# Patient Record
Sex: MALE | Race: WHITE | NOT HISPANIC OR LATINO | Employment: OTHER | URBAN - METROPOLITAN AREA
[De-identification: names, ages, dates, MRNs, and addresses within clinical notes are randomized per-mention and may not be internally consistent; named-entity substitution may affect disease eponyms.]

---

## 2018-01-12 NOTE — MISCELLANEOUS
Message  GI Reminder Recall [facility]:   Date: 04/28/2016   Dear Claudia Sotelo:     Review of our records shows you are due for the following: Colonoscopy  Please call the following office to schedule your appointment:   [address], [address] [place], [place], [place] [address] [phone]  We look forward to hearing from you! Sincerely,         Signatures   Electronically signed by :  Roberta Rossi, ; Apr 28 2016  2:49PM EST                       (Author)

## 2019-10-09 RX ORDER — AMLODIPINE BESYLATE 10 MG/1
TABLET ORAL
COMMUNITY
Start: 2014-03-28 | End: 2020-05-04 | Stop reason: SDUPTHER

## 2019-10-09 RX ORDER — CYCLOBENZAPRINE HCL 10 MG
TABLET ORAL
COMMUNITY
Start: 2014-08-06 | End: 2022-01-18

## 2019-10-09 RX ORDER — GABAPENTIN 100 MG/1
CAPSULE ORAL
COMMUNITY
Start: 2014-09-12 | End: 2022-01-18

## 2019-10-09 RX ORDER — FENTANYL 100 UG/H
PATCH TRANSDERMAL
COMMUNITY
Start: 2014-12-15 | End: 2022-01-18

## 2019-10-09 RX ORDER — ONDANSETRON 4 MG/1
TABLET, FILM COATED ORAL
COMMUNITY
Start: 2014-09-12 | End: 2022-01-18

## 2019-10-09 RX ORDER — PREGABALIN 150 MG/1
CAPSULE ORAL
COMMUNITY
Start: 2014-09-29 | End: 2022-01-18

## 2019-10-09 RX ORDER — MELOXICAM 7.5 MG/1
1 TABLET ORAL DAILY
COMMUNITY
Start: 2014-10-24 | End: 2022-01-18

## 2019-10-09 RX ORDER — NEOMYCIN SULFATE, POLYMYXIN B SULFATE AND HYDROCORTISONE 10; 3.5; 1 MG/ML; MG/ML; [USP'U]/ML
SUSPENSION/ DROPS AURICULAR (OTIC)
COMMUNITY
Start: 2014-08-22 | End: 2022-01-18

## 2019-10-09 RX ORDER — ALPRAZOLAM 0.5 MG/1
TABLET ORAL
COMMUNITY
Start: 2014-12-15 | End: 2022-01-18

## 2019-10-09 RX ORDER — GLIMEPIRIDE 4 MG/1
4 TABLET ORAL
COMMUNITY
Start: 2010-11-09 | End: 2022-01-18

## 2019-10-09 RX ORDER — FENOFIBRATE 134 MG/1
CAPSULE ORAL
COMMUNITY
Start: 2014-05-12 | End: 2022-01-18

## 2019-10-09 RX ORDER — OXYCODONE HYDROCHLORIDE AND ACETAMINOPHEN 5; 325 MG/1; MG/1
TABLET ORAL
COMMUNITY
Start: 2014-12-17 | End: 2022-01-18

## 2019-10-09 RX ORDER — GABAPENTIN 300 MG/1
CAPSULE ORAL
COMMUNITY
Start: 2014-09-12 | End: 2022-01-18

## 2019-10-09 RX ORDER — FENOFIBRATE 145 MG/1
145 TABLET, COATED ORAL
COMMUNITY
Start: 2010-11-09 | End: 2022-01-18

## 2019-10-14 ENCOUNTER — OFFICE VISIT (OUTPATIENT)
Dept: FAMILY MEDICINE CLINIC | Facility: CLINIC | Age: 59
End: 2019-10-14
Payer: COMMERCIAL

## 2019-10-14 DIAGNOSIS — E11.9 TYPE 2 DIABETES MELLITUS WITHOUT COMPLICATION, WITHOUT LONG-TERM CURRENT USE OF INSULIN (HCC): ICD-10-CM

## 2019-10-14 DIAGNOSIS — B37.0 ORAL THRUSH: ICD-10-CM

## 2019-10-14 DIAGNOSIS — I10 HYPERTENSION, UNSPECIFIED TYPE: ICD-10-CM

## 2019-10-14 DIAGNOSIS — Z12.11 COLON CANCER SCREENING: ICD-10-CM

## 2019-10-14 DIAGNOSIS — Z11.59 NEED FOR HEPATITIS C SCREENING TEST: ICD-10-CM

## 2019-10-14 DIAGNOSIS — Z12.5 PROSTATE CANCER SCREENING: ICD-10-CM

## 2019-10-14 DIAGNOSIS — E78.5 HYPERLIPIDEMIA, UNSPECIFIED HYPERLIPIDEMIA TYPE: ICD-10-CM

## 2019-10-14 DIAGNOSIS — Z00.00 HEALTHCARE MAINTENANCE: Primary | ICD-10-CM

## 2019-10-14 DIAGNOSIS — N40.0 BENIGN PROSTATIC HYPERPLASIA, UNSPECIFIED WHETHER LOWER URINARY TRACT SYMPTOMS PRESENT: ICD-10-CM

## 2019-10-14 PROCEDURE — 99386 PREV VISIT NEW AGE 40-64: CPT | Performed by: FAMILY MEDICINE

## 2019-10-14 RX ORDER — TAMSULOSIN HYDROCHLORIDE 0.4 MG/1
0.4 CAPSULE ORAL
Qty: 30 CAPSULE | Refills: 3 | Status: SHIPPED | OUTPATIENT
Start: 2019-10-14 | End: 2022-01-18

## 2019-10-14 RX ORDER — METFORMIN HYDROCHLORIDE 500 MG/1
1000 TABLET, EXTENDED RELEASE ORAL
Qty: 60 TABLET | Refills: 3 | Status: SHIPPED | OUTPATIENT
Start: 2019-10-14 | End: 2020-06-06
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
## 2019-10-17 VITALS
RESPIRATION RATE: 16 BRPM | BODY MASS INDEX: 27.08 KG/M2 | TEMPERATURE: 98.1 F | OXYGEN SATURATION: 98 % | SYSTOLIC BLOOD PRESSURE: 126 MMHG | HEIGHT: 74 IN | DIASTOLIC BLOOD PRESSURE: 86 MMHG | WEIGHT: 211 LBS | HEART RATE: 102 BPM

## 2019-10-17 PROBLEM — Z00.00 HEALTHCARE MAINTENANCE: Status: ACTIVE | Noted: 2019-10-14

## 2019-10-17 RX ORDER — CLOTRIMAZOLE 10 MG/1
10 LOZENGE ORAL; TOPICAL
Qty: 70 TABLET | Refills: 0 | Status: SHIPPED | OUTPATIENT
Start: 2019-10-17 | End: 2019-10-31

## 2019-10-17 NOTE — PATIENT INSTRUCTIONS

## 2019-10-17 NOTE — ASSESSMENT & PLAN NOTE
It was discussed about immunizations, diet, exercise and safety measures  Was referred to get colonoscopy

## 2019-10-17 NOTE — ASSESSMENT & PLAN NOTE
It was discussed with patient T use Lantus 20 units q h s  He is to monitor his sugar fasting in the morning and 2 hours after supper    No results found for: HGBA1C

## 2019-10-17 NOTE — ASSESSMENT & PLAN NOTE
It was discussed about low-fat diet  I will check fasting lipid profile and consider starting on statin

## 2019-10-17 NOTE — ASSESSMENT & PLAN NOTE
Fair wendy  Will continue to monitor at home  I will consider starting him on low-dose of lisinopril

## 2019-10-18 ENCOUNTER — TELEPHONE (OUTPATIENT)
Dept: FAMILY MEDICINE CLINIC | Facility: CLINIC | Age: 59
End: 2019-10-18

## 2019-10-18 DIAGNOSIS — Z12.11 COLON CANCER SCREENING: Primary | ICD-10-CM

## 2019-10-18 NOTE — TELEPHONE ENCOUNTER
Call f rom wife to tell us that no one called them to schedule 's colonoscopy    Also he was supposed to have lozenges for thrush and these were not ordered

## 2019-11-13 ENCOUNTER — TELEPHONE (OUTPATIENT)
Dept: FAMILY MEDICINE CLINIC | Facility: CLINIC | Age: 59
End: 2019-11-13

## 2020-05-04 ENCOUNTER — OFFICE VISIT (OUTPATIENT)
Dept: FAMILY MEDICINE CLINIC | Facility: CLINIC | Age: 60
End: 2020-05-04
Payer: COMMERCIAL

## 2020-05-04 VITALS
BODY MASS INDEX: 26.82 KG/M2 | SYSTOLIC BLOOD PRESSURE: 160 MMHG | OXYGEN SATURATION: 98 % | HEIGHT: 74 IN | HEART RATE: 100 BPM | RESPIRATION RATE: 16 BRPM | DIASTOLIC BLOOD PRESSURE: 80 MMHG | TEMPERATURE: 97.8 F | WEIGHT: 209 LBS

## 2020-05-04 DIAGNOSIS — I10 HYPERTENSION, UNSPECIFIED TYPE: Primary | ICD-10-CM

## 2020-05-04 DIAGNOSIS — N40.0 BENIGN PROSTATIC HYPERPLASIA, UNSPECIFIED WHETHER LOWER URINARY TRACT SYMPTOMS PRESENT: ICD-10-CM

## 2020-05-04 DIAGNOSIS — M54.9 CHRONIC BILATERAL BACK PAIN, UNSPECIFIED BACK LOCATION: ICD-10-CM

## 2020-05-04 DIAGNOSIS — G89.29 CHRONIC BILATERAL BACK PAIN, UNSPECIFIED BACK LOCATION: ICD-10-CM

## 2020-05-04 DIAGNOSIS — B37.0 ORAL THRUSH: ICD-10-CM

## 2020-05-04 DIAGNOSIS — E78.49 OTHER HYPERLIPIDEMIA: ICD-10-CM

## 2020-05-04 DIAGNOSIS — E11.9 TYPE 2 DIABETES MELLITUS WITHOUT COMPLICATION, WITHOUT LONG-TERM CURRENT USE OF INSULIN (HCC): ICD-10-CM

## 2020-05-04 PROBLEM — M51.369 LUMBAR DEGENERATIVE DISC DISEASE: Status: ACTIVE | Noted: 2017-02-24

## 2020-05-04 PROBLEM — M51.36 LUMBAR DEGENERATIVE DISC DISEASE: Status: ACTIVE | Noted: 2017-02-24

## 2020-05-04 PROCEDURE — 4010F ACE/ARB THERAPY RXD/TAKEN: CPT | Performed by: FAMILY MEDICINE

## 2020-05-04 PROCEDURE — 3077F SYST BP >= 140 MM HG: CPT | Performed by: FAMILY MEDICINE

## 2020-05-04 PROCEDURE — 3008F BODY MASS INDEX DOCD: CPT | Performed by: FAMILY MEDICINE

## 2020-05-04 PROCEDURE — 99214 OFFICE O/P EST MOD 30 MIN: CPT | Performed by: FAMILY MEDICINE

## 2020-05-04 PROCEDURE — 3079F DIAST BP 80-89 MM HG: CPT | Performed by: FAMILY MEDICINE

## 2020-05-04 RX ORDER — AMLODIPINE BESYLATE 10 MG/1
10 TABLET ORAL DAILY
Qty: 90 TABLET | Refills: 1 | Status: SHIPPED | OUTPATIENT
Start: 2020-05-04 | End: 2020-12-11

## 2020-05-04 RX ORDER — CLOTRIMAZOLE 10 MG/1
10 LOZENGE ORAL; TOPICAL
Qty: 70 TABLET | Refills: 0 | Status: SHIPPED | OUTPATIENT
Start: 2020-05-04 | End: 2020-05-18

## 2020-05-05 DIAGNOSIS — E11.9 TYPE 2 DIABETES MELLITUS WITHOUT COMPLICATION, WITHOUT LONG-TERM CURRENT USE OF INSULIN (HCC): Primary | ICD-10-CM

## 2020-06-04 DIAGNOSIS — E11.9 TYPE 2 DIABETES MELLITUS WITHOUT COMPLICATION, WITHOUT LONG-TERM CURRENT USE OF INSULIN (HCC): ICD-10-CM

## 2020-06-06 RX ORDER — METFORMIN HYDROCHLORIDE 500 MG/1
1000 TABLET, EXTENDED RELEASE ORAL
Qty: 60 TABLET | Refills: 2 | Status: SHIPPED | OUTPATIENT
Start: 2020-06-06 | End: 2022-01-18

## 2020-07-14 DIAGNOSIS — E11.9 TYPE 2 DIABETES MELLITUS WITHOUT COMPLICATION, WITHOUT LONG-TERM CURRENT USE OF INSULIN (HCC): ICD-10-CM

## 2020-07-15 RX ORDER — INSULIN GLARGINE 100 [IU]/ML
INJECTION, SOLUTION SUBCUTANEOUS
Qty: 15 ML | Refills: 0 | Status: SHIPPED | OUTPATIENT
Start: 2020-07-15 | End: 2020-10-09

## 2020-07-16 NOTE — TELEPHONE ENCOUNTER
Pt aware that he needs office visit and will call back next week to be scheduled  He has labs in chart that were ordered on 10/14/19 and will have them done

## 2020-08-24 ENCOUNTER — TELEPHONE (OUTPATIENT)
Dept: FAMILY MEDICINE CLINIC | Facility: CLINIC | Age: 60
End: 2020-08-24

## 2020-08-24 NOTE — TELEPHONE ENCOUNTER
Called pt   He said he has been so busy taking care of his in-laws who are in their 80's, cares for his father and his daughter just had twins a couple days ago    He said he will get his labs and set up an appt

## 2020-08-24 NOTE — TELEPHONE ENCOUNTER
Per Dr Brenda Donis  Please call pt and schedule routine follow up appt     Patient last seen 5/4/20  Patient was suppose to come back in [place]

## 2020-09-29 ENCOUNTER — TELEPHONE (OUTPATIENT)
Dept: FAMILY MEDICINE CLINIC | Facility: CLINIC | Age: 60
End: 2020-09-29

## 2020-09-29 NOTE — TELEPHONE ENCOUNTER
L/m for pt to call office  Fasting lab order placed 10 /2019  Calling  To see if pt can get labs done , then nhan f/u appt

## 2020-10-09 DIAGNOSIS — E11.9 TYPE 2 DIABETES MELLITUS WITHOUT COMPLICATION, WITHOUT LONG-TERM CURRENT USE OF INSULIN (HCC): ICD-10-CM

## 2020-10-09 RX ORDER — INSULIN GLARGINE 100 [IU]/ML
INJECTION, SOLUTION SUBCUTANEOUS
Qty: 15 ML | Refills: 0 | Status: SHIPPED | OUTPATIENT
Start: 2020-10-09 | End: 2020-12-11

## 2020-10-22 ENCOUNTER — TELEPHONE (OUTPATIENT)
Dept: FAMILY MEDICINE CLINIC | Facility: CLINIC | Age: 60
End: 2020-10-22

## 2020-12-11 DIAGNOSIS — I10 HYPERTENSION, UNSPECIFIED TYPE: ICD-10-CM

## 2020-12-11 DIAGNOSIS — E11.9 TYPE 2 DIABETES MELLITUS WITHOUT COMPLICATION, WITHOUT LONG-TERM CURRENT USE OF INSULIN (HCC): ICD-10-CM

## 2020-12-11 RX ORDER — INSULIN GLARGINE 100 [IU]/ML
INJECTION, SOLUTION SUBCUTANEOUS
Qty: 15 ML | Refills: 0 | Status: SHIPPED | OUTPATIENT
Start: 2020-12-11 | End: 2021-04-07

## 2020-12-11 RX ORDER — AMLODIPINE BESYLATE 10 MG/1
10 TABLET ORAL DAILY
Qty: 90 TABLET | Refills: 0 | Status: SHIPPED | OUTPATIENT
Start: 2020-12-11 | End: 2022-01-18

## 2021-02-12 DIAGNOSIS — E11.9 TYPE 2 DIABETES MELLITUS WITHOUT COMPLICATION, WITHOUT LONG-TERM CURRENT USE OF INSULIN (HCC): ICD-10-CM

## 2021-02-12 RX ORDER — INSULIN GLARGINE 100 [IU]/ML
INJECTION, SOLUTION SUBCUTANEOUS
Qty: 15 ML | Refills: 0 | OUTPATIENT
Start: 2021-02-12

## 2021-02-26 DIAGNOSIS — E11.9 TYPE 2 DIABETES MELLITUS WITHOUT COMPLICATION, WITHOUT LONG-TERM CURRENT USE OF INSULIN (HCC): ICD-10-CM

## 2021-02-28 RX ORDER — INSULIN GLARGINE 100 [IU]/ML
INJECTION, SOLUTION SUBCUTANEOUS
Qty: 15 ML | Refills: 0 | OUTPATIENT
Start: 2021-02-28

## 2021-03-29 DIAGNOSIS — E11.9 TYPE 2 DIABETES MELLITUS WITHOUT COMPLICATION, WITHOUT LONG-TERM CURRENT USE OF INSULIN (HCC): ICD-10-CM

## 2021-03-29 DIAGNOSIS — I10 HYPERTENSION, UNSPECIFIED TYPE: ICD-10-CM

## 2021-03-29 RX ORDER — METFORMIN HYDROCHLORIDE 500 MG/1
1000 TABLET, EXTENDED RELEASE ORAL
Qty: 60 TABLET | Refills: 1 | OUTPATIENT
Start: 2021-03-29

## 2021-03-29 RX ORDER — AMLODIPINE BESYLATE 10 MG/1
10 TABLET ORAL DAILY
Qty: 90 TABLET | Refills: 0 | OUTPATIENT
Start: 2021-03-29

## 2021-04-07 DIAGNOSIS — E11.9 TYPE 2 DIABETES MELLITUS WITHOUT COMPLICATION, WITHOUT LONG-TERM CURRENT USE OF INSULIN (HCC): ICD-10-CM

## 2021-04-07 RX ORDER — INSULIN GLARGINE 100 [IU]/ML
INJECTION, SOLUTION SUBCUTANEOUS
Qty: 15 ML | Refills: 0 | Status: SHIPPED | OUTPATIENT
Start: 2021-04-07 | End: 2021-08-27

## 2021-08-27 DIAGNOSIS — E11.9 TYPE 2 DIABETES MELLITUS WITHOUT COMPLICATION, WITHOUT LONG-TERM CURRENT USE OF INSULIN (HCC): ICD-10-CM

## 2021-08-27 RX ORDER — INSULIN GLARGINE 100 [IU]/ML
INJECTION, SOLUTION SUBCUTANEOUS
Qty: 15 ML | Refills: 0 | Status: SHIPPED | OUTPATIENT
Start: 2021-08-27 | End: 2022-01-18

## 2021-08-27 NOTE — TELEPHONE ENCOUNTER
Pt last seen 5/4/20, missed June appt and no appt sched  Called pt's home number unable to l/m , l/m on cell to call office press opt for sched  Pt needs to sched appt   1 mo refill given

## 2022-01-18 ENCOUNTER — OFFICE VISIT (OUTPATIENT)
Dept: INTERNAL MEDICINE CLINIC | Facility: OTHER | Age: 62
End: 2022-01-18
Payer: COMMERCIAL

## 2022-01-18 VITALS
OXYGEN SATURATION: 98 % | TEMPERATURE: 98 F | BODY MASS INDEX: 23.49 KG/M2 | HEIGHT: 74 IN | HEART RATE: 109 BPM | WEIGHT: 183 LBS | DIASTOLIC BLOOD PRESSURE: 100 MMHG | SYSTOLIC BLOOD PRESSURE: 160 MMHG

## 2022-01-18 DIAGNOSIS — L40.9 PSORIASIS: ICD-10-CM

## 2022-01-18 DIAGNOSIS — I10 HYPERTENSION, UNSPECIFIED TYPE: ICD-10-CM

## 2022-01-18 DIAGNOSIS — Z79.4 TYPE 2 DIABETES MELLITUS WITHOUT COMPLICATION, WITH LONG-TERM CURRENT USE OF INSULIN (HCC): Primary | ICD-10-CM

## 2022-01-18 DIAGNOSIS — N40.0 BENIGN PROSTATIC HYPERPLASIA, UNSPECIFIED WHETHER LOWER URINARY TRACT SYMPTOMS PRESENT: ICD-10-CM

## 2022-01-18 DIAGNOSIS — E78.5 HYPERLIPIDEMIA, UNSPECIFIED HYPERLIPIDEMIA TYPE: ICD-10-CM

## 2022-01-18 DIAGNOSIS — M51.36 LUMBAR DEGENERATIVE DISC DISEASE: ICD-10-CM

## 2022-01-18 DIAGNOSIS — Z12.11 ENCOUNTER FOR SCREENING FOR MALIGNANT NEOPLASM OF COLON: ICD-10-CM

## 2022-01-18 DIAGNOSIS — E11.9 TYPE 2 DIABETES MELLITUS WITHOUT COMPLICATION, WITH LONG-TERM CURRENT USE OF INSULIN (HCC): Primary | ICD-10-CM

## 2022-01-18 DIAGNOSIS — Z72.0 TOBACCO ABUSE: ICD-10-CM

## 2022-01-18 DIAGNOSIS — Z00.00 ANNUAL PHYSICAL EXAM: ICD-10-CM

## 2022-01-18 PROBLEM — Z11.59 NEED FOR HEPATITIS C SCREENING TEST: Status: RESOLVED | Noted: 2019-10-14 | Resolved: 2022-01-18

## 2022-01-18 PROBLEM — B37.0 ORAL THRUSH: Status: RESOLVED | Noted: 2019-10-14 | Resolved: 2022-01-18

## 2022-01-18 LAB — SL AMB POCT GLUCOSE BLD: 450

## 2022-01-18 PROCEDURE — 82948 REAGENT STRIP/BLOOD GLUCOSE: CPT | Performed by: INTERNAL MEDICINE

## 2022-01-18 PROCEDURE — 99214 OFFICE O/P EST MOD 30 MIN: CPT | Performed by: INTERNAL MEDICINE

## 2022-01-18 RX ORDER — INSULIN GLARGINE 300 U/ML
20 INJECTION, SOLUTION SUBCUTANEOUS
Qty: 6 ML | Refills: 1 | Status: SHIPPED | OUTPATIENT
Start: 2022-01-18

## 2022-01-18 RX ORDER — TAMSULOSIN HYDROCHLORIDE 0.4 MG/1
0.4 CAPSULE ORAL
Qty: 90 CAPSULE | Refills: 1 | Status: SHIPPED | OUTPATIENT
Start: 2022-01-18

## 2022-01-18 RX ORDER — AMLODIPINE BESYLATE 5 MG/1
5 TABLET ORAL DAILY
Qty: 90 TABLET | Refills: 1 | Status: SHIPPED | OUTPATIENT
Start: 2022-01-18 | End: 2022-01-18

## 2022-01-18 RX ORDER — GLIPIZIDE AND METFORMIN HCL 2.5; 5 MG/1; MG/1
1 TABLET, FILM COATED ORAL
Qty: 60 TABLET | Refills: 3 | Status: SHIPPED | OUTPATIENT
Start: 2022-01-18 | End: 2022-01-18

## 2022-01-18 RX ORDER — TRIAMCINOLONE ACETONIDE 1 MG/G
CREAM TOPICAL 2 TIMES DAILY
Qty: 30 G | Refills: 0 | Status: SHIPPED | OUTPATIENT
Start: 2022-01-18

## 2022-01-18 RX ORDER — LISINOPRIL 20 MG/1
20 TABLET ORAL DAILY
Qty: 90 TABLET | Refills: 1 | Status: SHIPPED | OUTPATIENT
Start: 2022-01-18 | End: 2022-01-18

## 2022-01-18 NOTE — PROGRESS NOTES
Clinic Visit Note  Malissa Pond 57 y o  male   MRN: [id number]    Assessment and Plan      Diagnoses and all orders for this visit:    Type 2 diabetes mellitus without complication, with long-term current use of insulin ([facility] [address] )  Initial labs, diabetic education future, lifestyle modifications discussed, patient has been on insulin in the past, will await initial blood work  Glucose today in office for 50  Start patient on Toujeo 20 units afternoon/bedtime once daily  Patient knows how to take insulin, was given 1st dose today in office   -     Ambulatory Referral to Ophthalmology; Future  -     Hemoglobin A1C; Future  -     Comprehensive metabolic panel; Future  -     Microalbumin / creatinine urine ratio    Encounter for screening for malignant neoplasm of colon  Patient due for colonoscopy screening, also has positive unintentional weight loss, fatigue, initial labs pending  -     Ambulatory Referral to Gastroenterology; Future    Hypertension, unspecified type  Uncontrolled hypertension, will await initial labs  Patient may benefit from amlodipine-losartan combination antihypertensive medications    Hyperlipidemia, unspecified hyperlipidemia type  Most likely will need high-intensity statin therapy  -     Lipid panel; Future    Benign prostatic hyperplasia, unspecified whether lower urinary tract symptoms present  Old medication, restart as this does help with frequency symptoms  -     tamsulosin (FLOMAX) 0 4 mg; Take 1 capsule (0 4 mg total) by mouth daily with dinner    Annual physical exam  -     TSH, 3rd generation with Free T4 reflex; Future  -     CBC and differential; Future  -     Hepatitis C antibody;  Future  -     HIV 1/2 ANTIGEN/ANTIBODY (4TH GENERATION) W REFLEX [facility]; Future    Lumbar degenerative disc disease  S/p L4-5 spinal surgery, no residual deficits or pain    Tobacco abuse  Pre contemplation stage, discussed tobacco abuse counseling    Psoriasis  Trial triamcinolone cream, patient needs to have blood sugars more controlled for better healing purposes    My impressions and treatment recommendations were discussed in detail with the patient who verbalized understanding and had no further questions  Discharge instructions were provided  Diabetic foot exam next visit  Influenza vaccine future  Pneumococcal vaccine future    Subjective     Chief Complaint: New Patient    History of Present Illness:    Patient is a very pleasant 78-year-old male presenting as a new patient to office  Has not followed up with a physician and many years  Notable history for lower back pain s/p L4-L5 surgery with no residual back pain, uncontrolled type 2 diabetes mellitus on/off insulin therapy, hypertension currently not on medication, hyperlipidemia currently not on medication  We discussed lifestyle modifications including diet and exercise going forward  We would like to get some initial labs to further evaluate everything that is going on medically with patient  He also notes that he has had some unintentional weight loss recently  We will send patient for colonoscopy and follow-up labs  Patient is now retired, has not been taking care of himself per patient, now wants to see what he can do to become more healthy for wife and kids  The following portions of the patient's history were reviewed and updated as appropriate: allergies, current medications, past family history, past medical history, past social history, past surgical history and problem list     REVIEW OF SYSTEMS:  A complete 12-point review of systems is negative other than that noted in the HPI  Review of Systems   Constitutional: Positive for fatigue and unexpected weight change  Negative for diaphoresis and fever  HENT: Negative for congestion and sore throat  Respiratory: Negative for cough and shortness of breath  Cardiovascular: Negative for chest pain, palpitations and leg swelling     Gastrointestinal: Positive for constipation  Negative for abdominal distention, diarrhea, nausea and vomiting  Endocrine: Positive for polydipsia and polyuria  Genitourinary: Positive for frequency  Negative for difficulty urinating and flank pain  Musculoskeletal: Negative for back pain and neck pain  Skin: Negative for color change and pallor  Neurological: Negative for dizziness and light-headedness  Psychiatric/Behavioral: Negative for agitation, behavioral problems and confusion  No current outpatient medications on file    Past Medical History:   Diagnosis Date    Diabetes mellitus ([address] )     HLD (hyperlipidemia)     Hypertension      Past Surgical History:   Procedure Laterality Date    ANKLE SURGERY Right     BACK SURGERY      L4-L5    NECK SURGERY       Social History     Socioeconomic History    Marital status: /Civil Union     Spouse name: Not on file    Number of children: Not on file    Years of education: Not on file    Highest education level: Not on file   Occupational History    Not on file   Tobacco Use    Smoking status: Current Every Day Smoker     Packs/day: 1 00    Smokeless tobacco: Never Used   Vaping Use    Vaping Use: Never used   Substance and Sexual Activity    Alcohol use: Yes     Comment: rare    Drug use: Never    Sexual activity: Not Currently   Other Topics Concern    Not on file   Social History Narrative    Not on file     Social Determinants of Health     Financial Resource Strain: Not on file   Food Insecurity: Not on file   Transportation Needs: Not on file   Physical Activity: Not on file   Stress: Not on file   Social Connections: Not on file   Intimate Partner Violence: Not on file   Housing Stability: Not on file     Family History   Problem Relation Age of Onset    Diabetes Mother     Diabetes Father     Heart disease Maternal Grandfather     Heart disease Paternal Grandfather      Allergies   Allergen Reactions    Zolpidem Other (See Comments)     severe drowsiness       Objective     Vitals:    01/18/22 1300   BP: 160/100   BP Location: Left arm   Patient Position: Sitting   Cuff Size: Adult   Pulse: (!) 109   Temp: 98 °F (36 7 °C)   TempSrc: Temporal   SpO2: 98%   Weight: 83 kg (183 lb)   Height: 6' 2" (1 88 m)       Physical exam:     GENERAL: NAD, pleasant   HEENT:  NC/AT, PERRL, EOMI, no scleral icterus  CARDIAC:  RRR, +S1/S2, no S3/S4 heard, no m/g/r  PULMONARY:  CTA B/L, no wheezing/rales/rhonci, non-labored breathing  ABDOMEN:  Soft, NT/ND, no rebound/guarding/rigidity  Extremities:  No edema, cyanosis, or clubbing  NEUROLOGIC: Grossly intact, No focal deficits  SKIN:  Pyoderma gangrenosum vs   Psoriasis on all 4 extremities  Psych: Normal affect    ==  PLEASE NOTE:  This encounter was completed utilizing the [employer] Speech Voice Recognition Software  Grammatical errors, random word insertions, pronoun errors and incomplete sentences are occasional consequences of the system due to software limitations, ambient noise and hardware issues  These may be missed by proof reading prior to affixing electronic signature  Any questions or concerns about the content, text or information contained within the body of this dictation should be directly addressed to the physician for clarification  Please do not hesitate to call me directly if you have any any questions or concerns      DO Sabrina Khoury [address] Internal Medicine PGY-3

## 2022-01-19 ENCOUNTER — APPOINTMENT (OUTPATIENT)
Dept: LAB | Facility: IMAGING CENTER | Age: 62
End: 2022-01-19
Payer: COMMERCIAL

## 2022-01-19 DIAGNOSIS — Z79.4 TYPE 2 DIABETES MELLITUS WITHOUT COMPLICATION, WITH LONG-TERM CURRENT USE OF INSULIN (HCC): ICD-10-CM

## 2022-01-19 DIAGNOSIS — Z00.00 ANNUAL PHYSICAL EXAM: ICD-10-CM

## 2022-01-19 DIAGNOSIS — E11.9 TYPE 2 DIABETES MELLITUS WITHOUT COMPLICATION, WITH LONG-TERM CURRENT USE OF INSULIN (HCC): ICD-10-CM

## 2022-01-19 DIAGNOSIS — E78.5 HYPERLIPIDEMIA, UNSPECIFIED HYPERLIPIDEMIA TYPE: ICD-10-CM

## 2022-01-19 LAB
ALBUMIN SERPL BCP-MCNC: 3.6 G/DL (ref 3.5–5)
ALP SERPL-CCNC: 104 U/L (ref 46–116)
ALT SERPL W P-5'-P-CCNC: 14 U/L (ref 12–78)
ANION GAP SERPL CALCULATED.3IONS-SCNC: 7 MMOL/L (ref 4–13)
AST SERPL W P-5'-P-CCNC: <5 U/L (ref 5–45)
BASOPHILS # BLD AUTO: 0.07 THOUSANDS/ΜL (ref 0–0.1)
BASOPHILS NFR BLD AUTO: 1 % (ref 0–1)
BILIRUB SERPL-MCNC: 0.9 MG/DL (ref 0.2–1)
BUN SERPL-MCNC: 11 MG/DL (ref 5–25)
CALCIUM SERPL-MCNC: 9.4 MG/DL (ref 8.3–10.1)
CHLORIDE SERPL-SCNC: 103 MMOL/L (ref 100–108)
CHOLEST SERPL-MCNC: 226 MG/DL
CO2 SERPL-SCNC: 28 MMOL/L (ref 21–32)
CREAT SERPL-MCNC: 1.03 MG/DL (ref 0.6–1.3)
CREAT UR-MCNC: 156 MG/DL
EOSINOPHIL # BLD AUTO: 0.17 THOUSAND/ΜL (ref 0–0.61)
EOSINOPHIL NFR BLD AUTO: 2 % (ref 0–6)
ERYTHROCYTE [DISTWIDTH] IN BLOOD BY AUTOMATED COUNT: 13.3 % (ref 11.6–15.1)
EST. AVERAGE GLUCOSE BLD GHB EST-MCNC: 260 MG/DL
GFR SERPL CREATININE-BSD FRML MDRD: 77 ML/MIN/1.73SQ M
GLUCOSE P FAST SERPL-MCNC: 298 MG/DL (ref 65–99)
HBA1C MFR BLD: 10.7 %
HCT VFR BLD AUTO: 51.1 % (ref 36.5–49.3)
HCV AB SER QL: NORMAL
HDLC SERPL-MCNC: 47 MG/DL
HGB BLD-MCNC: 17.1 G/DL (ref 12–17)
IMM GRANULOCYTES # BLD AUTO: 0.03 THOUSAND/UL (ref 0–0.2)
IMM GRANULOCYTES NFR BLD AUTO: 0 % (ref 0–2)
LDLC SERPL CALC-MCNC: 149 MG/DL (ref 0–100)
LYMPHOCYTES # BLD AUTO: 2.06 THOUSANDS/ΜL (ref 0.6–4.47)
LYMPHOCYTES NFR BLD AUTO: 29 % (ref 14–44)
MCH RBC QN AUTO: 28.4 PG (ref 26.8–34.3)
MCHC RBC AUTO-ENTMCNC: 33.5 G/DL (ref 31.4–37.4)
MCV RBC AUTO: 85 FL (ref 82–98)
MICROALBUMIN UR-MCNC: 66.2 MG/L (ref 0–20)
MICROALBUMIN/CREAT 24H UR: 42 MG/G CREATININE (ref 0–30)
MONOCYTES # BLD AUTO: 0.48 THOUSAND/ΜL (ref 0.17–1.22)
MONOCYTES NFR BLD AUTO: 7 % (ref 4–12)
NEUTROPHILS # BLD AUTO: 4.25 THOUSANDS/ΜL (ref 1.85–7.62)
NEUTS SEG NFR BLD AUTO: 61 % (ref 43–75)
NONHDLC SERPL-MCNC: 179 MG/DL
NRBC BLD AUTO-RTO: 0 /100 WBCS
PLATELET # BLD AUTO: 281 THOUSANDS/UL (ref 149–390)
PMV BLD AUTO: 10.7 FL (ref 8.9–12.7)
POTASSIUM SERPL-SCNC: 4.3 MMOL/L (ref 3.5–5.3)
PROT SERPL-MCNC: 7.6 G/DL (ref 6.4–8.2)
RBC # BLD AUTO: 6.02 MILLION/UL (ref 3.88–5.62)
SODIUM SERPL-SCNC: 138 MMOL/L (ref 136–145)
TRIGL SERPL-MCNC: 152 MG/DL
TSH SERPL DL<=0.05 MIU/L-ACNC: 1.06 UIU/ML (ref 0.36–3.74)
WBC # BLD AUTO: 7.06 THOUSAND/UL (ref 4.31–10.16)

## 2022-01-19 PROCEDURE — 84443 ASSAY THYROID STIM HORMONE: CPT

## 2022-01-19 PROCEDURE — 83036 HEMOGLOBIN GLYCOSYLATED A1C: CPT

## 2022-01-19 PROCEDURE — 85025 COMPLETE CBC W/AUTO DIFF WBC: CPT

## 2022-01-19 PROCEDURE — 82043 UR ALBUMIN QUANTITATIVE: CPT | Performed by: STUDENT IN AN ORGANIZED HEALTH CARE EDUCATION/TRAINING PROGRAM

## 2022-01-19 PROCEDURE — 3046F HEMOGLOBIN A1C LEVEL >9.0%: CPT | Performed by: INTERNAL MEDICINE

## 2022-01-19 PROCEDURE — 80053 COMPREHEN METABOLIC PANEL: CPT

## 2022-01-19 PROCEDURE — 87389 HIV-1 AG W/HIV-1&-2 AB AG IA: CPT

## 2022-01-19 PROCEDURE — 86803 HEPATITIS C AB TEST: CPT

## 2022-01-19 PROCEDURE — 82570 ASSAY OF URINE CREATININE: CPT | Performed by: STUDENT IN AN ORGANIZED HEALTH CARE EDUCATION/TRAINING PROGRAM

## 2022-01-19 PROCEDURE — 80061 LIPID PANEL: CPT

## 2022-01-19 PROCEDURE — 3060F POS MICROALBUMINURIA REV: CPT | Performed by: INTERNAL MEDICINE

## 2022-01-19 PROCEDURE — 4010F ACE/ARB THERAPY RXD/TAKEN: CPT | Performed by: INTERNAL MEDICINE

## 2022-01-19 PROCEDURE — 36415 COLL VENOUS BLD VENIPUNCTURE: CPT

## 2022-01-19 RX ORDER — AMLODIPINE BESYLATE 5 MG/1
5 TABLET ORAL DAILY
Qty: 90 TABLET | Refills: 1 | Status: SHIPPED | OUTPATIENT
Start: 2022-01-19

## 2022-01-19 RX ORDER — ROSUVASTATIN CALCIUM 20 MG/1
20 TABLET, COATED ORAL DAILY
Qty: 90 TABLET | Refills: 3 | Status: SHIPPED | OUTPATIENT
Start: 2022-01-19

## 2022-01-19 RX ORDER — LOSARTAN POTASSIUM 25 MG/1
25 TABLET ORAL DAILY
Qty: 90 TABLET | Refills: 3 | Status: SHIPPED | OUTPATIENT
Start: 2022-01-19

## 2022-01-20 LAB — HIV 1+2 AB+HIV1 P24 AG SERPL QL IA: NORMAL

## 2022-01-26 ENCOUNTER — OFFICE VISIT (OUTPATIENT)
Dept: INTERNAL MEDICINE CLINIC | Facility: OTHER | Age: 62
End: 2022-01-26
Payer: COMMERCIAL

## 2022-01-26 VITALS
HEART RATE: 110 BPM | DIASTOLIC BLOOD PRESSURE: 74 MMHG | OXYGEN SATURATION: 98 % | RESPIRATION RATE: 20 BRPM | BODY MASS INDEX: 24.15 KG/M2 | HEIGHT: 74 IN | WEIGHT: 188.2 LBS | TEMPERATURE: 98.1 F | SYSTOLIC BLOOD PRESSURE: 118 MMHG

## 2022-01-26 DIAGNOSIS — E11.9 TYPE 2 DIABETES MELLITUS WITHOUT COMPLICATION, WITHOUT LONG-TERM CURRENT USE OF INSULIN (HCC): ICD-10-CM

## 2022-01-26 DIAGNOSIS — E78.5 HYPERLIPIDEMIA, UNSPECIFIED HYPERLIPIDEMIA TYPE: ICD-10-CM

## 2022-01-26 DIAGNOSIS — I10 ESSENTIAL HYPERTENSION: Primary | ICD-10-CM

## 2022-01-26 PROCEDURE — 3008F BODY MASS INDEX DOCD: CPT | Performed by: INTERNAL MEDICINE

## 2022-01-26 PROCEDURE — 3074F SYST BP LT 130 MM HG: CPT | Performed by: INTERNAL MEDICINE

## 2022-01-26 PROCEDURE — 99214 OFFICE O/P EST MOD 30 MIN: CPT | Performed by: INTERNAL MEDICINE

## 2022-01-26 PROCEDURE — 3078F DIAST BP <80 MM HG: CPT | Performed by: INTERNAL MEDICINE

## 2022-01-26 RX ORDER — GLIPIZIDE AND METFORMIN HCL 2.5; 5 MG/1; MG/1
TABLET, FILM COATED ORAL
COMMUNITY
Start: 2022-01-18 | End: 2022-03-31 | Stop reason: SDUPTHER

## 2022-01-26 RX ORDER — LISINOPRIL 20 MG/1
TABLET ORAL
COMMUNITY
Start: 2022-01-18 | End: 2022-01-26

## 2022-01-26 NOTE — PROGRESS NOTES
Assessment/Plan:     Diagnoses and all orders for this visit:    Essential hypertension    Hyperlipidemia, unspecified hyperlipidemia type    Type 2 diabetes mellitus without complication, without long-term current use of insulin ([facility])    Other orders  -     glipiZIDE-metFORMIN (METAGLIP) 2 5-500 MG per tablet  -     lisinopril (ZESTRIL) 20 mg tablet             M*Modal software was used to dictate this note  It may contain errors with dictating incorrect words or incorrect spelling  Please contact the provider directly with any questions  Subjective:   Chief Complaint   Patient presents with    Follow-up     1 week, labs done 1/19/22  feeling a lot better     health maintenance     annual exam, foot exam,        Patient ID: Rick Mark is a 57 y o  male  HPI  57 years young gentleman who was seen in the office last week for uncontrolled diabetes patient has a history of diabetes but was not taking any medication he was losing weight multiple skin lesions and he was tired numbness and tingling in his leg he did not have any fever or chills at that time no urinary or bowel symptoms  Since he was started on multiple medication he is feeling much better his blood sugars are much improved he did not have any hypoglycemic episodes  Blood sugars are in high 100 and low 200 as compared to 496 last time hemoglobin A1c is 10 1  Plan is to continue with the present medication  Hypertension was significantly out of control when he came in his blood pressure is much better  Also he is getting rosuvastatin  The following portions of the patient's history were reviewed and updated as appropriate: allergies, current medications, past family history, past medical history, past social history, past surgical history and problem list     Review of Systems   Constitutional: Negative for activity change, fatigue and fever  HENT: Negative for congestion, sinus pain and sore throat  Eyes: Negative for discharge  Respiratory: Negative for cough and shortness of breath  Cardiovascular: Negative for chest pain and palpitations  Gastrointestinal: Negative for constipation, diarrhea and nausea  Genitourinary: Negative for hematuria and urgency  Musculoskeletal: Negative for back pain and gait problem  Neurological: Negative for dizziness, weakness and light-headedness  Psychiatric/Behavioral: Positive for sleep disturbance  The patient is not nervous/anxious            Past Medical History:   Diagnosis Date    Diabetes mellitus ([facility] [address] )     HLD (hyperlipidemia)     Hypertension          Current Outpatient Medications:     amLODIPine (NORVASC) 5 mg tablet, Take 1 tablet (5 mg total) by mouth daily, Disp: 90 tablet, Rfl: 1    glipiZIDE-metFORMIN (METAGLIP) 2 5-500 MG per tablet, , Disp: , Rfl:     insulin glargine (Toujeo Max SoloStar) 300 units/mL CONCENTRATED U-300 injection pen (2-unit dial), Inject 20 Units under the skin daily at bedtime, Disp: 6 mL, Rfl: 1    lisinopril (ZESTRIL) 20 mg tablet, , Disp: , Rfl:     losartan (COZAAR) 25 mg tablet, Take 1 tablet (25 mg total) by mouth daily, Disp: 90 tablet, Rfl: 3    rosuvastatin (CRESTOR) 20 MG tablet, Take 1 tablet (20 mg total) by mouth daily, Disp: 90 tablet, Rfl: 3    tamsulosin (FLOMAX) 0 4 mg, Take 1 capsule (0 4 mg total) by mouth daily with dinner, Disp: 90 capsule, Rfl: 1    triamcinolone (KENALOG) 0 1 % cream, Apply topically 2 (two) times a day, Disp: 30 g, Rfl: 0    Allergies   Allergen Reactions    Zolpidem Other (See Comments)     severe drowsiness       Social History   Past Surgical History:   Procedure Laterality Date    ANKLE SURGERY Right     BACK SURGERY      L4-L5    NECK SURGERY       Family History   Problem Relation Age of Onset    Diabetes Mother     Diabetes Father     Heart disease Maternal Grandfather     Heart disease Paternal Grandfather        Objective:  /74 (BP Location: Left arm, Patient Position: Sitting, Cuff Size: Large)   Pulse (!) 110   Temp 98 1 °F (36 7 °C) (Temporal)   Resp 20   Ht 6' 2" (1 88 m)   Wt 85 4 kg (188 lb 3 2 oz)   SpO2 98%   BMI 24 16 kg/m²        Physical Exam  Constitutional:       Appearance: He is well-developed  Cardiovascular:      Rate and Rhythm: Normal rate and regular rhythm  Pulses: Pulses are weak  Dorsalis pedis pulses are 1+ on the right side and 1+ on the left side  Posterior tibial pulses are 1+ on the right side and 1+ on the left side  Heart sounds: Normal heart sounds  Pulmonary:      Effort: Pulmonary effort is normal       Breath sounds: Normal breath sounds  Feet:      Right foot:      Skin integrity: Callus and dry skin present  Left foot:      Skin integrity: Callus and dry skin present  Skin:     General: Skin is warm and dry  Diabetic Foot Exam    Patient's shoes and socks removed  Right Foot/Ankle   Right Foot Inspection  Skin Exam: skin intact, dry skin, callus and callus  Sensory   Vibration: diminished  Proprioception: diminished  Monofilament testing: diminished    Vascular  Capillary refills: < 3 seconds  The right DP pulse is 1+  The right PT pulse is 1+  Left Foot/Ankle  Left Foot Inspection  Skin Exam: skin intact, dry skin and callus  Sensory   Vibration: diminished  Proprioception: diminished  Monofilament testing: diminished    Vascular  Capillary refills: < 3 seconds  The left DP pulse is 1+  The left PT pulse is 1+       Assign Risk Category  No deformity present  No loss of protective sensation  Weak pulses  Risk: 1

## 2022-03-30 RX ORDER — GLIPIZIDE AND METFORMIN HCL 2.5; 5 MG/1; MG/1
TABLET, FILM COATED ORAL
Status: CANCELLED | OUTPATIENT
Start: 2022-03-30

## 2022-03-31 NOTE — TELEPHONE ENCOUNTER
I have reordered glipizide-metformin, I remember talking to this patient and we gave him free samples  Medications sent to pharmacy

## 2022-03-31 NOTE — TELEPHONE ENCOUNTER
Pt is requesting Glipizide-Metformin refill but it is unclear if he is supposed to be taking it or not  Nothing noted in the office visit note  Please advise

## 2022-06-13 ENCOUNTER — VBI (OUTPATIENT)
Dept: ADMINISTRATIVE | Facility: OTHER | Age: 62
End: 2022-06-13

## 2023-09-12 ENCOUNTER — OFFICE VISIT (OUTPATIENT)
Dept: FAMILY MEDICINE CLINIC | Facility: CLINIC | Age: 63
End: 2023-09-12
Payer: COMMERCIAL

## 2023-09-12 ENCOUNTER — TELEPHONE (OUTPATIENT)
Dept: FAMILY MEDICINE CLINIC | Facility: CLINIC | Age: 63
End: 2023-09-12

## 2023-09-12 VITALS
OXYGEN SATURATION: 98 % | HEART RATE: 84 BPM | WEIGHT: 188 LBS | RESPIRATION RATE: 16 BRPM | TEMPERATURE: 96.1 F | BODY MASS INDEX: 24.13 KG/M2 | SYSTOLIC BLOOD PRESSURE: 126 MMHG | HEIGHT: 74 IN | DIASTOLIC BLOOD PRESSURE: 78 MMHG

## 2023-09-12 DIAGNOSIS — I63.89 CEREBROVASCULAR ACCIDENT (CVA) DUE TO OTHER MECHANISM (HCC): ICD-10-CM

## 2023-09-12 DIAGNOSIS — E11.65 TYPE 2 DIABETES MELLITUS WITH HYPERGLYCEMIA, WITH LONG-TERM CURRENT USE OF INSULIN (HCC): Primary | ICD-10-CM

## 2023-09-12 DIAGNOSIS — F41.9 ANXIETY: Primary | ICD-10-CM

## 2023-09-12 DIAGNOSIS — Z12.5 PROSTATE CANCER SCREENING: ICD-10-CM

## 2023-09-12 DIAGNOSIS — Z00.01 ABNORMAL WELLNESS EXAM: ICD-10-CM

## 2023-09-12 DIAGNOSIS — F41.9 ANXIETY: ICD-10-CM

## 2023-09-12 DIAGNOSIS — E78.49 OTHER HYPERLIPIDEMIA: ICD-10-CM

## 2023-09-12 DIAGNOSIS — R53.1 WEAKNESS OF LEFT SIDE OF BODY: ICD-10-CM

## 2023-09-12 DIAGNOSIS — Z79.4 TYPE 2 DIABETES MELLITUS WITH HYPERGLYCEMIA, WITH LONG-TERM CURRENT USE OF INSULIN (HCC): Primary | ICD-10-CM

## 2023-09-12 DIAGNOSIS — I10 ESSENTIAL HYPERTENSION: ICD-10-CM

## 2023-09-12 PROBLEM — I63.9 CEREBROVASCULAR ACCIDENT (HCC): Status: ACTIVE | Noted: 2023-09-12

## 2023-09-12 PROBLEM — R09.89 SUSPECTED CEREBROVASCULAR ACCIDENT (CVA): Status: ACTIVE | Noted: 2023-08-29

## 2023-09-12 PROCEDURE — 99204 OFFICE O/P NEW MOD 45 MIN: CPT | Performed by: FAMILY MEDICINE

## 2023-09-12 PROCEDURE — 99396 PREV VISIT EST AGE 40-64: CPT | Performed by: FAMILY MEDICINE

## 2023-09-12 RX ORDER — BLOOD PRESSURE TEST KIT
KIT MISCELLANEOUS
COMMUNITY
Start: 2023-09-05

## 2023-09-12 RX ORDER — ATORVASTATIN CALCIUM 40 MG/1
40 TABLET, FILM COATED ORAL DAILY
COMMUNITY
Start: 2023-09-05 | End: 2023-12-04

## 2023-09-12 RX ORDER — BLOOD-GLUCOSE METER
EACH MISCELLANEOUS
COMMUNITY
Start: 2023-09-05

## 2023-09-12 RX ORDER — ISOPROPYL ALCOHOL 70 ML/100ML
SWAB TOPICAL
COMMUNITY
Start: 2023-09-05

## 2023-09-12 RX ORDER — BLOOD SUGAR DIAGNOSTIC
STRIP MISCELLANEOUS
COMMUNITY
Start: 2023-09-05

## 2023-09-12 RX ORDER — CLOPIDOGREL BISULFATE 75 MG/1
TABLET ORAL
COMMUNITY
Start: 2023-09-05

## 2023-09-12 RX ORDER — ACETAMINOPHEN 500 MG
500 TABLET ORAL EVERY 4 HOURS PRN
COMMUNITY
Start: 2023-09-05

## 2023-09-12 RX ORDER — FENOFIBRATE 134 MG/1
CAPSULE ORAL
COMMUNITY
Start: 2023-09-05

## 2023-09-12 RX ORDER — NICOTINE 21 MG/24HR
PATCH, TRANSDERMAL 24 HOURS TRANSDERMAL
COMMUNITY
Start: 2023-09-05 | End: 2023-09-12

## 2023-09-12 RX ORDER — ALPRAZOLAM 0.5 MG/1
0.5 TABLET ORAL DAILY PRN
Qty: 30 TABLET | Refills: 0 | Status: SHIPPED | OUTPATIENT
Start: 2023-09-12

## 2023-09-12 RX ORDER — LISINOPRIL 5 MG/1
5 TABLET ORAL DAILY
COMMUNITY
Start: 2023-09-06 | End: 2023-12-05

## 2023-09-12 RX ORDER — OXYCODONE HYDROCHLORIDE 5 MG/1
TABLET ORAL
COMMUNITY
Start: 2023-09-05

## 2023-09-12 RX ORDER — ATORVASTATIN CALCIUM 40 MG/1
TABLET, FILM COATED ORAL
COMMUNITY
Start: 2023-09-05 | End: 2023-09-12

## 2023-09-12 RX ORDER — ASPIRIN 81 MG/1
81 TABLET ORAL DAILY
COMMUNITY
Start: 2023-09-05 | End: 2024-09-04

## 2023-09-12 RX ORDER — AMLODIPINE BESYLATE 10 MG/1
TABLET ORAL
COMMUNITY
Start: 2023-09-05

## 2023-09-12 RX ORDER — NICOTINE 21 MG/24HR
1 PATCH, TRANSDERMAL 24 HOURS TRANSDERMAL DAILY
COMMUNITY
Start: 2023-09-06 | End: 2023-10-10

## 2023-09-12 RX ORDER — NICOTINE 21 MG/24HR
1 PATCH, TRANSDERMAL 24 HOURS TRANSDERMAL DAILY
COMMUNITY
Start: 2023-10-10 | End: 2023-10-24

## 2023-09-12 NOTE — ASSESSMENT & PLAN NOTE
Patient with L sided weakness s/p stroke on 08/29. He was given referral to PT. He is also to continue with home exercises.

## 2023-09-12 NOTE — TELEPHONE ENCOUNTER
Patient left a message asking for us to order his xanax.  Said he feels as if he is having a panic attack

## 2023-09-12 NOTE — ASSESSMENT & PLAN NOTE
Patient is following with cardiology post acute ischemic stroke on 8/29. BP is 126/78 in office today. Continue with current medication routine. Continue to monitor.

## 2023-09-12 NOTE — ASSESSMENT & PLAN NOTE
Patient with multiple reported anxiety attacks since stroke. Prescribed Xanax as needed for anxiety attacks. Continue to monitor.

## 2023-09-12 NOTE — TELEPHONE ENCOUNTER
Patient seen this morning and you said you would send xanax to [facility] for him

## 2023-09-12 NOTE — PROGRESS NOTES
Name: Jay Jay Fuentes      :       MRN: [id number]  Encounter Provider: Alina Fishman MD  Encounter Date: 2023   Encounter department: [facility]     1. Type 2 diabetes mellitus with hyperglycemia, with long-term current use of insulin ([facility])  Assessment & Plan:  Patient with self-reporting morning glucose readings in the 130s. Patient is advised to gradually increase his dose of Lantus insulin. He is to increase by 1 unit every 2-3 days until achieving better glucose control. If morning sugar drops below 70, then patient should return to taking 27 units of Lantus daily. Follow up visit in 2 weeks. Patient is to bring his blood sugar readings to the visit. Lab Results   Component Value Date    HGBA1C 10.5 (H) 2023       Orders:  -     Albumin / creatinine urine ratio; Future; Expected date: 2023  -     Comprehensive metabolic panel; Future; Expected date: 2023  -     Hemoglobin A1C; Future; Expected date: 2023  -     CBC and differential; Future; Expected date: 2023  -     Lipid Panel with Direct LDL reflex; Future; Expected date: 2023  -     TSH, 3rd generation with Free T4 reflex; Future; Expected date: 2023    2. Essential hypertension  Assessment & Plan:  Patient is following with cardiology post acute ischemic stroke on . BP is 126/78 in office today. Continue with current medication routine. Continue to monitor. 3. Other hyperlipidemia  Assessment & Plan:  Lipid panel WNL on . Patient to continue with fenofibrate and Lipitor. Continue to monitor. 4. Weakness of left side of body  Assessment & Plan:  Patient with L sided weakness s/p stroke on . He was given referral to PT. He is also to continue with home exercises. Orders:  -     Ambulatory Referral to Physical Therapy; Future    5.  Cerebrovascular accident (CVA) due to other mechanism [facility])  Assessment & Plan:  Patient s/p R thalamocapsular infarction and R corpus callosum infarction on 08/29. He is to continue with ASA and Plavix. Patient given PT referral for residual L sided weakness. He is to follow up in 2 weeks. Patient also advised to follow up with cardiology and neurology. Orders:  -     Ambulatory Referral to Physical Therapy; Future    6. Anxiety  Assessment & Plan:  Patient with multiple reported anxiety attacks since stroke. Prescribed Xanax as needed for anxiety attacks. Continue to monitor. 7. Abnormal wellness exam  Assessment & Plan: It was discussed about immunizations,diet ,exercise and safety measures. 8. Prostate cancer screening  -     PSA, Total Screen; Future           Subjective     EM is a 60year old male with a PMH of HTN, HLD, DM2, tobacco use, and R thalamocapsular and R corpus callosum CVA on 8/29, who presents to the office for hospital follow up. Patient was discharged from the hospital on 09/05 on ASA and Plavix, which patient reports he is taking. The patient reports residual L arm and L leg weakness, pain, and numbness. Patient did PT/OT in the hospital and would like to continue outpatient PT. For diabetes, patient reports that he is taking Lantus 27 units at night and getting morning blood sugars in the 130s. Patient is not currently taking anything else for diabetes. Patient has noted that he has been experiencing several anxiety attacks since the stroke. While in the hospital, he was given Xanax for an attack and this provided him with relief. Since discharge 1 week ago, patient has had about 3 anxiety attacks. He describes them as sensations of "feeling unable to breathe" and feelings that he is going to die. Patient has seen Cardiology yesterday and was put on a 1 week cardiac monitor. Patient intends to schedule his appointment with neurology ASAP. Review of Systems   Constitutional: Positive for activity change. Negative for chills, fever and unexpected weight change. HENT: Negative for ear pain, hearing loss and rhinorrhea. Eyes: Negative for pain and visual disturbance. Respiratory: Negative for cough and shortness of breath. Cardiovascular: Negative for chest pain, palpitations and leg swelling. Gastrointestinal: Negative for abdominal pain, constipation, diarrhea, nausea and vomiting. Endocrine: Negative for polydipsia, polyphagia and polyuria. Genitourinary: Negative for difficulty urinating and dysuria. Musculoskeletal: Positive for myalgias. Negative for arthralgias. Neurological: Positive for weakness and numbness. Negative for dizziness and headaches. Psychiatric/Behavioral: Negative for suicidal ideas. The patient is nervous/anxious.         Past Medical History:   Diagnosis Date   • Diabetes mellitus ([address])    • HLD (hyperlipidemia)    • Hypertension      Past Surgical History:   Procedure Laterality Date   • ANKLE SURGERY Right    • BACK SURGERY      L4-L5   • NECK SURGERY       Family History   Problem Relation Age of Onset   • Diabetes Mother    • Diabetes Father    • Heart disease Maternal Grandfather    • Heart disease Paternal Grandfather      Social History     Socioeconomic History   • Marital status: /Civil Union     Spouse name: None   • Number of children: None   • Years of education: None   • Highest education level: None   Occupational History   • None   Tobacco Use   • Smoking status: Every Day     Packs/day: 1.00     Types: Cigarettes   • Smokeless tobacco: Never   Vaping Use   • Vaping Use: Never used   Substance and Sexual Activity   • Alcohol use: Yes     Comment: rare   • Drug use: Never   • Sexual activity: Not Currently   Other Topics Concern   • None   Social History Narrative   • None     Social Determinants of Health     Financial Resource Strain: Not on file   Food Insecurity: Not on file   Transportation Needs: Not on file   Physical Activity: Not on file   Stress: Not on file   Social Connections: Not on file Intimate Partner Violence: Not on file   Housing Stability: Not on file     Current Outpatient Medications on File Prior to Visit   Medication Sig   • Accu-Chek Guide test strip    • acetaminophen (TYLENOL) 500 mg tablet Take 500 mg by mouth every 4 (four) hours as needed   • Alcohol Swabs PADS As needed   • amLODIPine (NORVASC) 10 mg tablet    • aspirin (ECOTRIN LOW STRENGTH) 81 mg EC tablet Take 81 mg by mouth daily   • atorvastatin (LIPITOR) 40 mg tablet Take 40 mg by mouth daily   • Blood Glucose Monitoring Suppl (Accu-Chek Guide) w/Device KIT    • clopidogrel (PLAVIX) 75 mg tablet    • fenofibrate micronized (LOFIBRA) 134 MG capsule    • GNP Alcohol Swabs 70 % PADS    • insulin glargine (Toujeo Max SoloStar) 300 units/mL CONCENTRATED U-300 injection pen (2-unit dial) Inject 20 Units under the skin daily at bedtime   • lisinopril (ZESTRIL) 5 mg tablet Take 5 mg by mouth daily   • losartan (COZAAR) 25 mg tablet Take 1 tablet (25 mg total) by mouth daily   • [START ON 10/10/2023] nicotine (NICODERM CQ) 14 mg/24hr TD 24 hr patch Place 1 patch on the skin daily Do not start before October 10, 2023. • nicotine (NICODERM CQ) 21 mg/24 hr TD 24 hr patch Place 1 patch on the skin daily   • [START ON 10/24/2023] nicotine (NICODERM CQ) 7 mg/24hr TD 24 hr patch Place 1 patch on the skin daily Do not start before October 24, 2023.    • oxyCODONE (ROXICODONE) 5 immediate release tablet    • [DISCONTINUED] rosuvastatin (CRESTOR) 20 MG tablet Take 1 tablet (20 mg total) by mouth daily   • tamsulosin (FLOMAX) 0.4 mg Take 1 capsule (0.4 mg total) by mouth daily with dinner (Patient not taking: Reported on 9/12/2023)   • triamcinolone (KENALOG) 0.1 % cream Apply topically 2 (two) times a day (Patient not taking: Reported on 9/12/2023)   • [DISCONTINUED] amLODIPine (NORVASC) 5 mg tablet Take 1 tablet (5 mg total) by mouth daily (Patient not taking: Reported on 9/12/2023)   • [DISCONTINUED] atorvastatin (LIPITOR) 40 mg tablet (Patient not taking: Reported on 9/12/2023)   • [DISCONTINUED] glipiZIDE-metFORMIN (METAGLIP) 2.5-500 MG per tablet Take 1 tablet by mouth 2 (two) times a day before meals   • [DISCONTINUED] nicotine (NICODERM CQ) 21 mg/24 hr TD 24 hr patch  (Patient not taking: Reported on 9/12/2023)     Allergies   Allergen Reactions   • Zolpidem Other (See Comments)     severe drowsiness     Immunization History   Administered Date(s) Administered   • COVID-19 MODERNA VACC 0.5 ML IM 04/26/2021, 05/24/2021, 12/27/2021   • Tdap 12/01/2020       Objective     /78 (BP Location: Left arm, Patient Position: Sitting, Cuff Size: Large)   Pulse 84   Temp (!) 96.1 °F (35.6 °C) (Tympanic)   Resp 16   Ht 6' 2" (1.88 m)   Wt 85.3 kg (188 lb)   SpO2 98%   BMI 24.14 kg/m²     Physical Exam  Vitals and nursing note reviewed. Constitutional:       General: He is not in acute distress. Appearance: Normal appearance. He is not ill-appearing. Comments: Patient in wheelchair   HENT:      Head: Normocephalic and atraumatic. Right Ear: Tympanic membrane and external ear normal.      Left Ear: Tympanic membrane and external ear normal.      Nose: Nose normal.      Mouth/Throat:      Mouth: Mucous membranes are moist.      Pharynx: No oropharyngeal exudate or posterior oropharyngeal erythema. Eyes:      General:         Right eye: No discharge. Left eye: No discharge. Extraocular Movements: Extraocular movements intact. Conjunctiva/sclera: Conjunctivae normal.      Pupils: Pupils are equal, round, and reactive to light. Cardiovascular:      Rate and Rhythm: Normal rate and regular rhythm. Pulses: Normal pulses. Heart sounds: Normal heart sounds. No murmur heard. No friction rub. No gallop. Pulmonary:      Effort: Pulmonary effort is normal. No respiratory distress. Breath sounds: Normal breath sounds. Abdominal:      General: Abdomen is flat. There is no distension. Palpations: Abdomen is soft. There is no mass. Musculoskeletal:      Right lower leg: No edema. Left lower leg: No edema. Skin:     General: Skin is warm and dry. Findings: No erythema. Neurological:      Mental Status: He is alert and oriented to person, place, and time. Cranial Nerves: No cranial nerve deficit. Sensory: No sensory deficit. Motor: No weakness.       Comments: L proximal leg weakness   Psychiatric:         Mood and Affect: Mood normal.         Behavior: Behavior normal.

## 2023-09-12 NOTE — ASSESSMENT & PLAN NOTE
Patient s/p R thalamocapsular infarction and R corpus callosum infarction on 08/29. He is to continue with ASA and Plavix. Patient given PT referral for residual L sided weakness. He is to follow up in 2 weeks. Patient also advised to follow up with cardiology and neurology.

## 2023-09-12 NOTE — ASSESSMENT & PLAN NOTE
Patient with self-reporting morning glucose readings in the 130s. Patient is advised to gradually increase his dose of Lantus insulin. He is to increase by 1 unit every 2-3 days until achieving better glucose control. If morning sugar drops below 70, then patient should return to taking 27 units of Lantus daily. Follow up visit in 2 weeks. Patient is to bring his blood sugar readings to the visit.    Lab Results   Component Value Date    HGBA1C 10.5 (H) 08/29/2023

## 2023-09-12 NOTE — PROGRESS NOTES
Name: Janae Alcantara      :       MRN: [id number]  Encounter Provider: Guzman Humphreys MD  Encounter Date: 2023   Encounter department: [facility]     1. Type 2 diabetes mellitus with hyperglycemia, with long-term current use of insulin ([facility])  Assessment & Plan:  Patient with self-reporting morning glucose readings in the 130s. Patient is advised to gradually increase his dose of Lantus insulin. He is to increase by 1 unit every 2-3 days until achieving better glucose control. If morning sugar drops below 70, then patient should return to taking 27 units of Lantus daily. Follow up visit in 2 weeks. Patient is to bring his blood sugar readings to the visit. Lab Results   Component Value Date    HGBA1C 10.5 (H) 2023       Orders:  -     Albumin / creatinine urine ratio; Future; Expected date: 2023  -     Comprehensive metabolic panel; Future; Expected date: 2023  -     Hemoglobin A1C; Future; Expected date: 2023  -     CBC and differential; Future; Expected date: 2023  -     Lipid Panel with Direct LDL reflex; Future; Expected date: 2023  -     TSH, 3rd generation with Free T4 reflex; Future; Expected date: 2023    2. Essential hypertension  Assessment & Plan:  Patient is following with cardiology post acute ischemic stroke on . BP is 126/78 in office today. Continue with current medication routine. Continue to monitor. 3. Other hyperlipidemia  Assessment & Plan:  Lipid panel WNL on . Patient to continue with fenofibrate and Lipitor. Continue to monitor. 4. Weakness of left side of body  Assessment & Plan:  Patient with L sided weakness s/p stroke on . He was given referral to PT. He is also to continue with home exercises. Orders:  -     Ambulatory Referral to Physical Therapy; Future    5.  Cerebrovascular accident (CVA) due to other mechanism [facility])  Assessment & Plan:  Patient s/p R thalamocapsular infarction and R corpus callosum infarction on 08/29. He is to continue with ASA and Plavix. Patient given PT referral for residual L sided weakness. He is to follow up in 2 weeks. Patient also advised to follow up with cardiology and neurology. Orders:  -     Ambulatory Referral to Physical Therapy; Future    6. Anxiety  Assessment & Plan:  Patient with multiple reported anxiety attacks since stroke. Prescribed Xanax as needed for anxiety attacks. Continue to monitor. 7. Abnormal wellness exam  Assessment & Plan: It was discussed about immunizations,diet ,exercise and safety measures. 8. Prostate cancer screening  -     PSA, Total Screen; Future        Depression Screening and Follow-up Plan: Patient was screened for depression during today's encounter. They screened negative with a PHQ-2 score of 0. Subjective     EM is a 60year old male with a PMH of HTN, HLD, DM2, tobacco use, and R thalamocapsular and R corpus callosum CVA on 8/29, who presents to the office for hospital follow up. Patient was discharged from the hospital on 09/05 on ASA and Plavix, which patient reports he is taking. The patient reports residual L arm and L leg weakness, pain, and numbness. Patient did PT/OT in the hospital and would like to continue outpatient PT. For diabetes, patient reports that he is taking Lantus 27 units at night and getting morning blood sugars in the 130s. Patient is not currently taking anything else for diabetes. Patient has noted that he has been experiencing several anxiety attacks since the stroke. While in the hospital, he was given Xanax for an attack and this provided him with relief. Since discharge 1 week ago, patient has had about 3 anxiety attacks. He describes them as sensations of "feeling unable to breathe" and feelings that he is going to die. Patient has seen Cardiology yesterday and was put on a 1 week cardiac monitor.  Patient intends to schedule his appointment with neurology ASAP. Anxiety  Symptoms include nervous/anxious behavior. Patient reports no chest pain, dizziness, nausea, palpitations, shortness of breath or suicidal ideas. Review of Systems   Constitutional: Positive for activity change. Negative for chills, fever and unexpected weight change. HENT: Negative for ear pain, hearing loss and rhinorrhea. Eyes: Negative for pain and visual disturbance. Respiratory: Negative for cough and shortness of breath. Cardiovascular: Negative for chest pain, palpitations and leg swelling. Gastrointestinal: Negative for abdominal pain, constipation, diarrhea, nausea and vomiting. Endocrine: Negative for polydipsia, polyphagia and polyuria. Genitourinary: Negative for difficulty urinating and dysuria. Musculoskeletal: Positive for myalgias. Negative for arthralgias. Neurological: Positive for weakness and numbness. Negative for dizziness and headaches. Psychiatric/Behavioral: Negative for suicidal ideas. The patient is nervous/anxious.         Past Medical History:   Diagnosis Date   • Diabetes mellitus ([address])    • HLD (hyperlipidemia)    • Hypertension      Past Surgical History:   Procedure Laterality Date   • ANKLE SURGERY Right    • BACK SURGERY      L4-L5   • NECK SURGERY       Family History   Problem Relation Age of Onset   • Diabetes Mother    • Diabetes Father    • Heart disease Maternal Grandfather    • Heart disease Paternal Grandfather      Social History     Socioeconomic History   • Marital status: /Civil Union     Spouse name: None   • Number of children: None   • Years of education: None   • Highest education level: None   Occupational History   • None   Tobacco Use   • Smoking status: Every Day     Packs/day: 1.00     Types: Cigarettes   • Smokeless tobacco: Never   Vaping Use   • Vaping Use: Never used   Substance and Sexual Activity   • Alcohol use: Yes     Comment: rare   • Drug use: Never   • Sexual activity: Not Currently   Other Topics Concern   • None   Social History Narrative   • None     Social Determinants of Health     Financial Resource Strain: Not on file   Food Insecurity: Not on file   Transportation Needs: Not on file   Physical Activity: Not on file   Stress: Not on file   Social Connections: Not on file   Intimate Partner Violence: Not on file   Housing Stability: Not on file     Current Outpatient Medications on File Prior to Visit   Medication Sig   • Accu-Chek Guide test strip    • acetaminophen (TYLENOL) 500 mg tablet Take 500 mg by mouth every 4 (four) hours as needed   • Alcohol Swabs PADS As needed   • amLODIPine (NORVASC) 10 mg tablet    • aspirin (ECOTRIN LOW STRENGTH) 81 mg EC tablet Take 81 mg by mouth daily   • atorvastatin (LIPITOR) 40 mg tablet Take 40 mg by mouth daily   • Blood Glucose Monitoring Suppl (Accu-Chek Guide) w/Device KIT    • clopidogrel (PLAVIX) 75 mg tablet    • fenofibrate micronized (LOFIBRA) 134 MG capsule    • GNP Alcohol Swabs 70 % PADS    • insulin glargine (Toujeo Max SoloStar) 300 units/mL CONCENTRATED U-300 injection pen (2-unit dial) Inject 20 Units under the skin daily at bedtime   • lisinopril (ZESTRIL) 5 mg tablet Take 5 mg by mouth daily   • losartan (COZAAR) 25 mg tablet Take 1 tablet (25 mg total) by mouth daily   • [START ON 10/10/2023] nicotine (NICODERM CQ) 14 mg/24hr TD 24 hr patch Place 1 patch on the skin daily Do not start before October 10, 2023. • nicotine (NICODERM CQ) 21 mg/24 hr TD 24 hr patch Place 1 patch on the skin daily   • [START ON 10/24/2023] nicotine (NICODERM CQ) 7 mg/24hr TD 24 hr patch Place 1 patch on the skin daily Do not start before October 24, 2023.    • oxyCODONE (ROXICODONE) 5 immediate release tablet    • [DISCONTINUED] rosuvastatin (CRESTOR) 20 MG tablet Take 1 tablet (20 mg total) by mouth daily   • tamsulosin (FLOMAX) 0.4 mg Take 1 capsule (0.4 mg total) by mouth daily with dinner (Patient not taking: Reported on 9/12/2023)   • triamcinolone (KENALOG) 0.1 % cream Apply topically 2 (two) times a day (Patient not taking: Reported on 9/12/2023)   • [DISCONTINUED] amLODIPine (NORVASC) 5 mg tablet Take 1 tablet (5 mg total) by mouth daily (Patient not taking: Reported on 9/12/2023)   • [DISCONTINUED] atorvastatin (LIPITOR) 40 mg tablet  (Patient not taking: Reported on 9/12/2023)   • [DISCONTINUED] glipiZIDE-metFORMIN (METAGLIP) 2.5-500 MG per tablet Take 1 tablet by mouth 2 (two) times a day before meals   • [DISCONTINUED] nicotine (NICODERM CQ) 21 mg/24 hr TD 24 hr patch  (Patient not taking: Reported on 9/12/2023)     Allergies   Allergen Reactions   • Zolpidem Other (See Comments)     severe drowsiness     Immunization History   Administered Date(s) Administered   • COVID-19 MODERNA VACC 0.5 ML IM 04/26/2021, 05/24/2021, 12/27/2021   • Tdap 12/01/2020       Objective     /78 (BP Location: Left arm, Patient Position: Sitting, Cuff Size: Large)   Pulse 84   Temp (!) 96.1 °F (35.6 °C) (Tympanic)   Resp 16   Ht 6' 2" (1.88 m)   Wt 85.3 kg (188 lb)   SpO2 98%   BMI 24.14 kg/m²     Physical Exam  Vitals and nursing note reviewed. Constitutional:       General: He is not in acute distress. Appearance: Normal appearance. He is not ill-appearing. Comments: Patient in wheelchair   HENT:      Head: Normocephalic and atraumatic. Right Ear: Tympanic membrane and external ear normal.      Left Ear: Tympanic membrane and external ear normal.      Nose: Nose normal.      Mouth/Throat:      Mouth: Mucous membranes are moist.      Pharynx: No oropharyngeal exudate or posterior oropharyngeal erythema. Eyes:      General:         Right eye: No discharge. Left eye: No discharge. Extraocular Movements: Extraocular movements intact. Conjunctiva/sclera: Conjunctivae normal.      Pupils: Pupils are equal, round, and reactive to light.    Cardiovascular:      Rate and Rhythm: Normal rate and regular rhythm. Pulses: Normal pulses. Heart sounds: Normal heart sounds. No murmur heard. No friction rub. No gallop. Pulmonary:      Effort: Pulmonary effort is normal. No respiratory distress. Breath sounds: Normal breath sounds. Abdominal:      General: Abdomen is flat. There is no distension. Palpations: Abdomen is soft. There is no mass. Musculoskeletal:      Right lower leg: No edema. Left lower leg: No edema. Skin:     General: Skin is warm and dry. Findings: No erythema. Neurological:      Mental Status: He is alert and oriented to person, place, and time. Cranial Nerves: No cranial nerve deficit. Sensory: No sensory deficit. Motor: No weakness.       Comments: L proximal leg weakness   Psychiatric:         Mood and Affect: Mood normal.         Behavior: Behavior normal.

## 2023-09-19 ENCOUNTER — RA CDI HCC (OUTPATIENT)
Dept: OTHER | Facility: HOSPITAL | Age: 63
End: 2023-09-19

## 2023-09-19 NOTE — PROGRESS NOTES
[address] coding opportunities          Chart Reviewed number of suggestions sent to Provider: 3  E11.22  E11.29  Depression      Patients Insurance        Commercial Insurance: [employer]

## 2023-09-26 ENCOUNTER — OFFICE VISIT (OUTPATIENT)
Dept: FAMILY MEDICINE CLINIC | Facility: CLINIC | Age: 63
End: 2023-09-26
Payer: COMMERCIAL

## 2023-09-26 VITALS
RESPIRATION RATE: 16 BRPM | OXYGEN SATURATION: 99 % | HEART RATE: 104 BPM | SYSTOLIC BLOOD PRESSURE: 118 MMHG | BODY MASS INDEX: 24.56 KG/M2 | HEIGHT: 74 IN | TEMPERATURE: 97.6 F | WEIGHT: 191.4 LBS | DIASTOLIC BLOOD PRESSURE: 64 MMHG

## 2023-09-26 DIAGNOSIS — Z79.4 TYPE 2 DIABETES MELLITUS WITHOUT COMPLICATION, WITH LONG-TERM CURRENT USE OF INSULIN (HCC): ICD-10-CM

## 2023-09-26 DIAGNOSIS — Z79.4 TYPE 2 DIABETES MELLITUS WITH MICROALBUMINURIA, WITH LONG-TERM CURRENT USE OF INSULIN (HCC): ICD-10-CM

## 2023-09-26 DIAGNOSIS — H61.23 BILATERAL IMPACTED CERUMEN: ICD-10-CM

## 2023-09-26 DIAGNOSIS — R80.9 TYPE 2 DIABETES MELLITUS WITH MICROALBUMINURIA, WITH LONG-TERM CURRENT USE OF INSULIN (HCC): ICD-10-CM

## 2023-09-26 DIAGNOSIS — I63.89 CEREBROVASCULAR ACCIDENT (CVA) DUE TO OTHER MECHANISM (HCC): Primary | ICD-10-CM

## 2023-09-26 DIAGNOSIS — I69.354 HEMIPARESIS AFFECTING LEFT SIDE AS LATE EFFECT OF CEREBROVASCULAR ACCIDENT (CVA) (HCC): ICD-10-CM

## 2023-09-26 DIAGNOSIS — E11.29 TYPE 2 DIABETES MELLITUS WITH MICROALBUMINURIA, WITH LONG-TERM CURRENT USE OF INSULIN (HCC): ICD-10-CM

## 2023-09-26 DIAGNOSIS — E11.9 TYPE 2 DIABETES MELLITUS WITHOUT COMPLICATION, WITH LONG-TERM CURRENT USE OF INSULIN (HCC): ICD-10-CM

## 2023-09-26 DIAGNOSIS — R11.0 NAUSEA: ICD-10-CM

## 2023-09-26 PROCEDURE — 99214 OFFICE O/P EST MOD 30 MIN: CPT | Performed by: FAMILY MEDICINE

## 2023-09-26 RX ORDER — ONDANSETRON 4 MG/1
4 TABLET, FILM COATED ORAL EVERY 8 HOURS PRN
Qty: 20 TABLET | Refills: 0 | Status: SHIPPED | OUTPATIENT
Start: 2023-09-26

## 2023-09-26 RX ORDER — INSULIN GLARGINE 300 U/ML
30 INJECTION, SOLUTION SUBCUTANEOUS
Qty: 6 ML | Refills: 3
Start: 2023-09-26

## 2023-09-26 RX ORDER — GABAPENTIN 100 MG/1
CAPSULE ORAL
COMMUNITY
Start: 2023-09-13

## 2023-09-26 RX ORDER — CLOPIDOGREL BISULFATE 75 MG/1
75 TABLET ORAL DAILY
Qty: 30 TABLET | Refills: 0 | Status: SHIPPED | OUTPATIENT
Start: 2023-09-26

## 2023-09-26 NOTE — ASSESSMENT & PLAN NOTE
S/p CVA on 08/29. Patient with continued L sided weakness. He is advised to continue with both ASA and plavix until his cardiology appointment. Patient advised to also follow up with neurology. Continue to monitor. Patient can contact the office with any concerns.

## 2023-09-26 NOTE — ASSESSMENT & PLAN NOTE
Patient with complaints of nausea today. He is given Zofran which he can take as needed for nausea. Continue to monitor. The patient was seen and examined independently. Please read my note for additional detail. The plan was discussed with APC. Walking in room. wants to go home. No chest or abdominal pain currently. No nausea or vomiting. No dizziness or SOB. Family is at bedside. /70   Pulse 61   Temp 97.6 °F (36.4 °C)   Resp 20   Ht 6' (1.829 m)   Wt 114.3 kg (251 lb 14.4 oz)   SpO2 97%   BMI 34.16 kg/m²     General appearance - alert, well appearing, and in no distress  Chest - good air entry noted in bases, no wheezes  Heart - S1 and S2 normal  Abdomen - soft, nontender, nondistended, Bowel sounds present  Neurological - alert, oriented, normal speech, no focal findings noted  Musculoskeletal - no joint tenderness or swelling of knees  Extremities - no pedal edema noted    1. New onset Afib with RVR - rate controlled, now in NSR. cont current management. CHADS-2 score is 2. Waiting Cardiology input to decide about anticoagulation. 2. Scalp boil sec to community acquired MRSA - cont Bactrim for 5 more days   3. NSTEMI s/p Diagonal branch ROSA MARIA.    --> can be discharged today once cleared by cardiology.

## 2023-09-26 NOTE — PROGRESS NOTES
Diabetic Foot Exam    Patient's shoes and socks removed. Right Foot/Ankle   Right Foot Inspection  Skin Exam: skin normal and skin intact. No dry skin, no warmth, no callus, no erythema, no maceration, no abnormal color, no pre-ulcer, no ulcer and no callus. Toe Exam: ROM and strength within normal limits. Sensory   Monofilament testing: intact    Vascular  Capillary refills: < 3 seconds  The right DP pulse is 2+. Left Foot/Ankle  Left Foot Inspection  Skin Exam: skin normal and skin intact. No dry skin, no warmth, no erythema, no maceration, normal color, no pre-ulcer, no ulcer and no callus. Toe Exam: ROM and strength within normal limits. Sensory   Monofilament testing: diminished    Vascular  Capillary refills: < 3 seconds  The left DP pulse is 2+.      Assign Risk Category  No deformity present  No loss of protective sensation  No weak pulses  Risk: 0

## 2023-09-26 NOTE — PROGRESS NOTES
Name: Cristóbal Garrett      : [id number]      MRN: [id number]  Encounter Provider: Janet Hercules MD  Encounter Date: 2023   Encounter department: [facility]     1. Cerebrovascular accident (CVA) due to other mechanism [facility])  Assessment & Plan:  S/p CVA on . Patient with continued L sided weakness. He is advised to continue with both ASA and plavix until his cardiology appointment. Patient advised to also follow up with neurology. Continue to monitor. Patient can contact the office with any concerns. Orders:  -     clopidogrel (PLAVIX) 75 mg tablet; Take 1 tablet (75 mg total) by mouth daily    2. Type 2 diabetes mellitus with microalbuminuria, with long-term current use of insulin ([address])  Assessment & Plan:  Patient with A1C of 10.5 on . Patient is currently taking Lantus 30 units at bedtime. He reports most recent morning sugar of 109. Patient will continue with Lantus 30 units daily. Advised patient to also take blood sugars 2 hours after dinner. We will think about further adjustments at follow up visit. Follow up in 2 weeks. Lab Results   Component Value Date    HGBA1C 10.5 (H) 2023       Orders:  -     Hemoglobin A1C; Future; Expected date: 2023  -     Lipid Panel with Direct LDL reflex; Future; Expected date: 2023  -     Comprehensive metabolic panel; Future; Expected date: 2023  -     Albumin / creatinine urine ratio; Future; Expected date: 2023    3. Hemiparesis affecting left side as late effect of cerebrovascular accident (CVA) [facility])  Assessment & Plan:  Patient with residual L sided weakness after CVA on . Patient has referral for PT. He is advised to attend his appointment on 10/3. Continue to monitor. Follow up visit in 2 weeks. 4. Nausea  Assessment & Plan:  Patient with complaints of nausea today. He is given Zofran which he can take as needed for nausea. Continue to monitor.      Orders:  - ondansetron (ZOFRAN) 4 mg tablet; Take 1 tablet (4 mg total) by mouth every 8 (eight) hours as needed for nausea or vomiting    5. Bilateral impacted cerumen  Assessment & Plan:  Patient with complaint of b/l ear pains for 1 week. He notes decreased hearing in L ear. Cerumen too deep to remove in office today. Advised patient to try otc Debrox for softening/removal of ear wax. Patient is to return for follow up visit in 2 weeks. We will reassess at that time. 6. Type 2 diabetes mellitus without complication, with long-term current use of insulin (HCC)  -     insulin glargine (Toujeo Max SoloStar) 300 units/mL CONCENTRATED U-300 injection pen (2-unit dial); Inject 30 Units under the skin daily at bedtime           Subjective     EM is a 58year old male with a PMH of HTN, HLD, DM2, and s/p CVA on 8/29 who presents to the office for a 2 week follow up visit of his multiple medical conditions. Patient reports that he was having some trouble with walking on 09/14 which lead him to go to the ED. Patient states that he left the ED AMA and denies ever having any R sided weakness. He states having continued pain in the L shoulder and back. Patient is scheduled to see PT next week. Patient reports that he is currently taking 30 units of lantus at bedtime. He has been taking this dosage for 3 days and his blood sugar this morning was 109. Patient does mention that he is getting sugars around 180 before dinner. Patient reports that that he is currently taking both ASA and plavix as instructed to at the last visit. He has his cardiology appointment in 2 weeks and neurology appointment in about 4 months. Patient additionally notes that he has been having ear pains for the last week. He says that the L ear has been especially worse for the past 4 days. Patient also mentions that he has been experiencing some nausea today. Review of Systems   Constitutional: Negative for chills and fever.    HENT: Positive for ear pain and hearing loss. Negative for rhinorrhea and sore throat. Eyes: Negative for visual disturbance. Respiratory: Negative for cough and shortness of breath. Cardiovascular: Negative for chest pain, palpitations and leg swelling. Gastrointestinal: Negative for abdominal pain, diarrhea, nausea and vomiting. Endocrine: Negative for polydipsia, polyphagia and polyuria. Genitourinary: Negative for dysuria and hematuria. Musculoskeletal: Positive for myalgias. Negative for arthralgias. Neurological: Negative for dizziness, seizures, syncope and headaches.        Past Medical History:   Diagnosis Date   • Diabetes mellitus ([address])    • HLD (hyperlipidemia)    • Hypertension      Past Surgical History:   Procedure Laterality Date   • ANKLE SURGERY Right    • BACK SURGERY      L4-L5   • NECK SURGERY       Family History   Problem Relation Age of Onset   • Diabetes Mother    • Diabetes Father    • Heart disease Maternal Grandfather    • Heart disease Paternal Grandfather      Social History     Socioeconomic History   • Marital status: /Civil Union     Spouse name: None   • Number of children: None   • Years of education: None   • Highest education level: None   Occupational History   • None   Tobacco Use   • Smoking status: Every Day     Packs/day: 1.00     Types: Cigarettes   • Smokeless tobacco: Never   Vaping Use   • Vaping Use: Never used   Substance and Sexual Activity   • Alcohol use: Yes     Comment: rare   • Drug use: Never   • Sexual activity: Not Currently   Other Topics Concern   • None   Social History Narrative   • None     Social Determinants of Health     Financial Resource Strain: Not on file   Food Insecurity: Not on file   Transportation Needs: Not on file   Physical Activity: Not on file   Stress: Not on file   Social Connections: Not on file   Intimate Partner Violence: Not on file   Housing Stability: Not on file     Current Outpatient Medications on File Prior to Visit   Medication Sig   • Accu-Chek Guide test strip    • acetaminophen (TYLENOL) 500 mg tablet Take 500 mg by mouth every 4 (four) hours as needed   • Alcohol Swabs PADS As needed   • ALPRAZolam (XANAX) 0.5 mg tablet Take 1 tablet (0.5 mg total) by mouth daily as needed for anxiety   • amLODIPine (NORVASC) 10 mg tablet    • aspirin (ECOTRIN LOW STRENGTH) 81 mg EC tablet Take 81 mg by mouth daily   • atorvastatin (LIPITOR) 40 mg tablet Take 40 mg by mouth daily   • Blood Glucose Monitoring Suppl (Accu-Chek Guide) w/Device KIT    • fenofibrate micronized (LOFIBRA) 134 MG capsule    • gabapentin (NEURONTIN) 100 mg capsule    • GNP Alcohol Swabs 70 % PADS    • lisinopril (ZESTRIL) 5 mg tablet Take 5 mg by mouth daily   • losartan (COZAAR) 25 mg tablet Take 1 tablet (25 mg total) by mouth daily   • nicotine (NICODERM CQ) 21 mg/24 hr TD 24 hr patch Place 1 patch on the skin daily   • [DISCONTINUED] clopidogrel (PLAVIX) 75 mg tablet    • [START ON 10/10/2023] nicotine (NICODERM CQ) 14 mg/24hr TD 24 hr patch Place 1 patch on the skin daily Do not start before October 10, 2023. (Patient not taking: Reported on 9/26/2023 Do not start before October 10, 2023.)   • [START ON 10/24/2023] nicotine (NICODERM CQ) 7 mg/24hr TD 24 hr patch Place 1 patch on the skin daily Do not start before October 24, 2023.  (Patient not taking: Reported on 9/26/2023 Do not start before October 24, 2023.)   • oxyCODONE (ROXICODONE) 5 immediate release tablet  (Patient not taking: Reported on 9/26/2023)   • tamsulosin (FLOMAX) 0.4 mg Take 1 capsule (0.4 mg total) by mouth daily with dinner (Patient not taking: Reported on 9/12/2023)   • triamcinolone (KENALOG) 0.1 % cream Apply topically 2 (two) times a day (Patient not taking: Reported on 9/12/2023)   • [DISCONTINUED] insulin glargine (Toujeo Max SoloStar) 300 units/mL CONCENTRATED U-300 injection pen (2-unit dial) Inject 20 Units under the skin daily at bedtime (Patient taking differently: Inject 30 Units under the skin daily at bedtime)     Allergies   Allergen Reactions   • Zolpidem Other (See Comments)     severe drowsiness     Immunization History   Administered Date(s) Administered   • COVID-19 MODERNA VACC 0.5 ML IM 04/26/2021, 05/24/2021, 12/27/2021   • Tdap 12/01/2020       Objective     /64 (BP Location: Left arm, Patient Position: Sitting, Cuff Size: Standard)   Pulse 104   Temp 97.6 °F (36.4 °C) (Tympanic)   Resp 16   Ht 6' 2" (1.88 m)   Wt 86.8 kg (191 lb 6.4 oz)   SpO2 99%   BMI 24.57 kg/m²     Physical Exam  Vitals and nursing note reviewed. Constitutional:       General: He is not in acute distress. Appearance: Normal appearance. He is not ill-appearing. HENT:      Head: Normocephalic and atraumatic. Right Ear: External ear normal. There is impacted cerumen. Left Ear: External ear normal. There is impacted cerumen. Cardiovascular:      Rate and Rhythm: Normal rate and regular rhythm. Pulses: Normal pulses. Heart sounds: Normal heart sounds. No murmur heard. No friction rub. No gallop. Pulmonary:      Effort: Pulmonary effort is normal. No respiratory distress. Breath sounds: Normal breath sounds. Musculoskeletal:         General: Normal range of motion. Right lower leg: No edema. Left lower leg: No edema. Skin:     General: Skin is warm and dry. Findings: No erythema or lesion. Neurological:      General: No focal deficit present. Mental Status: He is alert and oriented to person, place, and time. Sensory: No sensory deficit. Motor: Weakness (L sided) present.    Psychiatric:         Mood and Affect: Mood normal.         Behavior: Behavior normal.       Deborah Galvan MD

## 2023-09-26 NOTE — ASSESSMENT & PLAN NOTE
Patient with complaint of b/l ear pains for 1 week. He notes decreased hearing in L ear. Cerumen too deep to remove in office today. Advised patient to try otc Debrox for softening/removal of ear wax. Patient is to return for follow up visit in 2 weeks. We will reassess at that time.

## 2023-09-26 NOTE — ASSESSMENT & PLAN NOTE
Patient with A1C of 10.5 on 8/29. Patient is currently taking Lantus 30 units at bedtime. He reports most recent morning sugar of 109. Patient will continue with Lantus 30 units daily. Advised patient to also take blood sugars 2 hours after dinner. We will think about further adjustments at follow up visit. Follow up in 2 weeks.    Lab Results   Component Value Date    HGBA1C 10.5 (H) 08/29/2023

## 2023-09-26 NOTE — ASSESSMENT & PLAN NOTE
Patient with residual L sided weakness after CVA on 08/29. Patient has referral for PT. He is advised to attend his appointment on 10/3. Continue to monitor. Follow up visit in 2 weeks.

## 2023-10-03 DIAGNOSIS — F41.9 ANXIETY: ICD-10-CM

## 2023-10-03 NOTE — TELEPHONE ENCOUNTER
Last seen 9/26/23  Has appt 10/10/23      [address] [facility] [place]           Search Criteria    NAME DATE OF BIRTH DATE RANGE   Gi Prajapati 29- 10- To 10-     REQUESTER NAME REQUESTED DATE   Jenelle Diaz Tue Oct 03 2023 10:17:35 GMT-0400 Aj [facility] Daylight Time)     Summary  Prescriptions  2  Prescribers  2  Pharmacies  1  View Map  Drug Classes  Benzodiazepines  1  Stimulants  0  Opioids  1  Muscle Relaxants  0  Opioid Dosage  Total MME for Active Prescriptions  0    Average MME  22.50  MME Graph   MME Calculator     • Prescriptions  • Notifications    • Prescribers  • Pharmacies  • MME Graph    [] [place] Drug Categories:     [] Benzodiazepines      [] Opioids      Showentries  Search:  PATIENT ID PRESCRIPTION # FILLED WRITTEN DRUG LABEL QTY DAYS STRENGTH MME** PRESCRIBER PHARMACY PAYMENT REFILL #/AUTH STATE DETAIL   1 [id number] 09/12/2023 09/12/2023 ALPRAZolam (Tablet) 30.0 30 0.5 MG NA ALICJA GRAFFIN [facility] Commercial Insurance 0 / 0 [place]    1 [id number] 09/05/2023 09/05/2023 oxyCODONE HCL (Tablet) 9.0 3 5 MG 22.50 PROMISE JOSE JEN [facility] Commercial Insu

## 2023-10-06 RX ORDER — ALPRAZOLAM 0.5 MG/1
0.5 TABLET ORAL DAILY PRN
Qty: 30 TABLET | Refills: 0 | Status: SHIPPED | OUTPATIENT
Start: 2023-10-06

## 2023-10-10 ENCOUNTER — OFFICE VISIT (OUTPATIENT)
Dept: FAMILY MEDICINE CLINIC | Facility: CLINIC | Age: 63
End: 2023-10-10
Payer: COMMERCIAL

## 2023-10-10 ENCOUNTER — TELEPHONE (OUTPATIENT)
Dept: FAMILY MEDICINE CLINIC | Facility: CLINIC | Age: 63
End: 2023-10-10

## 2023-10-10 VITALS
HEART RATE: 94 BPM | RESPIRATION RATE: 16 BRPM | BODY MASS INDEX: 24.9 KG/M2 | SYSTOLIC BLOOD PRESSURE: 134 MMHG | TEMPERATURE: 97.1 F | OXYGEN SATURATION: 97 % | WEIGHT: 194 LBS | HEIGHT: 74 IN | DIASTOLIC BLOOD PRESSURE: 70 MMHG

## 2023-10-10 DIAGNOSIS — E78.49 OTHER HYPERLIPIDEMIA: ICD-10-CM

## 2023-10-10 DIAGNOSIS — E11.29 TYPE 2 DIABETES MELLITUS WITH MICROALBUMINURIA, WITH LONG-TERM CURRENT USE OF INSULIN (HCC): Primary | ICD-10-CM

## 2023-10-10 DIAGNOSIS — Z79.4 TYPE 2 DIABETES MELLITUS WITH MICROALBUMINURIA, WITH LONG-TERM CURRENT USE OF INSULIN (HCC): Primary | ICD-10-CM

## 2023-10-10 DIAGNOSIS — Z23 IMMUNIZATION DUE: ICD-10-CM

## 2023-10-10 DIAGNOSIS — R80.9 TYPE 2 DIABETES MELLITUS WITH MICROALBUMINURIA, WITH LONG-TERM CURRENT USE OF INSULIN (HCC): Primary | ICD-10-CM

## 2023-10-10 DIAGNOSIS — I10 ESSENTIAL HYPERTENSION: ICD-10-CM

## 2023-10-10 DIAGNOSIS — R53.1 WEAKNESS OF LEFT SIDE OF BODY: ICD-10-CM

## 2023-10-10 LAB — SL AMB POCT HEMOGLOBIN AIC: 9.1 (ref ?–6.5)

## 2023-10-10 PROCEDURE — 90686 IIV4 VACC NO PRSV 0.5 ML IM: CPT

## 2023-10-10 PROCEDURE — 83036 HEMOGLOBIN GLYCOSYLATED A1C: CPT | Performed by: FAMILY MEDICINE

## 2023-10-10 PROCEDURE — 99214 OFFICE O/P EST MOD 30 MIN: CPT | Performed by: FAMILY MEDICINE

## 2023-10-10 PROCEDURE — 90471 IMMUNIZATION ADMIN: CPT

## 2023-10-10 NOTE — PROGRESS NOTES
Name: Dilan Torres      :       MRN: [id number]  Encounter Provider: Felipe Demarco MD  Encounter Date: 10/10/2023   Encounter department: [facility]     1. Type 2 diabetes mellitus with microalbuminuria, with long-term current use of insulin ([address])  Assessment & Plan:  Not well controlled but A1c improved from before. Continue on Lantus 31 units at bedtime. Continue to improve his diet. Referral to see diabetic education. Lab Results   Component Value Date    HGBA1C 9.1 (A) 10/10/2023       Orders:  -     POCT hemoglobin A1c  -     Ambulatory referral to Diabetic Education - use to refer for diabetes group classes, individual diabetes education, medical nutrition therapy, device training; Future; Expected date: 10/10/2023    2. Immunization due  -     influenza vaccine, quadrivalent, 0.5 mL, preservative-free, for adult and pediatric patients 6 mos+ (AFLURIA, FLUARIX, FLULAVAL, FLUZONE)    3. Essential hypertension  Assessment & Plan:  Blood pressure is well controlled on amlodipine and lisinopril. Continue same and we will continue to monitor. 4. Weakness of left side of body  Assessment & Plan:  Improving slightly. Continue with physical therapy. 5. Other hyperlipidemia  Assessment & Plan:  Continue on atorvastatin and fenofibrate. We will continue to monitor his fasting lipid profile. Subjective     Is here today for follow-up multiple medical problems. He has been taking his medications. He denies any side effect from his medications. He has been watching his diet and using Lantus 31 units at bedtime. His blood sugar measurement improved in the morning. They are slightly elevated before supper but improved from before. He did not see his cardiologist yet. Review of Systems   Constitutional: Negative for chills and fever. HENT: Negative for trouble swallowing. Eyes: Negative for visual disturbance. Respiratory: Negative for cough and shortness of breath. Cardiovascular: Negative for chest pain and palpitations. Gastrointestinal: Negative for abdominal pain, blood in stool and vomiting. Endocrine: Negative for cold intolerance and heat intolerance. Genitourinary: Negative for difficulty urinating and dysuria. Musculoskeletal: Positive for gait problem. Skin: Negative for rash. Neurological: Positive for weakness. Negative for dizziness, syncope and headaches. Hematological: Negative for adenopathy. Psychiatric/Behavioral: Negative for behavioral problems.        Past Medical History:   Diagnosis Date   • Diabetes mellitus ([address])    • HLD (hyperlipidemia)    • Hypertension      Past Surgical History:   Procedure Laterality Date   • ANKLE SURGERY Right    • BACK SURGERY      L4-L5   • NECK SURGERY       Family History   Problem Relation Age of Onset   • Diabetes Mother    • Diabetes Father    • Heart disease Maternal Grandfather    • Heart disease Paternal Grandfather      Social History     Socioeconomic History   • Marital status: /Civil Union     Spouse name: None   • Number of children: None   • Years of education: None   • Highest education level: None   Occupational History   • None   Tobacco Use   • Smoking status: Every Day     Packs/day: 1.00     Types: Cigarettes   • Smokeless tobacco: Never   Vaping Use   • Vaping Use: Never used   Substance and Sexual Activity   • Alcohol use: Yes     Comment: rare   • Drug use: Never   • Sexual activity: Not Currently   Other Topics Concern   • None   Social History Narrative   • None     Social Determinants of Health     Financial Resource Strain: Not on file   Food Insecurity: Not on file   Transportation Needs: Not on file   Physical Activity: Not on file   Stress: Not on file   Social Connections: Not on file   Intimate Partner Violence: Not on file   Housing Stability: Not on file     Current Outpatient Medications on File Prior to Visit   Medication Sig   • Accu-Chek Guide test strip    • acetaminophen (TYLENOL) 500 mg tablet Take 500 mg by mouth every 4 (four) hours as needed   • Alcohol Swabs PADS As needed   • ALPRAZolam (XANAX) 0.5 mg tablet Take 1 tablet (0.5 mg total) by mouth daily as needed for anxiety   • amLODIPine (NORVASC) 10 mg tablet    • aspirin (ECOTRIN LOW STRENGTH) 81 mg EC tablet Take 81 mg by mouth daily   • atorvastatin (LIPITOR) 40 mg tablet Take 40 mg by mouth daily   • Blood Glucose Monitoring Suppl (Accu-Chek Guide) w/Device KIT    • clopidogrel (PLAVIX) 75 mg tablet Take 1 tablet (75 mg total) by mouth daily   • fenofibrate micronized (LOFIBRA) 134 MG capsule    • gabapentin (NEURONTIN) 100 mg capsule    • GNP Alcohol Swabs 70 % PADS    • insulin glargine (Toujeo Max SoloStar) 300 units/mL CONCENTRATED U-300 injection pen (2-unit dial) Inject 30 Units under the skin daily at bedtime (Patient taking differently: Inject 31 Units under the skin daily at bedtime)   • lisinopril (ZESTRIL) 5 mg tablet Take 5 mg by mouth daily   • nicotine (NICODERM CQ) 21 mg/24 hr TD 24 hr patch Place 1 patch on the skin daily   • ondansetron (ZOFRAN) 4 mg tablet Take 1 tablet (4 mg total) by mouth every 8 (eight) hours as needed for nausea or vomiting   • [DISCONTINUED] losartan (COZAAR) 25 mg tablet Take 1 tablet (25 mg total) by mouth daily   • nicotine (NICODERM CQ) 14 mg/24hr TD 24 hr patch Place 1 patch on the skin daily Do not start before October 10, 2023. (Patient not taking: Reported on 9/26/2023)   • [START ON 10/24/2023] nicotine (NICODERM CQ) 7 mg/24hr TD 24 hr patch Place 1 patch on the skin daily Do not start before October 24, 2023.  (Patient not taking: Reported on 9/26/2023 Do not start before October 24, 2023.)   • oxyCODONE (ROXICODONE) 5 immediate release tablet  (Patient not taking: Reported on 9/26/2023)   • tamsulosin (FLOMAX) 0.4 mg Take 1 capsule (0.4 mg total) by mouth daily with dinner (Patient not taking: Reported on 9/12/2023)   • triamcinolone (KENALOG) 0.1 % cream Apply topically 2 (two) times a day (Patient not taking: Reported on 9/12/2023)     Allergies   Allergen Reactions   • Zolpidem Other (See Comments)     severe drowsiness     Immunization History   Administered Date(s) Administered   • COVID-19 MODERNA VACC 0.5 ML IM 04/26/2021, 05/24/2021, 12/27/2021   • Influenza, injectable, quadrivalent, preservative free 0.5 mL 10/10/2023   • Tdap 12/01/2020       Objective     /70 (BP Location: Left arm, Patient Position: Sitting, Cuff Size: Large)   Pulse 94   Temp (!) 97.1 °F (36.2 °C) (Tympanic)   Resp 16   Ht 6' 2" (1.88 m)   Wt 88 kg (194 lb)   SpO2 97%   BMI 24.91 kg/m²     Physical Exam  Vitals and nursing note reviewed. Constitutional:       Appearance: He is well-developed. HENT:      Head: Normocephalic and atraumatic. Eyes:      Pupils: Pupils are equal, round, and reactive to light. Cardiovascular:      Rate and Rhythm: Normal rate and regular rhythm. Heart sounds: Normal heart sounds. Pulmonary:      Effort: Pulmonary effort is normal.      Breath sounds: Normal breath sounds. Abdominal:      General: Bowel sounds are normal.      Palpations: Abdomen is soft. Musculoskeletal:      Cervical back: Normal range of motion and neck supple. Right lower leg: No edema. Left lower leg: No edema. Lymphadenopathy:      Cervical: No cervical adenopathy. Skin:     General: Skin is warm. Findings: No rash. Neurological:      Mental Status: He is alert and oriented to person, place, and time.        Charisse Holter, MD

## 2023-10-10 NOTE — ASSESSMENT & PLAN NOTE
Blood pressure is well controlled on amlodipine and lisinopril. Continue same and we will continue to monitor.

## 2023-10-10 NOTE — ASSESSMENT & PLAN NOTE
Not well controlled but A1c improved from before. Continue on Lantus 31 units at bedtime. Continue to improve his diet. Referral to see diabetic education.   Lab Results   Component Value Date    HGBA1C 9.1 (A) 10/10/2023

## 2023-10-10 NOTE — TELEPHONE ENCOUNTER
Hi, this is [facility]. So my  Oziel Hollis was just in at 12:00 today I Tuesday and I have a question about the referral. I was given to go for a diabetic education, but there's no phone number for me to call, so I don't know who to schedule this with. And also, I was just wondering if Doctor Absalom ordered blood work for my . I was so sure. So if you could please give me a call back [phone]. Again, this is the wife of Oziel Hollis. Please call me back. Thank you.  paul Marsh.

## 2023-10-11 NOTE — TELEPHONE ENCOUNTER
Pt and wife given phone number for diabetic education. Pt aware fasting lab order placed and to be done prior to next appt.

## 2023-10-16 DIAGNOSIS — E11.9 TYPE 2 DIABETES MELLITUS WITHOUT COMPLICATION, WITH LONG-TERM CURRENT USE OF INSULIN (HCC): ICD-10-CM

## 2023-10-16 DIAGNOSIS — Z79.4 TYPE 2 DIABETES MELLITUS WITHOUT COMPLICATION, WITH LONG-TERM CURRENT USE OF INSULIN (HCC): ICD-10-CM

## 2023-10-16 RX ORDER — INSULIN GLARGINE 300 U/ML
30 INJECTION, SOLUTION SUBCUTANEOUS
Qty: 6 ML | Refills: 3 | Status: SHIPPED | OUTPATIENT
Start: 2023-10-16

## 2023-10-23 DIAGNOSIS — I63.89 CEREBROVASCULAR ACCIDENT (CVA) DUE TO OTHER MECHANISM (HCC): ICD-10-CM

## 2023-10-23 RX ORDER — CLOPIDOGREL BISULFATE 75 MG/1
75 TABLET ORAL DAILY
Qty: 30 TABLET | Refills: 0 | Status: SHIPPED | OUTPATIENT
Start: 2023-10-23

## 2023-10-27 ENCOUNTER — TELEPHONE (OUTPATIENT)
Dept: FAMILY MEDICINE CLINIC | Facility: CLINIC | Age: 63
End: 2023-10-27

## 2023-10-27 NOTE — TELEPHONE ENCOUNTER
Hi, my name is Mary Wong Junior. My birth date is [birth year] and my phone number is [phone]. I'm calling a couple days ago. You sent in a prescription refill for my insulin and they gave me something different. They gave me Should [place] and I take Lantus and I was wondering if you guys changed my insulin, but it does, the dosages are the units are different. Alright. Can you just call me back please, because I'm actually out of insulin right now. Thank you.

## 2023-10-27 NOTE — TELEPHONE ENCOUNTER
Pt was taking lantus up to 32 units before bed 6 pens 100 units each , pt received toujeo inject 30 units   2  pens with 300 units each. Pt did not have "toujeo " in past . He wanted to confirm this is the correct medication and dose.

## 2023-11-06 DIAGNOSIS — F41.9 ANXIETY: ICD-10-CM

## 2023-11-06 RX ORDER — ALPRAZOLAM 0.5 MG/1
0.5 TABLET ORAL DAILY PRN
Qty: 30 TABLET | Refills: 0 | Status: SHIPPED | OUTPATIENT
Start: 2023-11-06

## 2023-11-06 NOTE — TELEPHONE ENCOUNTER
Pharmacy requesting refill on Xanax   Last seen 10/10/23     1 GRACE SERRANO [address]- [facility] [place] [address]GO[id number] [facility] [place]      Search Criteria  NAME DATE OF BIRTH DATE RANGE  Jl Jon 05- 11- To 11-  REQUESTER NAME REQUESTED DATE  Mariana Coronel Mon Nov 06 2023 10:04:45 GMT-0500 [facility] [facility]arc[facility] Standard Time)  Summary  Prescriptions  3  Prescribers  3  Pharmacies  1  Drug Classes  Benzodiazepines  2  Stimulants  0  Opioids  1  Muscle Relaxants  0  Opioid Dosage  Total MME for Active Prescriptions  0    Average MME  22.50         Prescriptions  Notifications    Prescribers  Pharmacies  MME Graph    Show All     [place]   Drug Categories:      Benzodiazepines       Opioids     Show  10  entries  Search:  PATIENT ID PRESCRIPTION # FILLED WRITTEN DRUG LABEL QTY DAYS STRENGTH MME** PRESCRIBER PHARMACY PAYMENT REFILL #/AUTH STATE DETAIL  1 [id number] 10/10/2023 10/06/2023 ALPRAZolam (Tablet) 30.0 30 0.5 MG NA MARIANNA) [facility] Commercial Insurance 0 / 0 [place]   1 [id number] 09/12/2023 09/12/2023 ALPRAZolam (Tablet) 30.0 30 0.5 MG NA ALICJA K[facility] Commercial Insurance 0 / 0 [place]   1 [id number] 09/05/2023 09/05/2023 oxyCODONE HCL (Tablet) 9.0 3 5 MG 22.50 PROMISE RIVERA [facility] Commerc

## 2023-11-20 ENCOUNTER — APPOINTMENT (OUTPATIENT)
Dept: LAB | Facility: IMAGING CENTER | Age: 63
End: 2023-11-20
Payer: COMMERCIAL

## 2023-11-20 DIAGNOSIS — R80.9 TYPE 2 DIABETES MELLITUS WITH MICROALBUMINURIA, WITH LONG-TERM CURRENT USE OF INSULIN (HCC): ICD-10-CM

## 2023-11-20 DIAGNOSIS — Z12.5 PROSTATE CANCER SCREENING: ICD-10-CM

## 2023-11-20 DIAGNOSIS — E11.65 TYPE 2 DIABETES MELLITUS WITH HYPERGLYCEMIA, WITH LONG-TERM CURRENT USE OF INSULIN (HCC): ICD-10-CM

## 2023-11-20 DIAGNOSIS — Z79.4 TYPE 2 DIABETES MELLITUS WITH MICROALBUMINURIA, WITH LONG-TERM CURRENT USE OF INSULIN (HCC): ICD-10-CM

## 2023-11-20 DIAGNOSIS — E11.29 TYPE 2 DIABETES MELLITUS WITH MICROALBUMINURIA, WITH LONG-TERM CURRENT USE OF INSULIN (HCC): ICD-10-CM

## 2023-11-20 DIAGNOSIS — Z79.4 TYPE 2 DIABETES MELLITUS WITH HYPERGLYCEMIA, WITH LONG-TERM CURRENT USE OF INSULIN (HCC): ICD-10-CM

## 2023-11-20 LAB
ALBUMIN SERPL BCP-MCNC: 4.3 G/DL (ref 3.5–5)
ALP SERPL-CCNC: 61 U/L (ref 34–104)
ALT SERPL W P-5'-P-CCNC: 9 U/L (ref 7–52)
ANION GAP SERPL CALCULATED.3IONS-SCNC: 6 MMOL/L
AST SERPL W P-5'-P-CCNC: 6 U/L (ref 13–39)
BASOPHILS # BLD AUTO: 0.06 THOUSANDS/ÂΜL (ref 0–0.1)
BASOPHILS NFR BLD AUTO: 1 % (ref 0–1)
BILIRUB SERPL-MCNC: 0.51 MG/DL (ref 0.2–1)
BUN SERPL-MCNC: 12 MG/DL (ref 5–25)
CALCIUM SERPL-MCNC: 9.7 MG/DL (ref 8.4–10.2)
CHLORIDE SERPL-SCNC: 104 MMOL/L (ref 96–108)
CHOLEST SERPL-MCNC: 126 MG/DL
CO2 SERPL-SCNC: 31 MMOL/L (ref 21–32)
CREAT SERPL-MCNC: 1.05 MG/DL (ref 0.6–1.3)
CREAT UR-MCNC: 132 MG/DL
EOSINOPHIL # BLD AUTO: 0.42 THOUSAND/ÂΜL (ref 0–0.61)
EOSINOPHIL NFR BLD AUTO: 5 % (ref 0–6)
ERYTHROCYTE [DISTWIDTH] IN BLOOD BY AUTOMATED COUNT: 13.3 % (ref 11.6–15.1)
EST. AVERAGE GLUCOSE BLD GHB EST-MCNC: 169 MG/DL
GFR SERPL CREATININE-BSD FRML MDRD: 74 ML/MIN/1.73SQ M
GLUCOSE P FAST SERPL-MCNC: 126 MG/DL (ref 65–99)
HBA1C MFR BLD: 7.5 %
HCT VFR BLD AUTO: 44.8 % (ref 36.5–49.3)
HDLC SERPL-MCNC: 52 MG/DL
HGB BLD-MCNC: 15 G/DL (ref 12–17)
IMM GRANULOCYTES # BLD AUTO: 0.04 THOUSAND/UL (ref 0–0.2)
IMM GRANULOCYTES NFR BLD AUTO: 1 % (ref 0–2)
LDLC SERPL CALC-MCNC: 61 MG/DL (ref 0–100)
LYMPHOCYTES # BLD AUTO: 1.77 THOUSANDS/ÂΜL (ref 0.6–4.47)
LYMPHOCYTES NFR BLD AUTO: 22 % (ref 14–44)
MCH RBC QN AUTO: 29.4 PG (ref 26.8–34.3)
MCHC RBC AUTO-ENTMCNC: 33.5 G/DL (ref 31.4–37.4)
MCV RBC AUTO: 88 FL (ref 82–98)
MICROALBUMIN UR-MCNC: 23.9 MG/L
MICROALBUMIN/CREAT 24H UR: 18 MG/G CREATININE (ref 0–30)
MONOCYTES # BLD AUTO: 0.7 THOUSAND/ÂΜL (ref 0.17–1.22)
MONOCYTES NFR BLD AUTO: 9 % (ref 4–12)
NEUTROPHILS # BLD AUTO: 5.06 THOUSANDS/ÂΜL (ref 1.85–7.62)
NEUTS SEG NFR BLD AUTO: 62 % (ref 43–75)
NRBC BLD AUTO-RTO: 0 /100 WBCS
PLATELET # BLD AUTO: 254 THOUSANDS/UL (ref 149–390)
PMV BLD AUTO: 10.9 FL (ref 8.9–12.7)
POTASSIUM SERPL-SCNC: 4.5 MMOL/L (ref 3.5–5.3)
PROT SERPL-MCNC: 6.7 G/DL (ref 6.4–8.4)
PSA SERPL-MCNC: 0.98 NG/ML (ref 0–4)
RBC # BLD AUTO: 5.1 MILLION/UL (ref 3.88–5.62)
SODIUM SERPL-SCNC: 141 MMOL/L (ref 135–147)
TRIGL SERPL-MCNC: 67 MG/DL
TSH SERPL DL<=0.05 MIU/L-ACNC: 1.06 UIU/ML (ref 0.45–4.5)
WBC # BLD AUTO: 8.05 THOUSAND/UL (ref 4.31–10.16)

## 2023-11-20 PROCEDURE — 83036 HEMOGLOBIN GLYCOSYLATED A1C: CPT

## 2023-11-20 PROCEDURE — 85025 COMPLETE CBC W/AUTO DIFF WBC: CPT

## 2023-11-20 PROCEDURE — G0103 PSA SCREENING: HCPCS

## 2023-11-20 PROCEDURE — 82043 UR ALBUMIN QUANTITATIVE: CPT

## 2023-11-20 PROCEDURE — 84443 ASSAY THYROID STIM HORMONE: CPT

## 2023-11-20 PROCEDURE — 36415 COLL VENOUS BLD VENIPUNCTURE: CPT

## 2023-11-20 PROCEDURE — 82570 ASSAY OF URINE CREATININE: CPT

## 2023-11-20 PROCEDURE — 80053 COMPREHEN METABOLIC PANEL: CPT

## 2023-11-20 PROCEDURE — 80061 LIPID PANEL: CPT

## 2023-11-21 ENCOUNTER — OFFICE VISIT (OUTPATIENT)
Dept: FAMILY MEDICINE CLINIC | Facility: CLINIC | Age: 63
End: 2023-11-21
Payer: COMMERCIAL

## 2023-11-21 ENCOUNTER — TELEPHONE (OUTPATIENT)
Dept: FAMILY MEDICINE CLINIC | Facility: CLINIC | Age: 63
End: 2023-11-21

## 2023-11-21 VITALS
SYSTOLIC BLOOD PRESSURE: 138 MMHG | RESPIRATION RATE: 16 BRPM | OXYGEN SATURATION: 98 % | TEMPERATURE: 97.9 F | HEART RATE: 95 BPM | BODY MASS INDEX: 25.21 KG/M2 | DIASTOLIC BLOOD PRESSURE: 80 MMHG | HEIGHT: 74 IN | WEIGHT: 196.4 LBS

## 2023-11-21 DIAGNOSIS — I10 ESSENTIAL HYPERTENSION: ICD-10-CM

## 2023-11-21 DIAGNOSIS — R80.9 TYPE 2 DIABETES MELLITUS WITH MICROALBUMINURIA, WITH LONG-TERM CURRENT USE OF INSULIN (HCC): Primary | ICD-10-CM

## 2023-11-21 DIAGNOSIS — K21.9 GASTROESOPHAGEAL REFLUX DISEASE WITHOUT ESOPHAGITIS: ICD-10-CM

## 2023-11-21 DIAGNOSIS — Z79.4 TYPE 2 DIABETES MELLITUS WITH MICROALBUMINURIA, WITH LONG-TERM CURRENT USE OF INSULIN (HCC): Primary | ICD-10-CM

## 2023-11-21 DIAGNOSIS — E11.9 TYPE 2 DIABETES MELLITUS WITHOUT COMPLICATION, WITH LONG-TERM CURRENT USE OF INSULIN (HCC): ICD-10-CM

## 2023-11-21 DIAGNOSIS — E11.29 TYPE 2 DIABETES MELLITUS WITH MICROALBUMINURIA, WITH LONG-TERM CURRENT USE OF INSULIN (HCC): Primary | ICD-10-CM

## 2023-11-21 DIAGNOSIS — E78.49 OTHER HYPERLIPIDEMIA: ICD-10-CM

## 2023-11-21 DIAGNOSIS — I69.354 HEMIPARESIS AFFECTING LEFT SIDE AS LATE EFFECT OF CEREBROVASCULAR ACCIDENT (CVA) (HCC): ICD-10-CM

## 2023-11-21 DIAGNOSIS — R11.0 NAUSEA: ICD-10-CM

## 2023-11-21 DIAGNOSIS — N40.0 BENIGN PROSTATIC HYPERPLASIA, UNSPECIFIED WHETHER LOWER URINARY TRACT SYMPTOMS PRESENT: ICD-10-CM

## 2023-11-21 DIAGNOSIS — Z23 ENCOUNTER FOR IMMUNIZATION: ICD-10-CM

## 2023-11-21 DIAGNOSIS — F41.9 ANXIETY: ICD-10-CM

## 2023-11-21 DIAGNOSIS — Z79.4 TYPE 2 DIABETES MELLITUS WITHOUT COMPLICATION, WITH LONG-TERM CURRENT USE OF INSULIN (HCC): ICD-10-CM

## 2023-11-21 PROCEDURE — 90678 RSV VACC PREF BIVALENT IM: CPT

## 2023-11-21 PROCEDURE — 99214 OFFICE O/P EST MOD 30 MIN: CPT | Performed by: FAMILY MEDICINE

## 2023-11-21 PROCEDURE — 90471 IMMUNIZATION ADMIN: CPT

## 2023-11-21 RX ORDER — PANTOPRAZOLE SODIUM 40 MG/1
40 TABLET, DELAYED RELEASE ORAL
Qty: 30 TABLET | Refills: 3 | Status: SHIPPED | OUTPATIENT
Start: 2023-11-21

## 2023-11-21 RX ORDER — INSULIN GLARGINE 300 U/ML
34 INJECTION, SOLUTION SUBCUTANEOUS
Qty: 6 ML | Refills: 3 | Status: SHIPPED | OUTPATIENT
Start: 2023-11-21

## 2023-11-21 RX ORDER — TAMSULOSIN HYDROCHLORIDE 0.4 MG/1
0.4 CAPSULE ORAL
Qty: 90 CAPSULE | Refills: 1 | Status: SHIPPED | OUTPATIENT
Start: 2023-11-21

## 2023-11-21 RX ORDER — ALPRAZOLAM 0.5 MG/1
0.5 TABLET ORAL 2 TIMES DAILY PRN
Qty: 45 TABLET | Refills: 2 | Status: SHIPPED | OUTPATIENT
Start: 2023-11-21

## 2023-11-21 NOTE — TELEPHONE ENCOUNTER
Yeah, I'm calling from my , Oziel Hollis. We were just in today. We saw Doctor Golden Young and he increased the Xanax to 1 1/2 tablets a day, but that's not what it says on my summary and that's not what the pharmacy is saying. The number is [phone] on Georgiana Madrigal's wife on the pharmacy is back drug. Please give me a call back. Thank you.

## 2023-11-21 NOTE — PROGRESS NOTES
Name: Chico Banda      : [birth year]      MRN: [id number]  Encounter Provider: Deborah Galvan MD  Encounter Date: 2023   Encounter department: [facility]     1. Type 2 diabetes mellitus with microalbuminuria, with long-term current use of insulin [facility])  Assessment & Plan:    Lab Results   Component Value Date    HGBA1C 7.5 (H) 2023   Improving on Toujeo. I am going to increase Toujeo to 34 units nightly. Continue taking medication as prescribed. Continue lifestyle modifications. Will continue to monitor. Orders:  -     Albumin / creatinine urine ratio; Future; Expected date: 2023  -     Comprehensive metabolic panel; Future; Expected date: 2023  -     Hemoglobin A1C; Future; Expected date: 2023  -     CBC and differential; Future; Expected date: 2023  -     Lipid Panel with Direct LDL reflex; Future; Expected date: 2023  -     TSH, 3rd generation with Free T4 reflex; Future; Expected date: 2023    2. Essential hypertension  Assessment & Plan:  /80. Well controlled on amlodipine and lisinopril. Continue medications as prescribed. Will continue to monitor. 3. Other hyperlipidemia  Assessment & Plan:  Well controlled on atorvastatin and fenofibrate. Will continue to monitor. 4. Anxiety  Assessment & Plan:  Patient reports multiple anxiety attacks recently. Patient reports taking 1.5 tabs of Xanax 0.5 mg which helped but did not relieve the anxiety. Orders:  -     ALPRAZolam (XANAX) 0.5 mg tablet; Take 1 tablet (0.5 mg total) by mouth 2 (two) times a day as needed for anxiety    5. Type 2 diabetes mellitus without complication, with long-term current use of insulin ([facility])  -     insulin glargine (Toujeo Max SoloStar) 300 units/mL CONCENTRATED U-300 injection pen (2-unit dial); Inject 34 Units under the skin daily at bedtime    6.  Gastroesophageal reflux disease without esophagitis  - pantoprazole (PROTONIX) 40 mg tablet; Take 1 tablet (40 mg total) by mouth daily before breakfast    7. Benign prostatic hyperplasia, unspecified whether lower urinary tract symptoms present  Assessment & Plan:  Symptoms are worsening. I am going to place him back on Flomax 0.4 mg at bedtime. Discussed the possible side effect. Continue to monitor. Orders:  -     tamsulosin (FLOMAX) 0.4 mg; Take 1 capsule (0.4 mg total) by mouth daily with dinner    8. Hemiparesis affecting left side as late effect of cerebrovascular accident (CVA) [facility])  Assessment & Plan:  Symptoms improving. Continue with physical therapy. 9. Nausea  Assessment & Plan:  He was given prescription for pantoprazole and he is to continue on Zofran as needed. 10. BMI 25.0-25.9,adult    11. Encounter for immunization  -     Respiratory Syncytial Virus (RSV) vaccine (recombinant) Zeb Nayak           Subjective      EM is a 59 yo male with a PMH of type 2 DM, CVA, anxiety, presenting to the office for a 6 week follow up well visit. Blood work on 11/20/23 shows improved A1c of 7.5 and fasting glucose of 126. Blood work was reviewed with patient. Patient has been taking medications as prescribed and is tolerating them well. Patient reports that xanax is no longer helping his anxiety. Patient reports taking 1.5 tabs a day with a little relief but not complete. Patient also reports feeling fatigued since the stroke, stating that he gets tired within 2 hrs and is in bed the rest of the day. Diabetes  Hypoglycemia symptoms include nervousness/anxiousness. Pertinent negatives for hypoglycemia include no dizziness or headaches. Associated symptoms include fatigue. Pertinent negatives for diabetes include no chest pain and no weakness. Cerebrovascular Accident  Associated symptoms include fatigue and nausea.  Pertinent negatives include no abdominal pain, chest pain, chills, congestion, coughing, fever, headaches, numbness, rash, vomiting or weakness. Anxiety  Symptoms include nausea and nervous/anxious behavior. Patient reports no chest pain, dizziness or palpitations. Fatigue  Associated symptoms include fatigue and nausea. Pertinent negatives include no abdominal pain, chest pain, chills, congestion, coughing, fever, headaches, numbness, rash, vomiting or weakness. Review of Systems   Constitutional:  Positive for fatigue. Negative for chills and fever. HENT:  Negative for congestion and trouble swallowing. Eyes:  Negative for visual disturbance. Respiratory:  Negative for cough, chest tightness and wheezing. Cardiovascular:  Negative for chest pain, palpitations and leg swelling. Gastrointestinal:  Positive for nausea. Negative for abdominal pain, blood in stool, diarrhea and vomiting. Endocrine: Negative for cold intolerance and heat intolerance. Genitourinary:  Negative for difficulty urinating and dysuria. Musculoskeletal:  Negative for gait problem. Skin:  Negative for rash. Neurological:  Negative for dizziness, syncope, weakness, light-headedness, numbness and headaches. Hematological:  Negative for adenopathy. Psychiatric/Behavioral:  Negative for behavioral problems. The patient is nervous/anxious.         Current Outpatient Medications on File Prior to Visit   Medication Sig   • Accu-Chek Guide test strip    • acetaminophen (TYLENOL) 500 mg tablet Take 500 mg by mouth every 4 (four) hours as needed   • Alcohol Swabs PADS As needed   • amLODIPine (NORVASC) 10 mg tablet    • aspirin (ECOTRIN LOW STRENGTH) 81 mg EC tablet Take 81 mg by mouth daily   • atorvastatin (LIPITOR) 40 mg tablet Take 40 mg by mouth daily   • Blood Glucose Monitoring Suppl (Accu-Chek Guide) w/Device KIT    • clopidogrel (PLAVIX) 75 mg tablet Take 1 tablet (75 mg total) by mouth daily   • fenofibrate micronized (LOFIBRA) 134 MG capsule    • gabapentin (NEURONTIN) 100 mg capsule    • GNP Alcohol Swabs 70 % PADS    • lisinopril (ZESTRIL) 5 mg tablet Take 5 mg by mouth daily   • ondansetron (ZOFRAN) 4 mg tablet Take 1 tablet (4 mg total) by mouth every 8 (eight) hours as needed for nausea or vomiting   • [DISCONTINUED] ALPRAZolam (XANAX) 0.5 mg tablet Take 1 tablet (0.5 mg total) by mouth daily as needed for anxiety   • [DISCONTINUED] insulin glargine (Toujeo Max SoloStar) 300 units/mL CONCENTRATED U-300 injection pen (2-unit dial) Inject 30 Units under the skin daily at bedtime   • oxyCODONE (ROXICODONE) 5 immediate release tablet  (Patient not taking: Reported on 9/26/2023)   • triamcinolone (KENALOG) 0.1 % cream Apply topically 2 (two) times a day (Patient not taking: Reported on 9/12/2023)   • [DISCONTINUED] tamsulosin (FLOMAX) 0.4 mg Take 1 capsule (0.4 mg total) by mouth daily with dinner (Patient not taking: Reported on 9/12/2023)       Objective     /80 (BP Location: Left arm, Patient Position: Sitting, Cuff Size: Large)   Pulse 95   Temp 97.9 °F (36.6 °C) (Tympanic)   Resp 16   Ht 6' 2" (1.88 m)   Wt 89.1 kg (196 lb 6.4 oz)   SpO2 98%   BMI 25.22 kg/m²     Physical Exam  Vitals and nursing note reviewed. Constitutional:       Appearance: Normal appearance. He is well-developed. HENT:      Head: Normocephalic and atraumatic. Right Ear: Tympanic membrane normal.      Left Ear: Tympanic membrane normal.      Mouth/Throat:      Mouth: Mucous membranes are moist.   Eyes:      Pupils: Pupils are equal, round, and reactive to light. Cardiovascular:      Rate and Rhythm: Normal rate and regular rhythm. Pulses: Normal pulses. Heart sounds: Normal heart sounds. Pulmonary:      Effort: Pulmonary effort is normal.      Breath sounds: Normal breath sounds. Abdominal:      General: Bowel sounds are normal.      Palpations: Abdomen is soft. Musculoskeletal:      Cervical back: Normal range of motion and neck supple. Lymphadenopathy:      Cervical: No cervical adenopathy. Skin:     General: Skin is warm. Capillary Refill: Capillary refill takes less than 2 seconds. Findings: No rash. Neurological:      Mental Status: He is alert and oriented to person, place, and time. Psychiatric:         Mood and Affect: Mood normal.         Behavior: Behavior normal.       Asuncion Jurado MD  BMI Counseling: Body mass index is 25.22 kg/m². The BMI is above normal. Nutrition recommendations include reducing portion sizes, decreasing overall calorie intake, and 3-5 servings of fruits/vegetables daily.

## 2023-11-21 NOTE — ASSESSMENT & PLAN NOTE
Lab Results   Component Value Date    HGBA1C 7.5 (H) 11/20/2023   Improving on Toujeo. I am going to increase Toujeo to 34 units nightly. Continue taking medication as prescribed. Continue lifestyle modifications. Will continue to monitor.

## 2023-11-21 NOTE — ASSESSMENT & PLAN NOTE
/80. Well controlled on amlodipine and lisinopril. Continue medications as prescribed. Will continue to monitor.

## 2023-11-21 NOTE — ASSESSMENT & PLAN NOTE
Symptoms are worsening. I am going to place him back on Flomax 0.4 mg at bedtime. Discussed the possible side effect. Continue to monitor.

## 2023-11-21 NOTE — ASSESSMENT & PLAN NOTE
Patient reports multiple anxiety attacks recently. Patient reports taking 1.5 tabs of Xanax 0.5 mg which helped but did not relieve the anxiety.

## 2023-11-25 PROBLEM — H61.23 BILATERAL IMPACTED CERUMEN: Status: RESOLVED | Noted: 2023-09-26 | Resolved: 2023-11-25

## 2023-11-28 DIAGNOSIS — I63.89 CEREBROVASCULAR ACCIDENT (CVA) DUE TO OTHER MECHANISM (HCC): ICD-10-CM

## 2023-11-28 RX ORDER — AMLODIPINE BESYLATE 10 MG/1
10 TABLET ORAL DAILY
Qty: 30 TABLET | Refills: 2 | Status: SHIPPED | OUTPATIENT
Start: 2023-11-28

## 2023-11-28 RX ORDER — FENOFIBRATE 134 MG/1
134 CAPSULE ORAL
Qty: 30 CAPSULE | Refills: 2 | Status: SHIPPED | OUTPATIENT
Start: 2023-11-28

## 2023-11-28 RX ORDER — CLOPIDOGREL BISULFATE 75 MG/1
75 TABLET ORAL DAILY
Qty: 30 TABLET | Refills: 0 | Status: SHIPPED | OUTPATIENT
Start: 2023-11-28

## 2023-11-28 RX ORDER — ATORVASTATIN CALCIUM 40 MG/1
40 TABLET, FILM COATED ORAL DAILY
Qty: 30 TABLET | Refills: 2 | Status: SHIPPED | OUTPATIENT
Start: 2023-11-28 | End: 2024-02-26

## 2023-11-28 RX ORDER — LISINOPRIL 5 MG/1
5 TABLET ORAL DAILY
Qty: 30 TABLET | Refills: 2 | Status: SHIPPED | OUTPATIENT
Start: 2023-11-28 | End: 2024-02-26

## 2023-11-30 ENCOUNTER — TELEPHONE (OUTPATIENT)
Dept: FAMILY MEDICINE CLINIC | Facility: CLINIC | Age: 63
End: 2023-11-30

## 2023-11-30 DIAGNOSIS — R80.9 TYPE 2 DIABETES MELLITUS WITH MICROALBUMINURIA, WITH LONG-TERM CURRENT USE OF INSULIN (HCC): Primary | ICD-10-CM

## 2023-11-30 DIAGNOSIS — Z79.4 TYPE 2 DIABETES MELLITUS WITH MICROALBUMINURIA, WITH LONG-TERM CURRENT USE OF INSULIN (HCC): Primary | ICD-10-CM

## 2023-11-30 DIAGNOSIS — E11.29 TYPE 2 DIABETES MELLITUS WITH MICROALBUMINURIA, WITH LONG-TERM CURRENT USE OF INSULIN (HCC): Primary | ICD-10-CM

## 2023-11-30 RX ORDER — PEN NEEDLE, DIABETIC 33 GX5/32"
NEEDLE, DISPOSABLE MISCELLANEOUS
Qty: 100 EACH | Refills: 1 | Status: SHIPPED | OUTPATIENT
Start: 2023-11-30

## 2023-12-26 DIAGNOSIS — I63.89 CEREBROVASCULAR ACCIDENT (CVA) DUE TO OTHER MECHANISM (HCC): ICD-10-CM

## 2023-12-26 RX ORDER — CLOPIDOGREL BISULFATE 75 MG/1
75 TABLET ORAL DAILY
Qty: 30 TABLET | Refills: 0 | Status: SHIPPED | OUTPATIENT
Start: 2023-12-26

## 2024-01-02 DIAGNOSIS — E11.9 TYPE 2 DIABETES MELLITUS WITHOUT COMPLICATION, WITH LONG-TERM CURRENT USE OF INSULIN (HCC): ICD-10-CM

## 2024-01-02 DIAGNOSIS — Z79.4 TYPE 2 DIABETES MELLITUS WITHOUT COMPLICATION, WITH LONG-TERM CURRENT USE OF INSULIN (HCC): ICD-10-CM

## 2024-01-02 RX ORDER — INSULIN GLARGINE 300 U/ML
34 INJECTION, SOLUTION SUBCUTANEOUS
Qty: 6 ML | Refills: 3 | Status: SHIPPED | OUTPATIENT
Start: 2024-01-02 | End: 2024-01-03 | Stop reason: SDUPTHER

## 2024-01-03 DIAGNOSIS — E11.9 TYPE 2 DIABETES MELLITUS WITHOUT COMPLICATION, WITH LONG-TERM CURRENT USE OF INSULIN (HCC): ICD-10-CM

## 2024-01-03 DIAGNOSIS — Z79.4 TYPE 2 DIABETES MELLITUS WITHOUT COMPLICATION, WITH LONG-TERM CURRENT USE OF INSULIN (HCC): ICD-10-CM

## 2024-01-03 RX ORDER — INSULIN GLARGINE 300 U/ML
34 INJECTION, SOLUTION SUBCUTANEOUS
Qty: 6 ML | Refills: 3 | Status: SHIPPED | OUTPATIENT
Start: 2024-01-03

## 2024-01-03 NOTE — TELEPHONE ENCOUNTER
Pt called [facility] and they told pt susan was sent for 30 units at bed time and we did send the right directions of 34 units at bed time. I resent the rx and pt is aware.

## 2024-01-08 DIAGNOSIS — N40.0 BENIGN PROSTATIC HYPERPLASIA, UNSPECIFIED WHETHER LOWER URINARY TRACT SYMPTOMS PRESENT: ICD-10-CM

## 2024-01-08 RX ORDER — TAMSULOSIN HYDROCHLORIDE 0.4 MG/1
0.4 CAPSULE ORAL
Qty: 90 CAPSULE | Refills: 0 | OUTPATIENT
Start: 2024-01-08

## 2024-01-22 DIAGNOSIS — I63.89 CEREBROVASCULAR ACCIDENT (CVA) DUE TO OTHER MECHANISM (HCC): ICD-10-CM

## 2024-01-22 NOTE — TELEPHONE ENCOUNTER
Reason for call:   [x] Refill   [] Prior Auth  [] Other:     Office:   [x] PCP/Provider - Dr Herring  [] Specialty/Provider -     Medication: clopidogrel    Dose/Frequency: 75 mg daily    Quantity: 90D w refill    Pharmacy: [facility]    Does the patient have enough for 3 days?   [x] Yes   [] No - Send as HP to POD     Cheek Interpolation Flap Text: A decision was made to reconstruct the defect utilizing an interpolation axial flap and a staged reconstruction.  A telfa template was made of the defect.  This telfa template was then used to outline the Cheek Interpolation flap.  The donor area for the pedicle flap was then injected with anesthesia.  The flap was excised through the skin and subcutaneous tissue down to the layer of the underlying musculature.  The interpolation flap was carefully excised within this deep plane to maintain its blood supply.  The edges of the donor site were undermined.   The donor site was closed in a primary fashion.  The pedicle was then rotated into position and sutured.  Once the tube was sutured into place, adequate blood supply was confirmed with blanching and refill.  The pedicle was then wrapped with xeroform gauze and dressed appropriately with a telfa and gauze bandage to ensure continued blood supply and protect the attached pedicle.

## 2024-01-23 RX ORDER — CLOPIDOGREL BISULFATE 75 MG/1
75 TABLET ORAL DAILY
Qty: 90 TABLET | Refills: 1 | Status: SHIPPED | OUTPATIENT
Start: 2024-01-23

## 2024-01-25 DIAGNOSIS — I63.89 CEREBROVASCULAR ACCIDENT (CVA) DUE TO OTHER MECHANISM (HCC): ICD-10-CM

## 2024-01-25 RX ORDER — FENOFIBRATE 134 MG/1
134 CAPSULE ORAL
Qty: 30 CAPSULE | Refills: 5 | Status: SHIPPED | OUTPATIENT
Start: 2024-01-25

## 2024-01-25 RX ORDER — ATORVASTATIN CALCIUM 40 MG/1
40 TABLET, FILM COATED ORAL DAILY
Qty: 30 TABLET | Refills: 5 | Status: SHIPPED | OUTPATIENT
Start: 2024-01-25 | End: 2024-07-23

## 2024-02-09 ENCOUNTER — APPOINTMENT (OUTPATIENT)
Dept: LAB | Facility: IMAGING CENTER | Age: 64
End: 2024-02-09
Payer: COMMERCIAL

## 2024-02-09 DIAGNOSIS — R80.9 TYPE 2 DIABETES MELLITUS WITH MICROALBUMINURIA, WITH LONG-TERM CURRENT USE OF INSULIN (HCC): ICD-10-CM

## 2024-02-09 DIAGNOSIS — Z79.4 TYPE 2 DIABETES MELLITUS WITH MICROALBUMINURIA, WITH LONG-TERM CURRENT USE OF INSULIN (HCC): ICD-10-CM

## 2024-02-09 DIAGNOSIS — E11.29 TYPE 2 DIABETES MELLITUS WITH MICROALBUMINURIA, WITH LONG-TERM CURRENT USE OF INSULIN (HCC): ICD-10-CM

## 2024-02-09 DIAGNOSIS — E11.65 TYPE 2 DIABETES MELLITUS WITH HYPERGLYCEMIA, WITH LONG-TERM CURRENT USE OF INSULIN (HCC): ICD-10-CM

## 2024-02-09 DIAGNOSIS — Z79.4 TYPE 2 DIABETES MELLITUS WITH HYPERGLYCEMIA, WITH LONG-TERM CURRENT USE OF INSULIN (HCC): ICD-10-CM

## 2024-02-09 DIAGNOSIS — Z79.4 ENCOUNTER FOR LONG-TERM (CURRENT) USE OF INSULIN (HCC): ICD-10-CM

## 2024-02-09 LAB
ALBUMIN SERPL BCP-MCNC: 4.3 G/DL (ref 3.5–5)
ALP SERPL-CCNC: 50 U/L (ref 34–104)
ALT SERPL W P-5'-P-CCNC: 7 U/L (ref 7–52)
ANION GAP SERPL CALCULATED.3IONS-SCNC: 9 MMOL/L
AST SERPL W P-5'-P-CCNC: 8 U/L (ref 13–39)
BASOPHILS # BLD AUTO: 0.07 THOUSANDS/ÂΜL (ref 0–0.1)
BASOPHILS NFR BLD AUTO: 1 % (ref 0–1)
BILIRUB DIRECT SERPL-MCNC: 0.12 MG/DL (ref 0–0.2)
BILIRUB SERPL-MCNC: 0.6 MG/DL (ref 0.2–1)
BUN SERPL-MCNC: 15 MG/DL (ref 5–25)
CALCIUM SERPL-MCNC: 9 MG/DL (ref 8.4–10.2)
CHLORIDE SERPL-SCNC: 103 MMOL/L (ref 96–108)
CHOLEST SERPL-MCNC: 127 MG/DL
CO2 SERPL-SCNC: 27 MMOL/L (ref 21–32)
CREAT SERPL-MCNC: 0.96 MG/DL (ref 0.6–1.3)
CREAT UR-MCNC: 142 MG/DL
EOSINOPHIL # BLD AUTO: 0.2 THOUSAND/ÂΜL (ref 0–0.61)
EOSINOPHIL NFR BLD AUTO: 3 % (ref 0–6)
ERYTHROCYTE [DISTWIDTH] IN BLOOD BY AUTOMATED COUNT: 13.6 % (ref 11.6–15.1)
GFR SERPL CREATININE-BSD FRML MDRD: 83 ML/MIN/1.73SQ M
GLUCOSE P FAST SERPL-MCNC: 116 MG/DL (ref 65–99)
HCT VFR BLD AUTO: 46.8 % (ref 36.5–49.3)
HDLC SERPL-MCNC: 48 MG/DL
HGB BLD-MCNC: 15.2 G/DL (ref 12–17)
IMM GRANULOCYTES # BLD AUTO: 0.04 THOUSAND/UL (ref 0–0.2)
IMM GRANULOCYTES NFR BLD AUTO: 1 % (ref 0–2)
LDLC SERPL CALC-MCNC: 62 MG/DL (ref 0–100)
LYMPHOCYTES # BLD AUTO: 1.96 THOUSANDS/ÂΜL (ref 0.6–4.47)
LYMPHOCYTES NFR BLD AUTO: 27 % (ref 14–44)
MCH RBC QN AUTO: 28.7 PG (ref 26.8–34.3)
MCHC RBC AUTO-ENTMCNC: 32.5 G/DL (ref 31.4–37.4)
MCV RBC AUTO: 88 FL (ref 82–98)
MICROALBUMIN UR-MCNC: 12.6 MG/L
MICROALBUMIN/CREAT 24H UR: 9 MG/G CREATININE (ref 0–30)
MONOCYTES # BLD AUTO: 0.48 THOUSAND/ÂΜL (ref 0.17–1.22)
MONOCYTES NFR BLD AUTO: 7 % (ref 4–12)
NEUTROPHILS # BLD AUTO: 4.49 THOUSANDS/ÂΜL (ref 1.85–7.62)
NEUTS SEG NFR BLD AUTO: 61 % (ref 43–75)
NRBC BLD AUTO-RTO: 0 /100 WBCS
PLATELET # BLD AUTO: 293 THOUSANDS/UL (ref 149–390)
PMV BLD AUTO: 10.8 FL (ref 8.9–12.7)
POTASSIUM SERPL-SCNC: 4.1 MMOL/L (ref 3.5–5.3)
PROT SERPL-MCNC: 6.7 G/DL (ref 6.4–8.4)
RBC # BLD AUTO: 5.3 MILLION/UL (ref 3.88–5.62)
SODIUM SERPL-SCNC: 139 MMOL/L (ref 135–147)
TRIGL SERPL-MCNC: 83 MG/DL
TSH SERPL DL<=0.05 MIU/L-ACNC: 0.7 UIU/ML (ref 0.45–4.5)
WBC # BLD AUTO: 7.24 THOUSAND/UL (ref 4.31–10.16)

## 2024-02-09 PROCEDURE — 82248 BILIRUBIN DIRECT: CPT

## 2024-02-09 PROCEDURE — 83036 HEMOGLOBIN GLYCOSYLATED A1C: CPT

## 2024-02-09 PROCEDURE — 85025 COMPLETE CBC W/AUTO DIFF WBC: CPT

## 2024-02-09 PROCEDURE — 80061 LIPID PANEL: CPT

## 2024-02-09 PROCEDURE — 84443 ASSAY THYROID STIM HORMONE: CPT

## 2024-02-09 PROCEDURE — 36415 COLL VENOUS BLD VENIPUNCTURE: CPT

## 2024-02-09 PROCEDURE — 82043 UR ALBUMIN QUANTITATIVE: CPT

## 2024-02-09 PROCEDURE — 80053 COMPREHEN METABOLIC PANEL: CPT

## 2024-02-09 PROCEDURE — 82570 ASSAY OF URINE CREATININE: CPT

## 2024-02-10 LAB
EST. AVERAGE GLUCOSE BLD GHB EST-MCNC: 146 MG/DL
HBA1C MFR BLD: 6.7 %

## 2024-02-21 ENCOUNTER — OFFICE VISIT (OUTPATIENT)
Dept: FAMILY MEDICINE CLINIC | Facility: CLINIC | Age: 64
End: 2024-02-21
Payer: COMMERCIAL

## 2024-02-21 VITALS
SYSTOLIC BLOOD PRESSURE: 138 MMHG | HEART RATE: 90 BPM | DIASTOLIC BLOOD PRESSURE: 80 MMHG | OXYGEN SATURATION: 98 % | TEMPERATURE: 97.5 F | BODY MASS INDEX: 24.38 KG/M2 | WEIGHT: 190 LBS | HEIGHT: 74 IN | RESPIRATION RATE: 16 BRPM

## 2024-02-21 DIAGNOSIS — F41.9 ANXIETY: ICD-10-CM

## 2024-02-21 DIAGNOSIS — I69.354 HEMIPARESIS AFFECTING LEFT SIDE AS LATE EFFECT OF CEREBROVASCULAR ACCIDENT (CVA) (HCC): ICD-10-CM

## 2024-02-21 DIAGNOSIS — I95.1 ORTHOSTATIC HYPOTENSION: Primary | ICD-10-CM

## 2024-02-21 DIAGNOSIS — R80.9 TYPE 2 DIABETES MELLITUS WITH MICROALBUMINURIA, WITH LONG-TERM CURRENT USE OF INSULIN (HCC): ICD-10-CM

## 2024-02-21 DIAGNOSIS — Z79.4 TYPE 2 DIABETES MELLITUS WITH MICROALBUMINURIA, WITH LONG-TERM CURRENT USE OF INSULIN (HCC): ICD-10-CM

## 2024-02-21 DIAGNOSIS — I63.89 CEREBROVASCULAR ACCIDENT (CVA) DUE TO OTHER MECHANISM (HCC): ICD-10-CM

## 2024-02-21 DIAGNOSIS — R11.0 NAUSEA: ICD-10-CM

## 2024-02-21 DIAGNOSIS — E11.29 TYPE 2 DIABETES MELLITUS WITH MICROALBUMINURIA, WITH LONG-TERM CURRENT USE OF INSULIN (HCC): ICD-10-CM

## 2024-02-21 DIAGNOSIS — K21.9 GASTROESOPHAGEAL REFLUX DISEASE WITHOUT ESOPHAGITIS: ICD-10-CM

## 2024-02-21 DIAGNOSIS — I10 ESSENTIAL HYPERTENSION: ICD-10-CM

## 2024-02-21 DIAGNOSIS — N40.0 BENIGN PROSTATIC HYPERPLASIA, UNSPECIFIED WHETHER LOWER URINARY TRACT SYMPTOMS PRESENT: ICD-10-CM

## 2024-02-21 PROBLEM — R06.09 DYSPNEA ON MINIMAL EXERTION: Status: ACTIVE | Noted: 2024-02-14

## 2024-02-21 PROBLEM — Z00.00 HEALTHCARE MAINTENANCE: Status: RESOLVED | Noted: 2019-10-14 | Resolved: 2024-02-21

## 2024-02-21 PROBLEM — Z86.73 HISTORY OF STROKE: Status: ACTIVE | Noted: 2024-02-14

## 2024-02-21 PROCEDURE — 99214 OFFICE O/P EST MOD 30 MIN: CPT | Performed by: FAMILY MEDICINE

## 2024-02-21 RX ORDER — PANTOPRAZOLE SODIUM 40 MG/1
40 TABLET, DELAYED RELEASE ORAL
Qty: 30 TABLET | Refills: 3 | Status: SHIPPED | OUTPATIENT
Start: 2024-02-21

## 2024-02-21 RX ORDER — LISINOPRIL 5 MG/1
5 TABLET ORAL DAILY
Qty: 30 TABLET | Refills: 2 | Status: SHIPPED | OUTPATIENT
Start: 2024-02-21 | End: 2024-05-21

## 2024-02-21 RX ORDER — AMLODIPINE BESYLATE 10 MG/1
10 TABLET ORAL DAILY
Qty: 30 TABLET | Refills: 2 | Status: SHIPPED | OUTPATIENT
Start: 2024-02-21

## 2024-02-21 RX ORDER — ONDANSETRON 4 MG/1
4 TABLET, FILM COATED ORAL EVERY 8 HOURS PRN
Qty: 20 TABLET | Refills: 0 | Status: SHIPPED | OUTPATIENT
Start: 2024-02-21

## 2024-02-21 RX ORDER — ATORVASTATIN CALCIUM 40 MG/1
40 TABLET, FILM COATED ORAL DAILY
Qty: 30 TABLET | Refills: 5 | Status: SHIPPED | OUTPATIENT
Start: 2024-02-21 | End: 2024-08-19

## 2024-02-21 RX ORDER — FENOFIBRATE 134 MG/1
134 CAPSULE ORAL
Qty: 30 CAPSULE | Refills: 5 | Status: SHIPPED | OUTPATIENT
Start: 2024-02-21

## 2024-02-21 RX ORDER — ALPRAZOLAM 0.5 MG/1
0.5 TABLET ORAL 2 TIMES DAILY PRN
Qty: 45 TABLET | Refills: 2 | Status: SHIPPED | OUTPATIENT
Start: 2024-02-21

## 2024-02-21 RX ORDER — PEN NEEDLE, DIABETIC 33 GX5/32"
NEEDLE, DISPOSABLE MISCELLANEOUS
Qty: 100 EACH | Refills: 1 | Status: SHIPPED | OUTPATIENT
Start: 2024-02-21

## 2024-02-21 NOTE — PROGRESS NOTES
Name: German Louie      : 1960      MRN: [id number]  Encounter Provider: Rory Herring MD  Encounter Date: 2024   Encounter department: [facility] [address] [facility]    Assessment & Plan     1. Orthostatic hypotension  Assessment & Plan:  Discussed with patient to cut down on his Norvasc to 5 mg daily for now.  Hold off Flomax.  I am going to check his CBC, CMP and urine UA and C&S.  He is to follow-up with cardiologist.  Was told to go to emergency room if symptoms get any worse.    Orders:  -     CBC and differential; Future  -     Comprehensive metabolic panel; Future  -     UA w Reflex to Microscopic w Reflex to Culture; Future    2. Benign prostatic hyperplasia, unspecified whether lower urinary tract symptoms present  Assessment & Plan:  Hold the Flomax and I am going to check UA and C&S.      3. Gastroesophageal reflux disease without esophagitis  -     pantoprazole (PROTONIX) 40 mg tablet; Take 1 tablet (40 mg total) by mouth daily before breakfast    4. Nausea  -     ondansetron (ZOFRAN) 4 mg tablet; Take 1 tablet (4 mg total) by mouth every 8 (eight) hours as needed for nausea or vomiting    5. Cerebrovascular accident (CVA) due to other mechanism ([facility])  Assessment & Plan:  Continue same medications for now.  Continue to follow-up with neurologist.    Orders:  -     lisinopril (ZESTRIL) 5 mg tablet; Take 1 tablet (5 mg total) by mouth daily  -     fenofibrate micronized (LOFIBRA) 134 MG capsule; Take 1 capsule (134 mg total) by mouth daily with breakfast  -     atorvastatin (LIPITOR) 40 mg tablet; Take 1 tablet (40 mg total) by mouth daily  -     amLODIPine (NORVASC) 10 mg tablet; Take 1 tablet (10 mg total) by mouth daily    6. Type 2 diabetes mellitus with microalbuminuria, with long-term current use of insulin ([facility])  Assessment & Plan:  His A1c improved to 6.7.  This is well-controlled.  Continue on same medications and we will continue to monitor.  Lab Results   Component  Value Date    HGBA1C 6.7 (H) 02/09/2024     Orders:  -     Insulin Pen Needle (Pen Needles) 33G X 4 MM MISC; Use daily with Toujeo    7. Anxiety  -     ALPRAZolam (XANAX) 0.5 mg tablet; Take 1 tablet (0.5 mg total) by mouth 2 (two) times a day as needed for anxiety    8. Hemiparesis affecting left side as late effect of cerebrovascular accident (CVA) ([facility])  Assessment & Plan:  Symptoms improving.  Continue with physical therapy.      9. Essential hypertension  Assessment & Plan:  His blood pressure was well-controlled while he was sitting.  His blood pressure dropped to 100/60 standing.             Subjective     Is here today for follow-up multiple medical problems.  He has been taking his medication.  Denies any side effects.  He stated in the last couple weeks he has been feeling more fatigued and tired and short of breath was minimal exertion.  He was seen recently by his neurologist and was referred to see cardiologist.  He denies any chest pain but complains of shortness of breath with minimum exertion with weakness in his lower extremity.  He stated is worse when he gets up from sitting to standing.  He had blood work recently on February 9 that came back stable.  He stated his urine has been darker in the last couple weeks.  Denies any fever or chills.  Denies any dysuria.      Review of Systems   Constitutional:  Positive for fatigue. Negative for chills and fever.   HENT:  Negative for trouble swallowing.    Eyes:  Negative for visual disturbance.   Respiratory:  Positive for shortness of breath. Negative for cough.    Cardiovascular:  Negative for chest pain and palpitations.   Gastrointestinal:  Negative for abdominal pain, blood in stool and vomiting.   Endocrine: Negative for cold intolerance and heat intolerance.   Genitourinary:  Positive for frequency. Negative for difficulty urinating and dysuria.   Musculoskeletal:  Positive for gait problem.   Skin:  Negative for rash.   Neurological:  Positive  for weakness and light-headedness. Negative for syncope and headaches.   Hematological:  Negative for adenopathy.   Psychiatric/Behavioral:  Negative for behavioral problems.        Past Medical History:   Diagnosis Date   • Diabetes mellitus (HCC)    • HLD (hyperlipidemia)    • Hypertension      Past Surgical History:   Procedure Laterality Date   • ANKLE SURGERY Right    • BACK SURGERY      L4-L5   • NECK SURGERY       Family History   Problem Relation Age of Onset   • Diabetes Mother    • Diabetes Father    • Heart disease Maternal Grandfather    • Heart disease Paternal Grandfather      Social History     Socioeconomic History   • Marital status: /Civil Union     Spouse name: None   • Number of children: None   • Years of education: None   • Highest education level: None   Occupational History   • None   Tobacco Use   • Smoking status: Every Day     Current packs/day: 1.00     Types: Cigarettes   • Smokeless tobacco: Never   Vaping Use   • Vaping status: Never Used   Substance and Sexual Activity   • Alcohol use: Yes     Comment: rare   • Drug use: Never   • Sexual activity: Not Currently   Other Topics Concern   • None   Social History Narrative   • None     Social Determinants of Health     Financial Resource Strain: Low Risk  (9/1/2023)    Received from [facility]    Overall Financial Resource Strain (CARDIA)    • Difficulty of Paying Living Expenses: Not very hard   Food Insecurity: No Food Insecurity (9/1/2023)    Received from [facility]    Hunger Vital Sign    • Worried About Running Out of Food in the Last Year: Never true    • Ran Out of Food in the Last Year: Never true   Transportation Needs: No Transportation Needs (9/5/2023)    Received from [facility]    PRAPARE - Transportation    • Lack of Transportation (Medical): No    • Lack of Transportation (Non-Medical): No   Physical Activity: Not on file   Stress: Not on file   Social  Connections: Feeling Socially Integrated (9/5/2023)    Received from [facility]    OASIS : Social Isolation    • How often do you feel lonely or isolated from those around you?: Never   Intimate Partner Violence: Not At Risk (9/1/2023)    Received from [facility]    Humiliation, Afraid, Rape, and Kick questionnaire    • Fear of Current or Ex-Partner: No    • Emotionally Abused: No    • Physically Abused: No    • Sexually Abused: No   Housing Stability: Low Risk  (9/1/2023)    Received from [facility]    Housing Stability Vital Sign    • Unable to Pay for Housing in the Last Year: No    • Number of Places Lived in the Last Year: 1    • Unstable Housing in the Last Year: No     Current Outpatient Medications on File Prior to Visit   Medication Sig   • Accu-Chek Guide test strip    • acetaminophen (TYLENOL) 500 mg tablet Take 500 mg by mouth every 4 (four) hours as needed   • Alcohol Swabs PADS As needed   • aspirin (ECOTRIN LOW STRENGTH) 81 mg EC tablet Take 81 mg by mouth daily   • Blood Glucose Monitoring Suppl (Accu-Chek Guide) w/Device KIT    • GNP Alcohol Swabs 70 % PADS    • insulin glargine (Toujeo Max SoloStar) 300 units/mL CONCENTRATED U-300 injection pen (2-unit dial) Inject 34 Units under the skin daily at bedtime (Patient taking differently: Inject 32 Units under the skin daily at bedtime)   • tamsulosin (FLOMAX) 0.4 mg Take 1 capsule (0.4 mg total) by mouth daily with dinner   • [DISCONTINUED] ALPRAZolam (XANAX) 0.5 mg tablet Take 1 tablet (0.5 mg total) by mouth 2 (two) times a day as needed for anxiety   • [DISCONTINUED] amLODIPine (NORVASC) 10 mg tablet Take 1 tablet (10 mg total) by mouth daily   • [DISCONTINUED] atorvastatin (LIPITOR) 40 mg tablet TAKE 1 TABLET (40 MG TOTAL) BY MOUTH DAILY   • [DISCONTINUED] fenofibrate micronized (LOFIBRA) 134 MG capsule TAKE 1 CAPSULE (134 MG TOTAL) BY MOUTH DAILY WITH BREAKFAST   • [DISCONTINUED] Insulin  "Pen Needle (Pen Needles) 33G X 4 MM MISC Use daily with Toujeo   • [DISCONTINUED] lisinopril (ZESTRIL) 5 mg tablet Take 1 tablet (5 mg total) by mouth daily   • [DISCONTINUED] ondansetron (ZOFRAN) 4 mg tablet Take 1 tablet (4 mg total) by mouth every 8 (eight) hours as needed for nausea or vomiting   • [DISCONTINUED] pantoprazole (PROTONIX) 40 mg tablet Take 1 tablet (40 mg total) by mouth daily before breakfast   • clopidogrel (PLAVIX) 75 mg tablet Take 1 tablet (75 mg total) by mouth daily (Patient not taking: Reported on 2/21/2024)   • gabapentin (NEURONTIN) 100 mg capsule  (Patient not taking: Reported on 2/21/2024)   • oxyCODONE (ROXICODONE) 5 immediate release tablet  (Patient not taking: Reported on 9/26/2023)   • triamcinolone (KENALOG) 0.1 % cream Apply topically 2 (two) times a day (Patient not taking: Reported on 9/12/2023)     Allergies   Allergen Reactions   • Zolpidem Other (See Comments)     severe drowsiness     Immunization History   Administered Date(s) Administered   • COVID-19 MODERNA VACC 0.5 ML IM 04/26/2021, 05/24/2021, 12/27/2021   • INFLUENZA 10/10/2023   • Influenza, injectable, quadrivalent, preservative free 0.5 mL 10/10/2023   • Respiratory Syncytial Virus Vaccine (Recombinant) 11/21/2023   • Tdap 12/01/2020       Objective     /80 (BP Location: Left arm, Patient Position: Sitting, Cuff Size: Large)   Pulse 90   Temp 97.5 °F (36.4 °C) (Tympanic)   Resp 16   Ht 6' 2\" (1.88 m)   Wt 86.2 kg (190 lb)   SpO2 98%   BMI 24.39 kg/m²     Physical Exam  Vitals and nursing note reviewed.   Constitutional:       Appearance: He is well-developed.   HENT:      Head: Normocephalic and atraumatic.   Eyes:      Pupils: Pupils are equal, round, and reactive to light.   Cardiovascular:      Rate and Rhythm: Normal rate and regular rhythm.      Heart sounds: Normal heart sounds.   Pulmonary:      Effort: Pulmonary effort is normal.      Breath sounds: Normal breath sounds.   Abdominal:      " General: Bowel sounds are normal.      Palpations: Abdomen is soft.   Musculoskeletal:      Cervical back: Normal range of motion and neck supple.   Lymphadenopathy:      Cervical: No cervical adenopathy.   Skin:     General: Skin is warm.      Findings: No rash.   Neurological:      Mental Status: He is alert and oriented to person, place, and time.       Rory Herring MD

## 2024-02-21 NOTE — ASSESSMENT & PLAN NOTE
His A1c improved to 6.7.  This is well-controlled.  Continue on same medications and we will continue to monitor.  Lab Results   Component Value Date    HGBA1C 6.7 (H) 02/09/2024

## 2024-02-21 NOTE — ASSESSMENT & PLAN NOTE
Discussed with patient to cut down on his Norvasc to 5 mg daily for now.  Hold off Flomax.  I am going to check his CBC, CMP and urine UA and C&S.  He is to follow-up with cardiologist.  Was told to go to emergency room if symptoms get any worse.

## 2024-02-21 NOTE — ASSESSMENT & PLAN NOTE
His blood pressure was well-controlled while he was sitting.  His blood pressure dropped to 100/60 standing.

## 2024-02-27 ENCOUNTER — TELEPHONE (OUTPATIENT)
Age: 64
End: 2024-02-27

## 2024-02-27 DIAGNOSIS — I95.1 ORTHOSTATIC HYPOTENSION: Primary | ICD-10-CM

## 2024-02-27 NOTE — TELEPHONE ENCOUNTER
Pt states his amlodipine was changed to 5mg. Asking for a script to be sent because he cannot cut hs current 10mg in half.

## 2024-02-28 ENCOUNTER — APPOINTMENT (OUTPATIENT)
Dept: LAB | Facility: IMAGING CENTER | Age: 64
End: 2024-02-28
Payer: COMMERCIAL

## 2024-02-28 DIAGNOSIS — I95.1 ORTHOSTATIC HYPOTENSION: ICD-10-CM

## 2024-02-28 LAB
ALBUMIN SERPL BCP-MCNC: 4.1 G/DL (ref 3.5–5)
ALP SERPL-CCNC: 52 U/L (ref 34–104)
ALT SERPL W P-5'-P-CCNC: 6 U/L (ref 7–52)
ANION GAP SERPL CALCULATED.3IONS-SCNC: 5 MMOL/L
AST SERPL W P-5'-P-CCNC: 6 U/L (ref 13–39)
BACTERIA UR QL AUTO: ABNORMAL /HPF
BASOPHILS # BLD AUTO: 0.06 THOUSANDS/ÂΜL (ref 0–0.1)
BASOPHILS NFR BLD AUTO: 1 % (ref 0–1)
BILIRUB SERPL-MCNC: 0.49 MG/DL (ref 0.2–1)
BILIRUB UR QL STRIP: NEGATIVE
BUN SERPL-MCNC: 16 MG/DL (ref 5–25)
CALCIUM SERPL-MCNC: 8.8 MG/DL (ref 8.4–10.2)
CAOX CRY URNS QL MICRO: ABNORMAL /HPF
CHLORIDE SERPL-SCNC: 106 MMOL/L (ref 96–108)
CLARITY UR: CLEAR
CO2 SERPL-SCNC: 31 MMOL/L (ref 21–32)
COLOR UR: ABNORMAL
CREAT SERPL-MCNC: 1.01 MG/DL (ref 0.6–1.3)
EOSINOPHIL # BLD AUTO: 0.18 THOUSAND/ÂΜL (ref 0–0.61)
EOSINOPHIL NFR BLD AUTO: 3 % (ref 0–6)
ERYTHROCYTE [DISTWIDTH] IN BLOOD BY AUTOMATED COUNT: 13.7 % (ref 11.6–15.1)
GFR SERPL CREATININE-BSD FRML MDRD: 78 ML/MIN/1.73SQ M
GLUCOSE P FAST SERPL-MCNC: 114 MG/DL (ref 65–99)
GLUCOSE UR STRIP-MCNC: NEGATIVE MG/DL
HCT VFR BLD AUTO: 43.9 % (ref 36.5–49.3)
HGB BLD-MCNC: 13.9 G/DL (ref 12–17)
HGB UR QL STRIP.AUTO: NEGATIVE
IMM GRANULOCYTES # BLD AUTO: 0.02 THOUSAND/UL (ref 0–0.2)
IMM GRANULOCYTES NFR BLD AUTO: 0 % (ref 0–2)
KETONES UR STRIP-MCNC: NEGATIVE MG/DL
LEUKOCYTE ESTERASE UR QL STRIP: ABNORMAL
LYMPHOCYTES # BLD AUTO: 1.84 THOUSANDS/ÂΜL (ref 0.6–4.47)
LYMPHOCYTES NFR BLD AUTO: 26 % (ref 14–44)
MCH RBC QN AUTO: 28.3 PG (ref 26.8–34.3)
MCHC RBC AUTO-ENTMCNC: 31.7 G/DL (ref 31.4–37.4)
MCV RBC AUTO: 89 FL (ref 82–98)
MONOCYTES # BLD AUTO: 0.67 THOUSAND/ÂΜL (ref 0.17–1.22)
MONOCYTES NFR BLD AUTO: 10 % (ref 4–12)
MUCOUS THREADS UR QL AUTO: ABNORMAL
NEUTROPHILS # BLD AUTO: 4.23 THOUSANDS/ÂΜL (ref 1.85–7.62)
NEUTS SEG NFR BLD AUTO: 60 % (ref 43–75)
NITRITE UR QL STRIP: NEGATIVE
NON-SQ EPI CELLS URNS QL MICRO: ABNORMAL /HPF
NRBC BLD AUTO-RTO: 0 /100 WBCS
PH UR STRIP.AUTO: 6 [PH]
PLATELET # BLD AUTO: 226 THOUSANDS/UL (ref 149–390)
PMV BLD AUTO: 10.9 FL (ref 8.9–12.7)
POTASSIUM SERPL-SCNC: 4.5 MMOL/L (ref 3.5–5.3)
PROT SERPL-MCNC: 6.2 G/DL (ref 6.4–8.4)
PROT UR STRIP-MCNC: ABNORMAL MG/DL
RBC # BLD AUTO: 4.91 MILLION/UL (ref 3.88–5.62)
RBC #/AREA URNS AUTO: ABNORMAL /HPF
SODIUM SERPL-SCNC: 142 MMOL/L (ref 135–147)
SP GR UR STRIP.AUTO: 1.02 (ref 1–1.03)
UROBILINOGEN UR STRIP-ACNC: <2 MG/DL
WBC # BLD AUTO: 7 THOUSAND/UL (ref 4.31–10.16)
WBC #/AREA URNS AUTO: ABNORMAL /HPF

## 2024-02-28 PROCEDURE — 85025 COMPLETE CBC W/AUTO DIFF WBC: CPT

## 2024-02-28 PROCEDURE — 80053 COMPREHEN METABOLIC PANEL: CPT

## 2024-02-28 PROCEDURE — 81001 URINALYSIS AUTO W/SCOPE: CPT

## 2024-02-28 PROCEDURE — 36415 COLL VENOUS BLD VENIPUNCTURE: CPT

## 2024-02-29 RX ORDER — AMLODIPINE BESYLATE 5 MG/1
5 TABLET ORAL DAILY
Qty: 30 TABLET | Refills: 5 | Status: SHIPPED | OUTPATIENT
Start: 2024-02-29

## 2024-03-01 ENCOUNTER — TELEPHONE (OUTPATIENT)
Age: 64
End: 2024-03-01

## 2024-03-01 DIAGNOSIS — N40.0 BENIGN PROSTATIC HYPERPLASIA, UNSPECIFIED WHETHER LOWER URINARY TRACT SYMPTOMS PRESENT: Primary | ICD-10-CM

## 2024-03-01 NOTE — TELEPHONE ENCOUNTER
Patient is requesting for his labs to be reviewed. Patient states that he is dealing with urine frequency. Patient also states that he usually schedules a follow up appointment after each visit and this time he didn't and wants to know when he should come in again.

## 2024-03-04 NOTE — TELEPHONE ENCOUNTER
Pt aware of results and an order was placed for urology and pt is aware they will call him to schedule.

## 2024-03-12 ENCOUNTER — TELEPHONE (OUTPATIENT)
Age: 64
End: 2024-03-12

## 2024-03-12 NOTE — TELEPHONE ENCOUNTER
Appointment scheduled with provider.    Reason: Office Visit    Symptoms: Left arm pain for a couple of months, numbness and tingling feeling in feet and legs    Provider: Dr. Rory Herring    Date/Time:   Wednesday Mar 13, 2024 at 2:40 pm

## 2024-03-12 NOTE — TELEPHONE ENCOUNTER
Appointment scheduled with provider.    Reason: Office Visit    Symptoms: 3 month follow up    Provider:Dr. Rory Herring    Date/Time:   Wednesday May 22, 2024 at 2:20 pm

## 2024-03-13 ENCOUNTER — OFFICE VISIT (OUTPATIENT)
Dept: FAMILY MEDICINE CLINIC | Facility: CLINIC | Age: 64
End: 2024-03-13
Payer: COMMERCIAL

## 2024-03-13 VITALS
RESPIRATION RATE: 16 BRPM | WEIGHT: 195.4 LBS | TEMPERATURE: 97 F | OXYGEN SATURATION: 99 % | DIASTOLIC BLOOD PRESSURE: 68 MMHG | HEART RATE: 111 BPM | BODY MASS INDEX: 25.08 KG/M2 | HEIGHT: 74 IN | SYSTOLIC BLOOD PRESSURE: 126 MMHG

## 2024-03-13 DIAGNOSIS — M54.9 CHRONIC BILATERAL BACK PAIN, UNSPECIFIED BACK LOCATION: ICD-10-CM

## 2024-03-13 DIAGNOSIS — M51.36 LUMBAR DEGENERATIVE DISC DISEASE: Primary | ICD-10-CM

## 2024-03-13 DIAGNOSIS — M54.2 NECK PAIN: ICD-10-CM

## 2024-03-13 DIAGNOSIS — G89.29 CHRONIC BILATERAL BACK PAIN, UNSPECIFIED BACK LOCATION: ICD-10-CM

## 2024-03-13 PROCEDURE — 99213 OFFICE O/P EST LOW 20 MIN: CPT | Performed by: FAMILY MEDICINE

## 2024-03-13 RX ORDER — GABAPENTIN 300 MG/1
300 CAPSULE ORAL 2 TIMES DAILY
Qty: 60 CAPSULE | Refills: 5 | Status: SHIPPED | OUTPATIENT
Start: 2024-03-13

## 2024-03-13 RX ORDER — LANOLIN ALCOHOL/MO/W.PET/CERES
12 CREAM (GRAM) TOPICAL
COMMUNITY

## 2024-03-18 NOTE — PROGRESS NOTES
Name: German Louie      : 1960      MRN: [id number]  Encounter Provider: Rory Herring MD  Encounter Date: 3/13/2024   Encounter department: [facility]    Assessment & Plan     1. Lumbar degenerative disc disease  -     gabapentin (NEURONTIN) 300 mg capsule; Take 1 capsule (300 mg total) by mouth 2 (two) times a day    2. Neck pain  Assessment & Plan:  I am going to increase his gabapentin to 300 mg twice a day.  Discussed about possible side effect.  He is to follow-up with her spine surgeon.    Orders:  -     gabapentin (NEURONTIN) 300 mg capsule; Take 1 capsule (300 mg total) by mouth 2 (two) times a day    3. Chronic bilateral back pain, unspecified back location  Assessment & Plan:  I am going to increase his gabapentin to 300 mg twice a day.  Discussed about possible side effect.  Discussed about referral to see spine and pain management.    Orders:  -     gabapentin (NEURONTIN) 300 mg capsule; Take 1 capsule (300 mg total) by mouth 2 (two) times a day           Subjective     He is here today with complaint having increased pain in his upper extremity radiating from his neck.  He had neck and back surgery previously by neurosurgeon and he feels that the symptoms are coming back after all this years.  Denies any recent history of injury or trauma.    Arm Pain   Associated symptoms include numbness. Pertinent negatives include no chest pain.   Neck Pain   Associated symptoms include numbness. Pertinent negatives include no chest pain, fever, headaches or trouble swallowing.   Shoulder Pain   Associated symptoms include numbness. Pertinent negatives include no fever.     Review of Systems   Constitutional:  Negative for chills and fever.   HENT:  Negative for trouble swallowing.    Eyes:  Negative for visual disturbance.   Respiratory:  Negative for cough and shortness of breath.    Cardiovascular:  Negative for chest pain and palpitations.   Gastrointestinal:  Negative  for abdominal pain, blood in stool and vomiting.   Endocrine: Negative for cold intolerance and heat intolerance.   Genitourinary:  Negative for difficulty urinating and dysuria.   Musculoskeletal:  Positive for back pain, gait problem and neck pain.   Skin:  Negative for rash.   Neurological:  Positive for numbness. Negative for dizziness, syncope and headaches.   Hematological:  Negative for adenopathy.   Psychiatric/Behavioral:  Negative for behavioral problems.        Past Medical History:   Diagnosis Date   • Diabetes mellitus (HCC)    • HLD (hyperlipidemia)    • Hypertension      Past Surgical History:   Procedure Laterality Date   • ANKLE SURGERY Right    • BACK SURGERY      L4-L5   • NECK SURGERY       Family History   Problem Relation Age of Onset   • Diabetes Mother    • Diabetes Father    • Heart disease Maternal Grandfather    • Heart disease Paternal Grandfather      Social History     Socioeconomic History   • Marital status: /Civil Union     Spouse name: None   • Number of children: None   • Years of education: None   • Highest education level: None   Occupational History   • None   Tobacco Use   • Smoking status: Every Day     Current packs/day: 1.00     Average packs/day: 1 pack/day for 24.2 years (24.2 ttl pk-yrs)     Types: Cigarettes     Start date: 2000   • Smokeless tobacco: Never   Vaping Use   • Vaping status: Never Used   Substance and Sexual Activity   • Alcohol use: Yes     Comment: rare   • Drug use: Never   • Sexual activity: Not Currently   Other Topics Concern   • None   Social History Narrative   • None     Social Determinants of Health     Financial Resource Strain: Low Risk  (9/1/2023)    Received from [facility]    Overall Financial Resource Strain (CARDIA)    • Difficulty of Paying Living Expenses: Not very hard   Food Insecurity: No Food Insecurity (9/1/2023)    Received from [facility]    Hunger Vital Sign    • Worried About Running Out  of Food in the Last Year: Never true    • Ran Out of Food in the Last Year: Never true   Transportation Needs: No Transportation Needs (9/5/2023)    Received from [facility]    PRAWILLIAME - Transportation    • Lack of Transportation (Medical): No    • Lack of Transportation (Non-Medical): No   Physical Activity: Not on file   Stress: Not on file   Social Connections: Feeling Socially Integrated (9/5/2023)    Received from [facility]    OASIS : Social Isolation    • How often do you feel lonely or isolated from those around you?: Never   Intimate Partner Violence: Not At Risk (9/1/2023)    Received from [facility]    Humiliation, Afraid, Rape, and Kick questionnaire    • Fear of Current or Ex-Partner: No    • Emotionally Abused: No    • Physically Abused: No    • Sexually Abused: No   Housing Stability: Low Risk  (9/1/2023)    Received from [facility]    Housing Stability Vital Sign    • Unable to Pay for Housing in the Last Year: No    • Number of Places Lived in the Last Year: 1    • Unstable Housing in the Last Year: No     Current Outpatient Medications on File Prior to Visit   Medication Sig   • Accu-Chek Guide test strip    • acetaminophen (TYLENOL) 500 mg tablet Take 500 mg by mouth every 4 (four) hours as needed   • Alcohol Swabs PADS As needed   • ALPRAZolam (XANAX) 0.5 mg tablet Take 1 tablet (0.5 mg total) by mouth 2 (two) times a day as needed for anxiety   • amLODIPine (NORVASC) 10 mg tablet Take 1 tablet (10 mg total) by mouth daily   • amLODIPine (NORVASC) 5 mg tablet Take 1 tablet (5 mg total) by mouth daily   • aspirin (ECOTRIN LOW STRENGTH) 81 mg EC tablet Take 81 mg by mouth daily   • atorvastatin (LIPITOR) 40 mg tablet Take 1 tablet (40 mg total) by mouth daily   • Blood Glucose Monitoring Suppl (Accu-Chek Guide) w/Device KIT    • Cholecalciferol 50 MCG (2000 UT) CAPS Take 1 capsule by mouth daily   • fenofibrate micronized  "(LOFIBRA) 134 MG capsule Take 1 capsule (134 mg total) by mouth daily with breakfast   • GNP Alcohol Swabs 70 % PADS    • Insulin Pen Needle (Pen Needles) 33G X 4 MM MISC Use daily with Toujeo   • lisinopril (ZESTRIL) 5 mg tablet Take 1 tablet (5 mg total) by mouth daily   • melatonin 3 mg Take 12 mg by mouth   • ondansetron (ZOFRAN) 4 mg tablet Take 1 tablet (4 mg total) by mouth every 8 (eight) hours as needed for nausea or vomiting   • pantoprazole (PROTONIX) 40 mg tablet Take 1 tablet (40 mg total) by mouth daily before breakfast   • tamsulosin (FLOMAX) 0.4 mg Take 1 capsule (0.4 mg total) by mouth daily with dinner   • clopidogrel (PLAVIX) 75 mg tablet Take 1 tablet (75 mg total) by mouth daily (Patient not taking: Reported on 2/21/2024)   • insulin glargine (Toujeo Max SoloStar) 300 units/mL CONCENTRATED U-300 injection pen (2-unit dial) Inject 34 Units under the skin daily at bedtime (Patient taking differently: Inject 32 Units under the skin daily at bedtime)   • oxyCODONE (ROXICODONE) 5 immediate release tablet  (Patient not taking: Reported on 9/26/2023)   • triamcinolone (KENALOG) 0.1 % cream Apply topically 2 (two) times a day (Patient not taking: Reported on 9/12/2023)     Allergies   Allergen Reactions   • Zolpidem Other (See Comments)     severe drowsiness     Immunization History   Administered Date(s) Administered   • COVID-19 MODERNA VACC 0.5 ML IM 04/26/2021, 05/24/2021, 12/27/2021   • INFLUENZA 10/10/2023   • Influenza, injectable, quadrivalent, preservative free 0.5 mL 10/10/2023   • Respiratory Syncytial Virus Vaccine (Recombinant) 11/21/2023   • Tdap 12/01/2020       Objective     /68 (BP Location: Left arm, Patient Position: Sitting, Cuff Size: Large)   Pulse (!) 111   Temp (!) 97 °F (36.1 °C) (Tympanic)   Resp 16   Ht 6' 2\" (1.88 m)   Wt 88.6 kg (195 lb 6.4 oz)   SpO2 99%   BMI 25.09 kg/m²     Physical Exam  Vitals and nursing note reviewed.   Constitutional:       Appearance: " He is well-developed.   HENT:      Head: Normocephalic and atraumatic.   Eyes:      Pupils: Pupils are equal, round, and reactive to light.   Cardiovascular:      Rate and Rhythm: Normal rate and regular rhythm.      Heart sounds: Normal heart sounds.   Pulmonary:      Effort: Pulmonary effort is normal.      Breath sounds: Normal breath sounds.   Abdominal:      General: Bowel sounds are normal.      Palpations: Abdomen is soft.   Musculoskeletal:         General: Tenderness present.   Lymphadenopathy:      Cervical: No cervical adenopathy.   Skin:     General: Skin is warm.      Findings: No rash.   Neurological:      Mental Status: He is alert and oriented to person, place, and time.       Rory Herring MD

## 2024-03-19 ENCOUNTER — TELEPHONE (OUTPATIENT)
Age: 64
End: 2024-03-19

## 2024-03-19 DIAGNOSIS — N40.0 BENIGN PROSTATIC HYPERPLASIA, UNSPECIFIED WHETHER LOWER URINARY TRACT SYMPTOMS PRESENT: Primary | ICD-10-CM

## 2024-03-19 NOTE — TELEPHONE ENCOUNTER
Patient called to advise that he tried to schedule referral to Urology with [facility] provider but they did not have anything available for 8 months. He scheduled an appointment with  Urology at [facility].  Patient requested if the same referral can be faxed to  Urology at [phone]. If not, can the new referral be faxed over to  Urology.

## 2024-03-24 NOTE — ASSESSMENT & PLAN NOTE
I am going to increase his gabapentin to 300 mg twice a day.  Discussed about possible side effect.  Discussed about referral to see spine and pain management.

## 2024-03-24 NOTE — ASSESSMENT & PLAN NOTE
I am going to increase his gabapentin to 300 mg twice a day.  Discussed about possible side effect.  He is to follow-up with her spine surgeon.

## 2024-04-22 ENCOUNTER — TELEPHONE (OUTPATIENT)
Age: 64
End: 2024-04-22

## 2024-04-22 NOTE — TELEPHONE ENCOUNTER
Appointment scheduled with provider.    Reason: Office Visit    Symptoms: 3 month follow up    Provider: Dr. Rory Herring    Date/Time: Friday 5/24/2024 at 1:20 pm

## 2024-04-24 DIAGNOSIS — F41.9 ANXIETY: ICD-10-CM

## 2024-04-24 NOTE — TELEPHONE ENCOUNTER
Pt last seen 3/13/24. Requesting Xanax.        Patient Prescription Report  Patients  SELECT PATIENT ID NAME  [facility] STATE   1 DIONTE SERRANO JR 1960 M 75 YEATS [id number] [place]   2 DIONTE SERRANO 1960 M 75 YEATS [id number] [place]      Search Criteria  NAME DATE OF BIRTH DATE RANGE  dionte serrano 1960 To 2024  REQUESTER NAME REQUESTED DATE  ETHANSAMEER FREEMANMRA 2024 14:37:57 ([facility])  Summary  Prescriptions  11  Prescribers  4  Pharmacies  1  Drug Classes  Benzodiazepines  10  Stimulants  0  Opioids  1  Muscle Relaxants  0  Opioid Dosage  Total MME for Active Prescriptions  0    Average MME  22.50         Prescriptions  Notifications    Prescribers  Pharmacies  MME Graph    Show All     [place]   Drug Categories:      Benzodiazepines       Opioids     Show  10  entries  Search:  PATIENT ID PRESCRIPTION # FILLED WRITTEN DRUG LABEL QTY DAYS STRENGTH MME** PRESCRIBER PHARMACY PAYMENT REFILL #/AUTH STATE DETAIL  1 [id number] 2024 diazePAM (Tablet) 2.0 1 10 MG NA [facility]ANDA [facility] [place] / 0 [place]   1 [id number] 2024 ALPRAZolam (Tablet) 45.0 22 0.5 MG NA MARIANNA) [facility] [place] 2 / 2 [place]   1 [id number] 2024 ALPRAZolam (Tablet) 45.0 22 0.5 MG NA MARIANNA) [facility] [place] 1 / 2 [place]   1 [id number] 2024 ALPRAZolam (Tablet) 45.0 22 0.5 MG NA WASTORSTENM(MD) [facility] [place] / 2 [place]   1 [id number] 2024 ALPRAZolam (Tablet) 45.0 22 0.5 MG NA WASTORSTENM(MD) [facility] [place] 2 / 2 [place]   2 [id number] 2023 ALPRAZolam (Tablet) 45.0 22 0.5 MG NA MARIANNA) [facility] [place] 1 / 2 [place]   2 [id number] 2023 ALPRAZolam (Tablet) 45.0 22 0.5 MG DARCIE CABRAL) [facility] [place] / 2 [place]   2 [id number] 2023 ALPRAZolam  (Tablet) 30.0 30 0.5 MG NA BERLINM(MD) [facility] [place] / 0 [place]   2 [id number] 10/10/2023 10/06/2023 ALPRAZolam (Tablet) 30.0 30 0.5 MG NA WASSAMEER(MD) [facility] [place] / 0 [place]   2 [id number] 09/12/2023 09/12/2023 ALPRAZolam (Tablet) 30.0 30 0.5 MG NA ALICJA K[facility] [place] / [place]

## 2024-04-24 NOTE — TELEPHONE ENCOUNTER
Reason for call:   [x] Refill   [] Prior Auth  [] Other:     Office:   [x] PCP/Provider - [facility]  [] Specialty/Provider -     Medication:      Does the patient have enough for 3 days?   [] Yes   [x] No - Send as HP to POD

## 2024-04-25 ENCOUNTER — TELEPHONE (OUTPATIENT)
Age: 64
End: 2024-04-25

## 2024-04-25 RX ORDER — ALPRAZOLAM 0.5 MG/1
0.5 TABLET ORAL 2 TIMES DAILY PRN
Qty: 45 TABLET | Refills: 1 | Status: SHIPPED | OUTPATIENT
Start: 2024-04-25

## 2024-04-25 RX ORDER — ALPRAZOLAM 0.5 MG/1
0.5 TABLET ORAL 2 TIMES DAILY PRN
Qty: 45 TABLET | Refills: 2 | OUTPATIENT
Start: 2024-04-25

## 2024-04-25 NOTE — TELEPHONE ENCOUNTER
Pharmacy requesting refill on Xanax  Last seen 3/13/24  Has appt 5/24/24       1 GERMAN SERRANO JR 1960 M 75 YEATS [id number] [facility]   2 GERMAN SERRANO 1960 M 75 YEATS [id number] [facility]      Search Criteria  NAME DATE OF BIRTH DATE RANGE  German Serrano 1960 04- To 04-  REQUESTER NAME REQUESTED DATE  ALEXA EDENA 04- 11:13:27 ([facility])  Summary  Prescriptions  11  Prescribers  4  Pharmacies  1  Drug Classes  Benzodiazepines  10  Stimulants  0  Opioids  1  Muscle Relaxants  0  Opioid Dosage  Total MME for Active Prescriptions  0    Average MME  22.50         Prescriptions  Notifications    Prescribers  Pharmacies  MME Graph    Show All     [place]   Drug Categories:      Benzodiazepines       Opioids     Show  10  entries  Search:  PATIENT ID PRESCRIPTION # FILLED WRITTEN DRUG LABEL QTY DAYS STRENGTH MME** PRESCRIBER PHARMACY PAYMENT REFILL #/AUTH STATE DETAIL  1 [id number] 04/19/2024 04/19/2024 diazePAM (Tablet) 2.0 1 10 MG NA [facility]DA [facility] [place] 0 / 0 [place]   1 [id number] 04/03/2024 02/21/2024 ALPRAZolam (Tablet) 45.0 22 0.5 MG [facility] / [place]   1 [id number] 03/13/2024 02/21/2024 ALPRAZolam (Tablet) 45.0 22 0.5 MG NA [facility] [place] [facility] [place] / 2 [place]   1 [id number] 02/21/2024 02/21/2024 ALPRAZolam (Tablet) 45.0 22 0.5 MG [facility] [place] [facility] / [place]   1 [id number] 01/02/2024 11/21/2023 ALPRAZolam (Tablet) 45.0 22 0.5 MG NA BERLINM(MD) [facility] [place] 2 / 2 [place]   2 [id number] 12/11/2023 11/21/2023 ALPRAZolam (Tablet) 45.0 22 0.5 MG NA BERLINM(MD) [facility] Commercial [place] 1 / 2 [place]   2 [id number] 11/21/2023 11/21/2023 ALPRAZolam (Tablet) 45.0 22 0.5 MG NA ALEXA(MD) [facility] Commercial [place] 0 / 2 [place]   2 [id number] 11/07/2023 11/06/2023 ALPRAZolam (Tablet) 30.0 30 0.5 MG NA MARIANNA) [facility] [place] 0 / 0 [place]   2 [id number] 10/10/2023 10/06/2023 ALPRAZolam (Tablet) 30.0 30 0.5 MG NA MARIANNA) [facility] [place] 0 / 0 [place]   2 [id number] 09/12/2023 09/12/2023 ALPRAZolam (Tablet) 30.0 30 0.5 MG NA ALICJA [facility] [place] 0 / 0 [place]

## 2024-04-25 NOTE — TELEPHONE ENCOUNTER
Patient saw neuro who recommend patient get a referral for vascular surgeon. Please notify patient

## 2024-04-26 NOTE — TELEPHONE ENCOUNTER
Pt is looking to get a vascular ref for his PVD from his pcp which was mentioned in the neurology note dated 4/16/24.  Should we place order?  Please advise

## 2024-04-27 DIAGNOSIS — I73.9 PERIPHERAL VASCULAR DISEASE (HCC): Primary | ICD-10-CM

## 2024-04-29 NOTE — TELEPHONE ENCOUNTER
LM stating referral was placed  Vascular should give him a call to schedule, if he does not hear from them he is to give us a call back.

## 2024-04-29 NOTE — TELEPHONE ENCOUNTER
Pt is requesting that the referral be made for him to see [facility] vascular surgery. Please advise pt when changed

## 2024-05-06 ENCOUNTER — TELEPHONE (OUTPATIENT)
Age: 64
End: 2024-05-06

## 2024-05-06 NOTE — TELEPHONE ENCOUNTER
Patient has follow up appointment 6/3/24 and questioned if he needs lab work completed for this appointment.  There are no active lab orders in his chart.  Please advise and return patients call.

## 2024-05-06 NOTE — TELEPHONE ENCOUNTER
Pt wondering if he needs tp get blood work done before follow up appt on 6/3/24. Please advise.   11.6   7.31  )-----------( 135      ( 05 Feb 2019 07:35 )             35.9     02-05    143  |  106  |  16  ----------------------------<  91  4.2   |  25  |  1.18    Ca    9.1      05 Feb 2019 07:35  Phos  3.0     02-05  Mg     1.9     02-05    TPro  7.2  /  Alb  3.7  /  TBili  0.6  /  DBili  x   /  AST  52<H>  /  ALT  45<H>  /  AlkPhos  57  02-04

## 2024-05-09 DIAGNOSIS — R80.9 TYPE 2 DIABETES MELLITUS WITH MICROALBUMINURIA, WITH LONG-TERM CURRENT USE OF INSULIN (HCC): Primary | ICD-10-CM

## 2024-05-09 DIAGNOSIS — E11.29 TYPE 2 DIABETES MELLITUS WITH MICROALBUMINURIA, WITH LONG-TERM CURRENT USE OF INSULIN (HCC): Primary | ICD-10-CM

## 2024-05-09 DIAGNOSIS — Z79.4 TYPE 2 DIABETES MELLITUS WITH MICROALBUMINURIA, WITH LONG-TERM CURRENT USE OF INSULIN (HCC): Primary | ICD-10-CM

## 2024-05-16 DIAGNOSIS — N40.0 BENIGN PROSTATIC HYPERPLASIA, UNSPECIFIED WHETHER LOWER URINARY TRACT SYMPTOMS PRESENT: ICD-10-CM

## 2024-05-16 RX ORDER — TAMSULOSIN HYDROCHLORIDE 0.4 MG/1
0.4 CAPSULE ORAL
Qty: 90 CAPSULE | Refills: 1 | Status: SHIPPED | OUTPATIENT
Start: 2024-05-16

## 2024-05-16 NOTE — TELEPHONE ENCOUNTER
Reason for call:   [x] Refill   [] Prior Auth  [] Other:     Office:   [x] PCP/Provider -   [] Specialty/Provider -     Medication:   Tamsulosin 0.4mg- take 1 capsule by mouth daily with dinner       Pharmacy[facility]    Does the patient have enough for 3 days?   [] Yes   [x] No - Send as HP to POD

## 2024-05-28 DIAGNOSIS — I63.89 CEREBROVASCULAR ACCIDENT (CVA) DUE TO OTHER MECHANISM (HCC): ICD-10-CM

## 2024-05-29 RX ORDER — LISINOPRIL 5 MG/1
5 TABLET ORAL DAILY
Qty: 90 TABLET | Refills: 1 | Status: SHIPPED | OUTPATIENT
Start: 2024-05-29 | End: 2024-11-25

## 2024-05-31 ENCOUNTER — OFFICE VISIT (OUTPATIENT)
Dept: FAMILY MEDICINE CLINIC | Facility: CLINIC | Age: 64
End: 2024-05-31
Payer: COMMERCIAL

## 2024-05-31 VITALS
OXYGEN SATURATION: 97 % | HEIGHT: 74 IN | TEMPERATURE: 97.6 F | WEIGHT: 209.2 LBS | SYSTOLIC BLOOD PRESSURE: 124 MMHG | RESPIRATION RATE: 16 BRPM | DIASTOLIC BLOOD PRESSURE: 80 MMHG | BODY MASS INDEX: 26.85 KG/M2 | HEART RATE: 95 BPM

## 2024-05-31 DIAGNOSIS — M54.12 RADICULOPATHY, CERVICAL: Primary | ICD-10-CM

## 2024-05-31 DIAGNOSIS — E11.29 TYPE 2 DIABETES MELLITUS WITH MICROALBUMINURIA, WITH LONG-TERM CURRENT USE OF INSULIN (HCC): ICD-10-CM

## 2024-05-31 DIAGNOSIS — M54.2 NECK PAIN: ICD-10-CM

## 2024-05-31 DIAGNOSIS — Z79.4 TYPE 2 DIABETES MELLITUS WITH MICROALBUMINURIA, WITH LONG-TERM CURRENT USE OF INSULIN (HCC): ICD-10-CM

## 2024-05-31 DIAGNOSIS — Z01.818 PRE-OP EXAMINATION: ICD-10-CM

## 2024-05-31 DIAGNOSIS — R80.9 TYPE 2 DIABETES MELLITUS WITH MICROALBUMINURIA, WITH LONG-TERM CURRENT USE OF INSULIN (HCC): ICD-10-CM

## 2024-05-31 PROBLEM — E11.42 DIABETIC POLYNEUROPATHY ASSOCIATED WITH TYPE 2 DIABETES MELLITUS (HCC): Status: ACTIVE | Noted: 2024-04-16

## 2024-05-31 PROBLEM — M54.16 RADICULOPATHY, LUMBAR REGION: Status: ACTIVE | Noted: 2024-04-19

## 2024-05-31 PROBLEM — Z98.1 ARTHRODESIS STATUS: Status: ACTIVE | Noted: 2024-04-19

## 2024-05-31 PROBLEM — R26.9 GAIT ABNORMALITY: Status: ACTIVE | Noted: 2024-04-16

## 2024-05-31 PROCEDURE — 99214 OFFICE O/P EST MOD 30 MIN: CPT | Performed by: FAMILY MEDICINE

## 2024-05-31 NOTE — PROGRESS NOTES
Ambulatory Visit  Name: Greman Louie      : 1960      MRN: [id number]  Encounter Provider: Rory Herring MD  Encounter Date: 2024   Encounter department:  [facility]    Assessment & Plan   1. Radiculopathy, cervical  Assessment & Plan:  He is going for C6-7 Anterior Cervical Discectomy and Fusion Allograft, Plate, and Screws Inpsect/Remove C5-6 Fusion on 24   2. Pre-op examination  Assessment & Plan:  He is low risk from cardiac standpoint as per cardiologist.  His blood work is acceptable.  The patient is an acceptable risk for the proposed procedure.  3. Type 2 diabetes mellitus with microalbuminuria, with long-term current use of insulin ([facility])  Assessment & Plan:  Well-controlled.  Continue same.  Will continue to monitor.  He was told to use half dose of his Lantus 17 units nightly and the night before his surgery.  Lab Results   Component Value Date    HGBA1C 6.7 (H) 2024     4. Neck pain         History of Present Illness     Is here today for preop clearance for cervical spine surgery.  He denies any complaint other than neck and back pain.  Denies any shortness of breath denies any chest pain or palpitation.  He was seen by cardiologist and he was deemed to be low risk from the cardiac standpoint.  He had blood work done recently came back acceptable.      Review of Systems   Constitutional:  Negative for chills and fever.   HENT:  Negative for trouble swallowing.    Eyes:  Negative for visual disturbance.   Respiratory:  Negative for cough and shortness of breath.    Cardiovascular:  Negative for chest pain and palpitations.   Gastrointestinal:  Negative for abdominal pain, blood in stool and vomiting.   Endocrine: Negative for cold intolerance and heat intolerance.   Genitourinary:  Negative for difficulty urinating and dysuria.   Musculoskeletal:  Positive for gait problem.   Skin:  Negative for rash.   Neurological:  Negative for dizziness, syncope  and headaches.   Hematological:  Negative for adenopathy.   Psychiatric/Behavioral:  Negative for behavioral problems.      Past Medical History:   Diagnosis Date   • Diabetes mellitus (HCC)    • HLD (hyperlipidemia)    • Hypertension      Past Surgical History:   Procedure Laterality Date   • ANKLE SURGERY Right    • BACK SURGERY      L4-L5   • NECK SURGERY       Family History   Problem Relation Age of Onset   • Diabetes Mother    • Diabetes Father    • Heart disease Maternal Grandfather    • Heart disease Paternal Grandfather      Social History     Tobacco Use   • Smoking status: Every Day     Current packs/day: 1.00     Average packs/day: 1 pack/day for 24.4 years (24.4 ttl pk-yrs)     Types: Cigarettes     Start date: 2000   • Smokeless tobacco: Never   Vaping Use   • Vaping status: Never Used   Substance and Sexual Activity   • Alcohol use: Yes     Comment: rare   • Drug use: Never   • Sexual activity: Not Currently     Current Outpatient Medications on File Prior to Visit   Medication Sig   • acetaminophen (TYLENOL) 500 mg tablet Take 500 mg by mouth every 4 (four) hours as needed   • Alcohol Swabs PADS As needed   • ALPRAZolam (XANAX) 0.5 mg tablet TAKE 1 TABLET (0.5 MG TOTAL) BY MOUTH 2 (TWO) TIMES A DAY AS NEEDED FOR ANXIETY   • amLODIPine (NORVASC) 10 mg tablet Take 1 tablet (10 mg total) by mouth daily   • aspirin (ECOTRIN LOW STRENGTH) 81 mg EC tablet Take 81 mg by mouth daily   • atorvastatin (LIPITOR) 40 mg tablet Take 1 tablet (40 mg total) by mouth daily   • Blood Glucose Monitoring Suppl (Accu-Chek Guide) w/Device KIT    • fenofibrate micronized (LOFIBRA) 134 MG capsule Take 1 capsule (134 mg total) by mouth daily with breakfast   • gabapentin (NEURONTIN) 300 mg capsule Take 1 capsule (300 mg total) by mouth 2 (two) times a day   • GNP Alcohol Swabs 70 % PADS    • Insulin Pen Needle (Pen Needles) 33G X 4 MM MISC Use daily with Toujeo   • lisinopril (ZESTRIL) 5 mg tablet TAKE 1 TABLET (5 MG TOTAL)  "BY MOUTH DAILY   • melatonin 3 mg Take 12 mg by mouth   • ondansetron (ZOFRAN) 4 mg tablet Take 1 tablet (4 mg total) by mouth every 8 (eight) hours as needed for nausea or vomiting   • pantoprazole (PROTONIX) 40 mg tablet Take 1 tablet (40 mg total) by mouth daily before breakfast   • tamsulosin (FLOMAX) 0.4 mg Take 1 capsule (0.4 mg total) by mouth daily with dinner   • Accu-Chek Guide test strip    • Cholecalciferol 50 MCG (2000 UT) CAPS Take 1 capsule by mouth daily   • insulin glargine (Toujeo Max SoloStar) 300 units/mL CONCENTRATED U-300 injection pen (2-unit dial) Inject 34 Units under the skin daily at bedtime (Patient taking differently: Inject 32 Units under the skin daily at bedtime)   • oxyCODONE (ROXICODONE) 5 immediate release tablet  (Patient not taking: Reported on 9/26/2023)   • triamcinolone (KENALOG) 0.1 % cream Apply topically 2 (two) times a day (Patient not taking: Reported on 9/12/2023)   • [DISCONTINUED] amLODIPine (NORVASC) 5 mg tablet Take 1 tablet (5 mg total) by mouth daily   • [DISCONTINUED] clopidogrel (PLAVIX) 75 mg tablet Take 1 tablet (75 mg total) by mouth daily (Patient not taking: Reported on 2/21/2024)     Allergies   Allergen Reactions   • Grass Pollen(K-O-R-T-Swt Jairo) Other (See Comments)     Sneezing, congestion   • Zolpidem Other (See Comments)     severe drowsiness     Immunization History   Administered Date(s) Administered   • COVID-19 MODERNA VACC 0.5 ML IM 04/26/2021, 05/24/2021, 12/27/2021   • INFLUENZA 10/10/2023   • Influenza, injectable, quadrivalent, preservative free 0.5 mL 10/10/2023   • Respiratory Syncytial Virus Vaccine (Recombinant) 11/21/2023   • Tdap 12/01/2020     Objective     /80 (BP Location: Left arm, Patient Position: Sitting, Cuff Size: Standard)   Pulse 95   Temp 97.6 °F (36.4 °C) (Tympanic)   Resp 16   Ht 6' 2\" (1.88 m)   Wt 94.9 kg (209 lb 3.2 oz)   SpO2 97%   BMI 26.86 kg/m²     Physical Exam  Vitals and nursing note reviewed. "   Constitutional:       Appearance: He is well-developed.   HENT:      Head: Normocephalic and atraumatic.   Eyes:      Pupils: Pupils are equal, round, and reactive to light.   Cardiovascular:      Rate and Rhythm: Normal rate and regular rhythm.      Heart sounds: Normal heart sounds.   Pulmonary:      Effort: Pulmonary effort is normal.      Breath sounds: Normal breath sounds.   Abdominal:      General: Bowel sounds are normal.      Palpations: Abdomen is soft.   Lymphadenopathy:      Cervical: No cervical adenopathy.   Skin:     General: Skin is warm.      Findings: No rash.   Neurological:      Mental Status: He is alert and oriented to person, place, and time.       Administrative Statements

## 2024-05-31 NOTE — ASSESSMENT & PLAN NOTE
He is low risk from cardiac standpoint as per cardiologist.  His blood work is acceptable.  The patient is an acceptable risk for the proposed procedure.

## 2024-05-31 NOTE — ASSESSMENT & PLAN NOTE
He is going for C6-7 Anterior Cervical Discectomy and Fusion Allograft, Plate, and Screws Inpsect/Remove C5-6 Fusion on 6/4/24

## 2024-05-31 NOTE — ASSESSMENT & PLAN NOTE
Well-controlled.  Continue same.  Will continue to monitor.  He was told to use half dose of his Lantus 17 units nightly and the night before his surgery.  Lab Results   Component Value Date    HGBA1C 6.7 (H) 02/09/2024

## 2024-06-11 DIAGNOSIS — F41.9 ANXIETY: ICD-10-CM

## 2024-06-11 NOTE — TELEPHONE ENCOUNTER
Last seen 24 and I copied past refills into chart from pdmp web site and sent to provider for approval    ELECT PATIENT ID NAME  [facility] STATE   1 DIONTE SERRANO JR 1960 M 75 YEATS [id number] [place]   2 DIONTE SERRANO 1960 M 75 YEATS [id number] [place]      Search Criteria  NAME DATE OF BIRTH DATE RANGE  dionte serrano 1960 To 2024  REQUESTER NAME REQUESTED DATE  ALEXA EDENMICHELL 2024 19:11:43 ([facility])  Summary  Prescriptions  15  Prescribers  5  Pharmacies  1  Drug Classes  Benzodiazepines  13  Stimulants  0  Opioids  2  Muscle Relaxants  0  Opioid Dosage  Total MME for Active Prescriptions  21    Average MME  21.14         Prescriptions  Notifications  1  Prescribers  Pharmacies  MME Graph    Show All     [place]   Drug Categories:      Opioids       Benzodiazepines     Show  10  entries  Search:  PATIENT ID PRESCRIPTION # FILLED WRITTEN DRUG LABEL QTY DAYS STRENGTH MME** PRESCRIBER PHARMACY PAYMENT REFILL #/AUTH STATE DETAIL  1 [id number] 2024 oxyCODONE HCL (Tablet) 42.0 30 10 MG 21.0 LANA WOLF [facility] [employer] [place] 0 / 0 [place]   1 [id number] 2024 diazePAM (Tablet) 2.0 1 10 MG NA SHAWANDA LOU [facility] [employer] Commercial [place] 0 / 0 [place]   1 [id number] 2024 ALPRAZolam (Tablet) 45.0 22 0.5 MG NA MARIANNA) [facility] [employer] Commercial [place] 1 / 1 [place]   1 [id number] 2024 ALPRAZolam (Tablet) 45.0 22 0.5 MG NA MARIANNA) [facility] [employer] [place] 0 / 1 [place]   1 [id number] 2024 diazePAM (Tablet) 2.0 1 10 MG NA SHAWANDA LOU [facility] [employer] [place] 0 / 0 [place]   1 [id number] 2024 ALPRAZolam (Tablet) 45.0 22 0.5 MG NA MARIANNA) [facility] [employer] Commercial [place] 2 / 2 [place]   1 [id number] 2024 ALPRAZolam (Tablet) 45.0 22 0.5 MG NA MARIANNA) [facility] Commercial Insurance   2 [place]   1 [id number] 02/21/2024 02/21/2024 ALPRAZolam (Tablet) 45.0 22 0.5 MG NA WASSIM(MD) [facility] Commercial [place] 0 / 2 [place]   1 [id number] 01/02/2024 11/21/2023 ALPRAZolam (Tablet) 45.0 22 0.5 MG NA WASSIM(MD) [facility] Commercial Insurance 2 / 2 [place]   2 [id number] 12/11/2023 11/21/2023 ALPRAZolam (Tablet) 45.0 22 0.5 MG NA WASSIM(MD) [facility] Commercial Insurance 1 / 2 [place]   Showing 1 to 10 of 15 [id number]    * Per CDC guidance, the conversion factors and associated daily morphine milligram equivalents for drugs prescribed as part of medication-assisted treatment for opioid use disorder should not be used to benchmark against dosage thresholds meant for opioids prescribed for pain.    Report Disclaimer:    Information from the [place] Prescription Drug Monitoring Program (PDMP) database is protected health information and any information accessed must be treated as confidential. Any person who knowingly or intentionally makes an unauthorized disclosure of information from the PDMP database will be subject to civil and criminal penalties as set forth in the ABC-MAP Act 191 of 2014; Act of Oct. 27, 2014, P.L. 2911, No. 191.    The information in the PDMP database is submitted by pharmacies and may contain errors and omissions. Additionally, pharmacies may submit extraneous non-controlled substance dispensations to the PDMP database; if a non-controlled substance is present on one patient’s Prescription Report, it should not be assumed that this same medication will be reported on another patient’s Prescription Report. Independent verification of prescription information with pharmacies and prescribers may sometimes be prudent or necessary.    Educational content  CDC MME calculation guidelines  [place] Prescribing Guidelines  Letter Regarding the Misapplication of Prescribing Guidelines  Guide for Appropriate Tapering or Discontinuation of Long-Term Opioid  Use  #[id number]

## 2024-06-11 NOTE — TELEPHONE ENCOUNTER
Reason for call:   [x] Refill   [] Prior Auth  [] Other:     Office:   [x] PCP/Provider - Rory Herring MD   [] Specialty/Provider -     Medication: ALPRAZolam (XANAX) 0.5 mg tablet     Dose/Frequency:     0.5 mg, Oral, 2 times daily PRN       Quantity: 45    [place]: [facility] [place] [address]     Does the patient have enough for 3 days?   [] Yes   [x] No - Out Of Medication.

## 2024-06-12 RX ORDER — ALPRAZOLAM 0.5 MG/1
0.5 TABLET ORAL 2 TIMES DAILY PRN
Qty: 45 TABLET | Refills: 0 | Status: SHIPPED | OUTPATIENT
Start: 2024-06-12

## 2024-06-12 NOTE — TELEPHONE ENCOUNTER
Patient called to request a refill for their ALPRAZolam (XANAX) 0.5 mg tablet  advised a refill was requested on 06- and is pending approval. Patient verbalized understanding and is in agreement.       Patient stated he is out of medication at this time.

## 2024-06-20 ENCOUNTER — TELEPHONE (OUTPATIENT)
Age: 64
End: 2024-06-20

## 2024-06-20 NOTE — TELEPHONE ENCOUNTER
Emilia  from [facility] has been having trouble contacting the patient at both phone numbers in chart.

## 2024-07-08 DIAGNOSIS — F41.9 ANXIETY: ICD-10-CM

## 2024-07-08 RX ORDER — ALPRAZOLAM 0.5 MG/1
0.5 TABLET ORAL 2 TIMES DAILY PRN
Qty: 45 TABLET | Refills: 0 | Status: SHIPPED | OUTPATIENT
Start: 2024-07-08

## 2024-07-08 NOTE — TELEPHONE ENCOUNTER
Last seen 5/31/24  Has appt 8/30/24     1 GERMAN SERRANO JR 1960 M 75 YEATS [id number] [place]   2 GERMAN SERRANO 1960 M 75 YEATS [id number] [facility]      Search Criteria  NAME DATE OF BIRTH DATE RANGE  German Serrano 1960 07- To 07-  REQUESTER NAME REQUESTED DATE  ALEXA ORTEGA 07- 17:31:47 ([facility])  Summary  Prescriptions  21  Prescribers  8  Pharmacies  1  Drug Classes  Benzodiazepines  14  Stimulants  0  Opioids  7  Muscle Relaxants  0  Opioid Dosage  Total MME for Active Prescriptions  90    Average MME  96.19         Prescriptions  Notifications  2  Prescribers  Pharmacies  MME Graph    Show All     [place]   Drug Categories:      Opioids       Benzodiazepines     Show  10  entries  Search:  PATIENT ID PRESCRIPTION # FILLED WRITTEN DRUG LABEL QTY DAYS STRENGTH MME** PRESCRIBER PHARMACY PAYMENT REFILL #/AUTH STATE DETAIL  1 [id number] 07/05/2024 07/05/2024 oxyCODONE HCL (Tablet) 42.0 7 10 MG 90.0 ENRICO [facility] [place] 0 / 0 [place]   1 [id number] 06/29/2024 06/28/2024 oxyCODONE HCL (Tablet) 42.0 7 10 MG 90.0 LESLEE SEIP [email or web address] [employer] Private Pay 0 / 0 [place]   1 [id number] 06/24/2024 06/21/2024 oxyCODONE HCL (Tablet) 42.0 7 10 MG 90.0 ALEX [facility] Private Pay 0 / 0 [place]   1 [id number] 06/17/2024 06/17/2024 oxyCODONE HCL (Tablet) 42.0 7 10 MG 90.0 ALEX [facility] Private Pay 0 / 0 [place]   1 [id number] 06/13/2024 06/12/2024 ALPRAZolam (Tablet) 45.0 22 0.5 MG NA MARIANNA) [email or web address] Commercial Insurance 0 / 0 [place]   1 [id number] 06/11/2024 06/10/2024 oxyCODONE HCL (Tablet) 42.0 7 10 MG 90.0 ALEX [employer] [facility] Private Pay 0 / 0 [place]   1 [id number] 06/05/2024 06/05/2024 oxyCODONE HCL (Tablet) 42.0 30 10 MG 21.0 LANA WOLF [email or web address] Private Pay 0 / 0 [place]   1 [id number] 06/03/2024 06/03/2024 diazePAM (Tablet) 2.0 1 10 MG NA SHAWANDA CARMINE [email or web address] Commercial Insurance 0 / 0 [place]   1 [id number] 05/16/2024 04/25/2024 ALPRAZolam (Tablet) 45.0 22 0.5 MG NA MARIANNA)  [facility] [employer] [facility] [employer] Commercial Insurance 1 / 1 [place]   1 [id number] 04/25/2024 04/25/2024 ALPRAZolam (Tablet) 45.0 22 0.5 MG NA MARIANNA) [facility] [employer] [facility]

## 2024-07-08 NOTE — TELEPHONE ENCOUNTER
Medication: Alprazolam    Dose/Frequency: 0.5 mg    Quantity: 45    Pharmacy: [facility]     Office:   [x] PCP/Provider -   [] Speciality/Provider -     Does the patient have enough for 3 days?   [] Yes   [x] No - Send as HP to POD

## 2024-07-29 DIAGNOSIS — I63.89 CEREBROVASCULAR ACCIDENT (CVA) DUE TO OTHER MECHANISM (HCC): ICD-10-CM

## 2024-07-29 RX ORDER — AMLODIPINE BESYLATE 10 MG/1
10 TABLET ORAL DAILY
Qty: 90 TABLET | Refills: 1 | Status: SHIPPED | OUTPATIENT
Start: 2024-07-29

## 2024-07-31 ENCOUNTER — TELEPHONE (OUTPATIENT)
Age: 64
End: 2024-07-31

## 2024-07-31 NOTE — TELEPHONE ENCOUNTER
As per Lynsey chart message patient was seen by Neurosurgeon yesterday and was checked on his blood pressure.    While sitting and standing both the readings were same 128/73.    So therefore patient would want to continue with Amlodipine 5 mg instead of 10 mg.    Please do advise the patient by an return call.    Thanks

## 2024-08-05 DIAGNOSIS — I63.89 CEREBROVASCULAR ACCIDENT (CVA) DUE TO OTHER MECHANISM (HCC): ICD-10-CM

## 2024-08-05 DIAGNOSIS — F41.9 ANXIETY: ICD-10-CM

## 2024-08-05 RX ORDER — ALPRAZOLAM 0.5 MG/1
0.5 TABLET ORAL 2 TIMES DAILY PRN
Qty: 45 TABLET | Refills: 0 | Status: SHIPPED | OUTPATIENT
Start: 2024-08-05

## 2024-08-05 RX ORDER — AMLODIPINE BESYLATE 5 MG/1
5 TABLET ORAL DAILY
Qty: 90 TABLET | Refills: 1 | Status: SHIPPED | OUTPATIENT
Start: 2024-08-05

## 2024-08-05 NOTE — TELEPHONE ENCOUNTER
Pt requesting refill on Xanax  Last seen 5/31/24  Has appt 8/30/24     1 GERMAN SERRANO JR 1960 M 75 YEATS RUN-[id number] [place]   2 GERMAN SERRANO 1960 M 75 YEATS RUN-[id number] [place]      Search Criteria  Name Date of Birth Date Range  German Serrano 1960 08- To 08-  Requester Name Requested Date  ALEXA ORTEGA 08- 13:19:32 ([facility])  Summary  Prescriptions  27  Prescribers  8  Pharmacies  1  Drug Classes  Benzodiazepines  15  Stimulants  0  Opioids  12  Muscle Relaxants  0  Opioid Dosage  Total MME for Active Prescriptions  57.27    Average MME  92.57         Prescriptions  Notifications  2  Prescribers  Pharmacies  MME Graph    Show All     [place]   Drug Categories:      Opioids       Benzodiazepines     Show  10  entries  Search:  Patient Id Prescription # Filled Written Drug Label Qty Days Strength MME** Prescriber Pharmacy Payment REFILL #/Auth State Detail  1 [id number] 07/30/2024 07/29/2024 oxyCODONE HCL (Tablet) 84.0 11 5 MG 57.27 [facility] Private Pay 0 / 0 [place]   1 [id number] 07/29/2024 07/29/2024 HYDROcodone BITARTRATE 5 MG ORAL TABLET/ACETAMINOPHEN 325 MG (Tablet) 4.0 2 325 MG-5 MG 10.0 ALEX DOS [facility] Commercial Insurance 0 / 0 [place]   1 [id number] 07/22/2024 07/22/2024 oxyCODONE HCL (Tablet) 84.0 10 5 MG 63.0 LESLEE SEIP [facility] Private Pay 0 / 0 [place]   1 [id number] 07/16/2024 07/11/2024 oxyCODONE HCL (Tablet) 42.0 7 10 MG 90.0 LESLEE SEIP [facility] Private Pay 0 / 0 [place]   1 [id number] 07/11/2024 07/11/2024 oxyCODONE HCL (Tablet) 84.0 7 5 MG 90.0 LESLEE SEIP [facility] Private Pay 0 / 0 [place]   1 [id number] 07/09/2024 07/08/2024 ALPRAZolam (Tablet) 45.0 22 0.5 MG [facility]CIE CABRAL[facility] [place] [facility] 0 / 0 [place]   1 [id number] 07/05/2024 07/05/2024 oxyCODONE HCL (Tablet) 42.0 7 10 MG 90.0 ENRICO [facility] Private Pay 0 / 0 [place]   1 [id number] 06/29/2024 06/28/2024 oxyCODONE HCL (Tablet) 42.0 7 10 MG 90.0 VIKTORIA [facility] Private Pay 0 /  0 [place]   1 [id number] 06/24/2024 06/21/2024 oxyCODONE HCL (Tablet) 42.0 7 10 MG 90.0 ALEX [facility] Private Pay 0 / 0 [place]   1 [id number] 06/17/2024 06/17/2024 oxyCODONE HCL (Tablet) 42.0 7 10 MG

## 2024-08-10 DIAGNOSIS — N40.0 BENIGN PROSTATIC HYPERPLASIA, UNSPECIFIED WHETHER LOWER URINARY TRACT SYMPTOMS PRESENT: ICD-10-CM

## 2024-08-10 RX ORDER — TAMSULOSIN HYDROCHLORIDE 0.4 MG/1
0.4 CAPSULE ORAL
Qty: 90 CAPSULE | Refills: 0 | Status: SHIPPED | OUTPATIENT
Start: 2024-08-10

## 2024-08-16 ENCOUNTER — TELEPHONE (OUTPATIENT)
Age: 64
End: 2024-08-16

## 2024-08-16 NOTE — TELEPHONE ENCOUNTER
Patient called the office asking if he has active order in the chart. Pt was advised that he has active lab orders no further questions

## 2024-09-03 DIAGNOSIS — F41.9 ANXIETY: ICD-10-CM

## 2024-09-03 RX ORDER — ALPRAZOLAM 0.5 MG
0.5 TABLET ORAL 2 TIMES DAILY PRN
Qty: 45 TABLET | Refills: 0 | Status: SHIPPED | OUTPATIENT
Start: 2024-09-03

## 2024-09-03 NOTE — TELEPHONE ENCOUNTER
Last seen 5/31/24  Has appt 9/17/24     1 GERMAN SERRANO JR 1960 M 75 YEATS RUN-[id number] [place]   2 GERMAN SERRANO 1960 M 75 YEATS RUN-[id number] [facility]      Search Criteria  Name Date of Birth Date Range  German Serrano 1960 09- To 09-  Requester Name Requested Date  ALEXA ORTEGA 09- 18:40:36 ([facility])  Summary  Prescriptions  31  Prescribers  8  Pharmacies  1  Drug Classes  Benzodiazepines  16  Stimulants  0  Opioids  15  Muscle Relaxants  0  Opioid Dosage  Total MME for Active Prescriptions  57.27    Average MME  84.46         Prescriptions  Notifications  1  Prescribers  Pharmacies  MME Graph    Show All     [place]   Drug Categories:      Opioids       Benzodiazepines     Show  10  entries  Search:  Patient Id Prescription # Filled Written Drug Label Qty Days Strength MME** Prescriber Pharmacy Payment REFILL #/Auth State Detail  1 [id number] 08/28/2024 08/28/2024 oxyCODONE HCL (Tablet) 84.0 11 5 MG 57.27 ALEX [facility] Private Pay 0 / 0 [place]   1 [id number] 08/19/2024 08/16/2024 oxyCODONE HCL (Tablet) 84.0 11 5 MG 57.27 LESLEE SEIP [facility] Private Pay 0 / 0 [place]   1 [id number] 08/08/2024 08/08/2024 oxyCODONE HCL (Tablet) 84.0 11 5 MG 57.27 [facility] Private Pay 0 / 0 [place]   1 [id number] 08/06/2024 08/05/2024 ALPRAZolam (Tablet) 45.0 22 0.5 MG DARCIE CABRAL) [facility] [employer] Commercial Insurance 0 / 0 [place]   1 [id number] 07/30/2024 07/29/2024 oxyCODONE HCL (Tablet) 84.0 11 5 MG 57.27 LESLEE SEIP [facility] Private Pay 0 / 0 [place]   1 [id number] 07/29/2024 07/29/2024 HYDROcodone BITARTRATE 5 MG ORAL TABLET/ACETAMINOPHEN 325 MG (Tablet) 4.0 2 325 MG-5 MG 10.0 ALEX [facility] [employer] Commercial Insurance 0 / 0 [place]   1 [id number] 07/22/2024 07/22/2024 oxyCODONE HCL (Tablet) 84.0 10 5 MG 63.0 LESLEE SEIP [facility] Private Pay 0 / 0 [place]   1 [id number] 07/16/2024 07/11/2024 oxyCODONE HCL (Tablet) 42.0 7 10 MG 90.0 LESLEE SEIP [facility] Private Pay 0 /  0 [place]   1 [id number] 07/11/2024 07/11/2024 oxyCODONE HCL (Tablet) 84.0 7 5 MG 90.0 LESLEE SEIP [facility] Private Pay 0 / 0 [place]   1 [id number] 07/09/2024 07/08/2024 ALPRAZolam (Tablet) 45.0 22 0.5 MG NA [facility]IANNA) [facility] [place] [facility] 0 / 0 [place]

## 2024-09-03 NOTE — TELEPHONE ENCOUNTER
Medication:  ALPRAZolam (XANAX) 0.5 mg tablet    Dose/Frequency: Take 1 tablet (0.5 mg total) by mouth 2 (two) times a day as needed for anxiety     Quantity: 45 tablet     Pharmacy:   [facility] [place] [address] [phone]        Office:   [x] PCP/Provider - Rory Herring MD   [] Speciality/Provider -     Does the patient have enough for 3 days?   [] Yes   [x] No - Send as HP to POD

## 2024-09-13 ENCOUNTER — TELEPHONE (OUTPATIENT)
Age: 64
End: 2024-09-13

## 2024-09-13 NOTE — TELEPHONE ENCOUNTER
Patient called in to see if he should have labs done prior to his upcoming appointment. Please advise

## 2024-09-16 DIAGNOSIS — Z79.4 TYPE 2 DIABETES MELLITUS WITHOUT COMPLICATION, WITH LONG-TERM CURRENT USE OF INSULIN (HCC): Primary | ICD-10-CM

## 2024-09-16 DIAGNOSIS — E11.9 TYPE 2 DIABETES MELLITUS WITHOUT COMPLICATION, WITH LONG-TERM CURRENT USE OF INSULIN (HCC): Primary | ICD-10-CM

## 2024-09-17 ENCOUNTER — OFFICE VISIT (OUTPATIENT)
Dept: FAMILY MEDICINE CLINIC | Facility: CLINIC | Age: 64
End: 2024-09-17
Payer: COMMERCIAL

## 2024-09-17 VITALS
RESPIRATION RATE: 16 BRPM | OXYGEN SATURATION: 98 % | HEIGHT: 74 IN | WEIGHT: 209.2 LBS | TEMPERATURE: 97.6 F | SYSTOLIC BLOOD PRESSURE: 136 MMHG | DIASTOLIC BLOOD PRESSURE: 86 MMHG | BODY MASS INDEX: 26.85 KG/M2 | HEART RATE: 115 BPM

## 2024-09-17 DIAGNOSIS — I10 ESSENTIAL HYPERTENSION: ICD-10-CM

## 2024-09-17 DIAGNOSIS — B35.4 TINEA CORPORIS: ICD-10-CM

## 2024-09-17 DIAGNOSIS — Z79.4 TYPE 2 DIABETES MELLITUS WITHOUT COMPLICATION, WITH LONG-TERM CURRENT USE OF INSULIN (HCC): ICD-10-CM

## 2024-09-17 DIAGNOSIS — Z00.01 ABNORMAL WELLNESS EXAM: ICD-10-CM

## 2024-09-17 DIAGNOSIS — E11.29 TYPE 2 DIABETES MELLITUS WITH MICROALBUMINURIA, WITH LONG-TERM CURRENT USE OF INSULIN (HCC): Primary | ICD-10-CM

## 2024-09-17 DIAGNOSIS — R80.9 TYPE 2 DIABETES MELLITUS WITH MICROALBUMINURIA, WITH LONG-TERM CURRENT USE OF INSULIN (HCC): Primary | ICD-10-CM

## 2024-09-17 DIAGNOSIS — F41.9 ANXIETY: ICD-10-CM

## 2024-09-17 DIAGNOSIS — I63.89 CEREBROVASCULAR ACCIDENT (CVA) DUE TO OTHER MECHANISM (HCC): ICD-10-CM

## 2024-09-17 DIAGNOSIS — Z79.4 TYPE 2 DIABETES MELLITUS WITH MICROALBUMINURIA, WITH LONG-TERM CURRENT USE OF INSULIN (HCC): Primary | ICD-10-CM

## 2024-09-17 DIAGNOSIS — E78.49 OTHER HYPERLIPIDEMIA: ICD-10-CM

## 2024-09-17 DIAGNOSIS — E11.9 TYPE 2 DIABETES MELLITUS WITHOUT COMPLICATION, WITH LONG-TERM CURRENT USE OF INSULIN (HCC): ICD-10-CM

## 2024-09-17 PROBLEM — E11.42 DIABETIC POLYNEUROPATHY ASSOCIATED WITH TYPE 2 DIABETES MELLITUS (HCC): Status: RESOLVED | Noted: 2024-04-16 | Resolved: 2024-09-17

## 2024-09-17 PROBLEM — I95.1 ORTHOSTATIC HYPOTENSION: Status: RESOLVED | Noted: 2024-02-21 | Resolved: 2024-09-17

## 2024-09-17 PROCEDURE — 99396 PREV VISIT EST AGE 40-64: CPT | Performed by: FAMILY MEDICINE

## 2024-09-17 PROCEDURE — 99214 OFFICE O/P EST MOD 30 MIN: CPT | Performed by: FAMILY MEDICINE

## 2024-09-17 RX ORDER — METHOCARBAMOL 750 MG/1
750 TABLET, FILM COATED ORAL 4 TIMES DAILY PRN
COMMUNITY
Start: 2024-09-16

## 2024-09-17 RX ORDER — CLOTRIMAZOLE AND BETAMETHASONE DIPROPIONATE 10; .64 MG/G; MG/G
CREAM TOPICAL 2 TIMES DAILY
Qty: 45 G | Refills: 1 | Status: SHIPPED | OUTPATIENT
Start: 2024-09-17

## 2024-09-17 NOTE — ASSESSMENT & PLAN NOTE
He was given prescription for Lotrisone cream.    Orders:    clotrimazole-betamethasone (LOTRISONE) 1-0.05 % cream; Apply topically 2 (two) times a day

## 2024-09-17 NOTE — PROGRESS NOTES
Ambulatory Visit  Name: German Louie      : 1960      MRN: [id number]  Encounter Provider: Rory Herring MD  Encounter Date: 2024   Encounter department: [facility] [address] [facility]    Assessment & Plan  Type 2 diabetes mellitus with microalbuminuria, with long-term current use of insulin ([facility])  A1c is well-controlled at 6.7.  Continue same and we will continue to monitor fasting glucose and A1c.  Lab Results   Component Value Date    HGBA1C 6.7 (H) 2024            Tinea corporis  He was given prescription for Lotrisone cream.    Orders:    clotrimazole-betamethasone (LOTRISONE) 1-0.05 % cream; Apply topically 2 (two) times a day    Type 2 diabetes mellitus without complication, with long-term current use of insulin (HCC)    Lab Results   Component Value Date    HGBA1C 6.7 (H) 2024       Orders:    Hemoglobin A1C; Future    Comprehensive metabolic panel; Future    CBC and differential; Future    Lipid Panel with Direct LDL reflex; Future    TSH, 3rd generation with Free T4 reflex; Future    Essential hypertension  Well-controlled.  Continue same.  Will continue to monitor.         Other hyperlipidemia  Well controlled on atorvastatin and fenofibrate. Will continue to monitor.         Anxiety  Stable.  Continue same.  Will continue to monitor.       Abnormal wellness exam  It was discussed about immunizations,diet ,exercise and safety measures.         Cerebrovascular accident (CVA) due to other mechanism (HCC)  Continue same medications for now.  Continue to follow-up with neurologist.              History of Present Illness     Is here today for follow-up multiple medical problems.  He has been taking his medication.  He denies any side effect to his medication.  He is due for blood work.  He complaining of pain in his lower extremities and he has been following with pain management.  He was sent for some vascular testing to check for peripheral vascular disease.  He  complained of rash on his back has been going on for a while now.  He stated chest pain going on for a year and has been itchy.    Diabetes  Pertinent negatives for hypoglycemia include no dizziness or headaches. Pertinent negatives for diabetes include no chest pain.   Hyperlipidemia  Pertinent negatives include no chest pain or shortness of breath.   Hypertension  Pertinent negatives include no chest pain, headaches, palpitations or shortness of breath.   Rash  Pertinent negatives include no cough, fever, shortness of breath or vomiting.     Review of Systems   Constitutional:  Negative for chills and fever.   HENT:  Negative for trouble swallowing.    Eyes:  Negative for visual disturbance.   Respiratory:  Negative for cough and shortness of breath.    Cardiovascular:  Negative for chest pain and palpitations.   Gastrointestinal:  Negative for abdominal pain, blood in stool and vomiting.   Endocrine: Negative for cold intolerance and heat intolerance.   Genitourinary:  Negative for difficulty urinating and dysuria.   Musculoskeletal:  Negative for gait problem.   Skin:  Positive for rash.   Neurological:  Negative for dizziness, syncope and headaches.   Hematological:  Negative for adenopathy.   Psychiatric/Behavioral:  Negative for behavioral problems.      Past Medical History:   Diagnosis Date    Diabetes mellitus (HCC)     HLD (hyperlipidemia)     Hypertension      Past Surgical History:   Procedure Laterality Date    ANKLE SURGERY Right     BACK SURGERY      L4-L5    NECK SURGERY       Family History   Problem Relation Age of Onset    Diabetes Mother     Diabetes Father     Heart disease Maternal Grandfather     Heart disease Paternal Grandfather      Social History     Tobacco Use    Smoking status: Every Day     Current packs/day: 1.00     Average packs/day: 1 pack/day for 24.7 years (24.7 ttl pk-yrs)     Types: Cigarettes     Start date: 2000    Smokeless tobacco: Never   Vaping Use    Vaping status:  Never Used   Substance and Sexual Activity    Alcohol use: Yes     Comment: rare    Drug use: Never    Sexual activity: Not Currently     Current Outpatient Medications on File Prior to Visit   Medication Sig    Accu-Chek Guide test strip     acetaminophen (TYLENOL) 500 mg tablet Take 500 mg by mouth every 4 (four) hours as needed    Alcohol Swabs PADS As needed    ALPRAZolam (XANAX) 0.5 mg tablet Take 1 tablet (0.5 mg total) by mouth 2 (two) times a day as needed for anxiety    amLODIPine (NORVASC) 5 mg tablet Take 1 tablet (5 mg total) by mouth daily    Blood Glucose Monitoring Suppl (Accu-Chek Guide) w/Device KIT     Cholecalciferol 50 MCG (2000 UT) CAPS Take 1 capsule by mouth daily    fenofibrate micronized (LOFIBRA) 134 MG capsule Take 1 capsule (134 mg total) by mouth daily with breakfast    gabapentin (NEURONTIN) 300 mg capsule Take 1 capsule (300 mg total) by mouth 2 (two) times a day    GNP Alcohol Swabs 70 % PADS     insulin glargine (Toujeo Max SoloStar) 300 units/mL CONCENTRATED U-300 injection pen (2-unit dial) Inject 34 Units under the skin daily at bedtime (Patient taking differently: Inject 32 Units under the skin daily at bedtime)    Insulin Pen Needle (Pen Needles) 33G X 4 MM MISC Use daily with Toujeo    lisinopril (ZESTRIL) 5 mg tablet TAKE 1 TABLET (5 MG TOTAL) BY MOUTH DAILY    melatonin 3 mg Take 12 mg by mouth    methocarbamol (ROBAXIN) 750 mg tablet Take 750 mg by mouth 4 (four) times a day as needed    tamsulosin (FLOMAX) 0.4 mg TAKE 1 CAPSULE (0.4 MG TOTAL) BY MOUTH DAILY WITH DINNER    aspirin (ECOTRIN LOW STRENGTH) 81 mg EC tablet Take 81 mg by mouth daily    atorvastatin (LIPITOR) 40 mg tablet Take 1 tablet (40 mg total) by mouth daily    oxyCODONE (ROXICODONE) 5 immediate release tablet  (Patient not taking: Reported on 9/26/2023)    triamcinolone (KENALOG) 0.1 % cream Apply topically 2 (two) times a day (Patient not taking: Reported on 9/12/2023)    [DISCONTINUED] ondansetron  "(ZOFRAN) 4 mg tablet Take 1 tablet (4 mg total) by mouth every 8 (eight) hours as needed for nausea or vomiting    [DISCONTINUED] pantoprazole (PROTONIX) 40 mg tablet Take 1 tablet (40 mg total) by mouth daily before breakfast     Allergies   Allergen Reactions    Grass Pollen(K-O-R-T-Swt Jairo) Other (See Comments)     Sneezing, congestion    Zolpidem Other (See Comments)     severe drowsiness     Immunization History   Administered Date(s) Administered    COVID-19 MODERNA VACC 0.5 ML IM 04/26/2021, 05/24/2021, 12/27/2021    INFLUENZA 10/10/2023    Influenza, injectable, quadrivalent, preservative free 0.5 mL 10/10/2023    Respiratory Syncytial Virus Vaccine (Recombinant) 11/21/2023    Tdap 12/01/2020     Objective     /86 (BP Location: Left arm, Patient Position: Sitting, Cuff Size: Standard)   Pulse (!) 115   Temp 97.6 °F (36.4 °C)   Resp 16   Ht 6' 2\" (1.88 m)   Wt 94.9 kg (209 lb 3.2 oz)   SpO2 98%   BMI 26.86 kg/m²     Physical Exam  Vitals and nursing note reviewed.   Constitutional:       Appearance: He is well-developed.   HENT:      Head: Normocephalic and atraumatic.   Eyes:      Pupils: Pupils are equal, round, and reactive to light.   Cardiovascular:      Rate and Rhythm: Normal rate and regular rhythm.      Heart sounds: Normal heart sounds.   Pulmonary:      Effort: Pulmonary effort is normal.      Breath sounds: Normal breath sounds.   Abdominal:      General: Bowel sounds are normal.      Palpations: Abdomen is soft.   Musculoskeletal:      Cervical back: Normal range of motion and neck supple.   Lymphadenopathy:      Cervical: No cervical adenopathy.   Skin:     General: Skin is warm.      Findings: Rash (mid back) present. Rash is macular.          Neurological:      Mental Status: He is alert and oriented to person, place, and time.         "

## 2024-09-17 NOTE — PROGRESS NOTES
Diabetic Foot Exam    Patient's shoes and socks removed.    Right Foot/Ankle   Right Foot Inspection  Skin Exam: skin normal and skin intact. No dry skin, no warmth, no callus, no erythema, no maceration, no abnormal color, no pre-ulcer, no ulcer and no callus.     Toe Exam: ROM and strength within normal limits.     Sensory   Monofilament testing: intact    Vascular  Capillary refills: < 3 seconds  The right DP pulse is 1+.     Left Foot/Ankle  Left Foot Inspection  Skin Exam: skin normal and skin intact. No dry skin, no warmth, no erythema, no maceration, normal color, no pre-ulcer, no ulcer and no callus.     Toe Exam: ROM and strength within normal limits.     Sensory   Monofilament testing: diminished    Vascular  Capillary refills: < 3 seconds  The left DP pulse is 1+.     Assign Risk Category  No deformity present  Loss of protective sensation  Weak pulses  Risk: 2

## 2024-09-17 NOTE — ASSESSMENT & PLAN NOTE
A1c is well-controlled at 6.7.  Continue same and we will continue to monitor fasting glucose and A1c.  Lab Results   Component Value Date    HGBA1C 6.7 (H) 02/09/2024

## 2024-09-30 DIAGNOSIS — F41.9 ANXIETY: ICD-10-CM

## 2024-09-30 NOTE — TELEPHONE ENCOUNTER
Last seen 9/17/24     1 GERMAN SERRANO JR 1960 M 75 YEATS [id number] [facility]   2 GERMAN SERRANO 1960 M 75 YEATS [id number] [facility]      Search Criteria  Name Date of Birth Date Range  German Serrano 1960 10- To 09-  Requester Name Requested Date  [facility] 09- 16[facility]22[facility]34 ([facility])  Summary  Prescriptions  32  Prescribers  6  Pharmacies  1  Drug Classes  Benzodiazepines  16  Stimulants  0  Opioids  16  Muscle Relaxants  0  Opioid Dosage  Total MME for Active Prescriptions  0    Average MME  83.07         Prescriptions  Notifications  1  Prescribers  Pharmacies  MME Graph    Show All     [place]   Drug Categories:      Opioids       Benzodiazepines     Show  10  entries  Search:  Patient Id Prescription # Filled Written Drug Label Qty Days Strength MME** Prescriber Pharmacy Payment REFILL #/Auth State Detail  1 [id number] 09/16/2024 09/16/2024 oxyCODONE HCL (Tablet) 84.0 11 5 MG 57.27 ALEX DOS SANTOS [facility] Private Pay 0 / 0 [place]   1 [id number] 09/06/2024 09/06/2024 oxyCODONE HCL (Tablet) 84.0 11 5 MG 57.27 ALEX DOS SANTOS [facility] Private Pay 0 / 0 [place]   1 [id number] 09/04/2024 09/03/2024 ALPRAZolam (Tablet) 45.0 22 0.5 MG [facility]E [facility]AL) [facility] 0 / 0 [place]   1 [id number] 08/28/2024 08/28/2024 oxyCODONE HCL (Tablet) 84.0 11 5 MG 57.27 ALEX DOS SANTOS [facility] Private Pay 0 / 0 [place]   1 [id number] 08/19/2024 08/16/2024 oxyCODONE HCL (Tablet) 84.0 11 5 MG 57.27 LESLEE SEIP [facility] Private Pay 0 / 0 [place]   1 [id number] 08/08/2024 08/08/2024 oxyCODONE HCL (Tablet) 84.0 11 5 MG 57.27 LESLEE SEIP [facility] Private Pay 0 / 0 [place]   1 [id number] 08/06/2024 08/05/2024 ALPRAZolam (Tablet) 45.0 22 0.5 MG NA MARIANNA) LYDIAMATEO [facility] Commercial Insurance 0 / 0 [place]   1 [id number] 07/30/2024 07/29/2024 oxyCODONE HCL (Tablet) 84.0 11 5 MG 57.27 LESLEE  [facility] Private Pay 0 / 0 [place]   1 [id number] 07/29/2024 07/29/2024 HYDROcodone BITARTRATE 5 MG ORAL TABLET/ACETAMINOPHEN 325 MG  (Tablet) 4.0 2 325 MG-5 MG 10.0 ALEX DOS SANTOS [facility] Commercial Insurance 0 / 0 [place]   1 [id number] 07/22/2024 07/22/2024 oxyCODONE HCL (Tablet) 84.0 10 5 MG 63.0 LESLEE SEIP [facility] Private Pay 0 / 0 [place]

## 2024-09-30 NOTE — TELEPHONE ENCOUNTER
Reason for call:   [x] Refill   [] Prior Auth  [] Other:     Office:   [x] PCP/Provider - Rory Herring,   [] Specialty/Provider -     Medication: ALPRAZolam (XANAX) 0.5 mg    Dose/Frequency:  Take 1 tablet (0.5 mg total) by mouth 2 (two) times a day as needed for anxiety     Quantity: 45    [place] [facility] [place] [address]        Does the patient have enough for 3 days?   [] Yes   [x] No - Send as HP to POD

## 2024-10-01 RX ORDER — ALPRAZOLAM 0.5 MG
0.5 TABLET ORAL 2 TIMES DAILY PRN
Qty: 45 TABLET | Refills: 0 | Status: SHIPPED | OUTPATIENT
Start: 2024-10-01

## 2024-10-28 DIAGNOSIS — F41.9 ANXIETY: ICD-10-CM

## 2024-10-28 RX ORDER — ALPRAZOLAM 0.5 MG
0.5 TABLET ORAL 2 TIMES DAILY PRN
Qty: 45 TABLET | Refills: 0 | Status: SHIPPED | OUTPATIENT
Start: 2024-10-28

## 2024-10-28 NOTE — TELEPHONE ENCOUNTER
Reason for call:   [x] Refill   [] Prior Auth  [] Other:      Office:   [x] PCP/Provider -  Rory Faust,   [] Specialty/Provider -      Medication: ALPRAZolam (XANAX) 0.5 mg     Dose/Frequency:  Take 1 tablet (0.5 mg total) by mouth 2 (two) times a day as needed for anxiety      Quantity: 45     [place]: [facility] [place], [address]           Does the patient have enough for 3 days?   [] Yes   [x] No - Send as HP to POD

## 2024-10-28 NOTE — TELEPHONE ENCOUNTER
Pt last seen 24 and I copied past refills into chart from pdmp website and sent to provider for approval    tients  Select Patient Id Name  [facility] State   1 DIONTE SERRANO JR 1960 M 75 YEATS [id number] [place]   2 DIONTE SERRANO 1960 M 75 YEATS [id number] [facility]      Search Criteria  Name Date of Birth Date Range  dionte serrano 1960 10- To 10-  Requester Name Requested Date  ALEXA ORTEGA 10- 16:48:12 ([facility])  Summary  Prescriptions  32  Prescribers  6  Pharmacies  1  Drug Classes  Benzodiazepines  16  Stimulants  0  Opioids  16  Muscle Relaxants  0  Opioid Dosage  Total MME for Active Prescriptions  0    Average MME  75.02         Prescriptions  Notifications  1  Prescribers  Pharmacies  MME Graph    Show All     [place]   Drug Categories:      Benzodiazepines       Opioids     Show  10  entries  Search:  Patient Id Prescription # Filled Written Drug Label Qty Days Strength MME** Prescriber Pharmacy Payment REFILL #/Auth State Detail  1 [id number] 10/02/2024 10/01/2024 ALPRAZolam (Tablet) 45.0 22 0.5 MG [facility] [place] / 0 [place]   1 [id number] 2024 oxyCODONE HCL (Tablet) 84.0 11 5 MG 57.27 ALEX DOS SANTOS [facility] Private Pay 0 / 0 [place]   1 [id number] 2024 oxyCODONE HCL (Tablet) 84.0 11 5 MG 57.27 ALEX DOS SANTOS [facility] [place] 0 / 0 [place]   1 [id number] 2024 ALPRAZolam (Tablet) 45.0 22 0.5 MG [facility]CIE CABRAL) [facility] [place] 0 / 0 [place]   1 [id number] 2024 oxyCODONE HCL (Tablet) 84.0 11 5 MG 57.27 ALEX DOS SANTOS [facility] Private Pay 0 / 0 [place]   1 [id number] 2024 oxyCODONE HCL (Tablet) 84.0 11 5 MG 57.27 LESLEE SEIP [facility] Private Pay 0 / 0 [place]   1 [id number] 2024 oxyCODONE HCL (Tablet) 84.0 11 5 MG 57.27 LESLEEORIA SE[facility] Private Pay 0 / 0 [place]   1 [id number] 2024 ALPRAZolam  (Tablet) 45.0 22 0.5 MG NA MARIANNA) ABOSAMRA [employer] [facility] [employer] Commercial Insurance 0 / 0 [place]   1 [id number] 07/30/2024 07/29/2024 oxyCODONE HCL (Tablet) 84.0 11 5 MG 57.27 LESLEE SEIP [employer] [facility] [employer] Private Pay 0 / 0 [place]   1 [id number] 07/29/2024 07/29/2024 HYDROcodone BITARTRATE 5 MG ORAL TABLET/ACETAMINOPHEN 325 MG (Tablet) 4.0 2 325 MG-5 MG 10.0 ALEX LEVON [employer] [facility] [employer] Commercial Insurance 0 / 0 [place]   Showing 1 to 10 of 32 [id number]    * Per CDC guidance, the conversion factors and associated daily morphine milligram equivalents for drugs prescribed as part of medication-assisted treatment for opioid use disorder should not be used to benchmark against dosage thresholds meant for opioids prescribed for pain.    Report Disclaimer:    Information from the [place] Prescription Drug Monitoring Program (PDMP) database is protected health information and any information accessed must be treated as confidential. Any person who knowingly or intentionally makes an unauthorized disclosure of information from the PDMP database will be subject to civil and criminal penalties as set forth in the ABC-MAP Act 191 of 2014; Act of Oct. 27, 2014, P.L. 2911, No. 191.    The information in the PDMP database is submitted by pharmacies and may contain errors and omissions. Additionally, pharmacies may submit extraneous non-controlled substance dispensations to the PDMP database; if a non-controlled substance is present on one patient’s Prescription Report, it should not be assumed that this same medication will be reported on another patient’s Prescription Report. Independent verification of prescription information with pharmacies and prescribers may sometimes be prudent or necessary.    Educational content  CDC MME calculation guidelines  [place] Prescribing Guidelines  Letter Regarding the Misapplication of Prescribing Guidelines  Guide for Appropriate Tapering or Discontinuation of Long-Term Opioid  Use  #[id number]

## 2024-11-05 DIAGNOSIS — N40.0 BENIGN PROSTATIC HYPERPLASIA, UNSPECIFIED WHETHER LOWER URINARY TRACT SYMPTOMS PRESENT: ICD-10-CM

## 2024-11-05 NOTE — TELEPHONE ENCOUNTER
Pt called, requesting refill on the following medication:    Medication: tamsulosin (FLOMAX) 0.4 mg     Dose/Frequency: TAKE 1 CAPSULE (0.4 MG TOTAL) BY MOUTH DAILY WITH DINNER     Quantity: 90 capsule    [place] [facility] [place] [address] [facility]     Office:   [x] PCP/Provider - Rory Herring MD   [] Speciality/Provider -     Does the patient have enough for 3 days?   [x] Yes   [] No - Send as HP to POD

## 2024-11-06 RX ORDER — TAMSULOSIN HYDROCHLORIDE 0.4 MG/1
0.4 CAPSULE ORAL
Qty: 90 CAPSULE | Refills: 1 | Status: SHIPPED | OUTPATIENT
Start: 2024-11-06

## 2024-11-19 DIAGNOSIS — I63.89 CEREBROVASCULAR ACCIDENT (CVA) DUE TO OTHER MECHANISM (HCC): ICD-10-CM

## 2024-11-19 DIAGNOSIS — F41.9 ANXIETY: ICD-10-CM

## 2024-11-19 RX ORDER — AMLODIPINE BESYLATE 5 MG/1
5 TABLET ORAL DAILY
Qty: 90 TABLET | Refills: 0 | Status: SHIPPED | OUTPATIENT
Start: 2024-11-19

## 2024-11-19 RX ORDER — LISINOPRIL 5 MG/1
5 TABLET ORAL DAILY
Qty: 90 TABLET | Refills: 0 | Status: SHIPPED | OUTPATIENT
Start: 2024-11-19 | End: 2025-02-17

## 2024-11-20 RX ORDER — ALPRAZOLAM 0.5 MG
0.5 TABLET ORAL 2 TIMES DAILY PRN
Qty: 45 TABLET | Refills: 0 | Status: SHIPPED | OUTPATIENT
Start: 2024-11-20

## 2024-11-20 NOTE — TELEPHONE ENCOUNTER
Requesting refill on xanax  Last seen 9/17/24  Has appt 3/25/25     1 GERMAN SERRANO JR 1960 M 75 YEATS [id number] [place]   2 GERMAN SERRANO 1960 M 75 YEATS [id number] [facility]      Search Criteria  Name Date of Birth Date Range  German serrano 1960 11- To 11-  Requester Name Requested Date  [facility] 11- 11:50:32 ([facility])  Summary  Prescriptions  32  Prescribers  6  Pharmacies  1  Drug Classes  Benzodiazepines  16  Stimulants  0  Opioids  16  Muscle Relaxants  0  Opioid Dosage  Total MME for Active Prescriptions  0    Average MME  75.02         Prescriptions  Notifications  1  Prescribers  Pharmacies  MME Graph    Show All     [place]   Drug Categories:      Benzodiazepines       Opioids     Show  10  entries  Search:  Patient Id Prescription # Filled Written Drug Label Qty Days Strength MME** Prescriber Pharmacy Payment REFILL #/Auth State Detail  1 [id number] 10/29/2024 10/28/2024 ALPRAZolam (Tablet) 45.0 22 0.5 MG DARCIE CABRAL) [employer] Insurance 0 / 0 [place]   1 [id number] 10/02/2024 10/01/2024 ALPRAZolam (Tablet) 45.0 22 0.5 MG DARCIE CABRAL) [facility] [employer] Insurance 0 / 0 [place]   1 [id number] 09/16/2024 09/16/2024 oxyCODONE HCL (Tablet) 84.0 11 5 MG 57.27 ALEX [employer] [facility] [employer] Private Pay 0 / 0 [place]   1 [id number] 09/06/2024 09/06/2024 oxyCODONE HCL (Tablet) 84.0 11 5 MG 57.27 [facility] [employer] [facility] [employer] Private Pay 0 / 0 [place]   1 [id number] 09/04/2024 09/03/2024 ALPRAZolam (Tablet) 45.0 22 0.5 MG DARCIE CABRAL) [employer] [facility] [employer] Commercial Insurance 0 / 0 [place]   1 [id number] 08/28/2024 08/28/2024 oxyCODONE HCL (Tablet) 84.0 11 5 MG 57.27 ALEX [employer] [facility] [employer] Private Pay 0 / 0 [place]   1 [id number] 08/19/2024 08/16/2024 oxyCODONE HCL (Tablet) 84.0 11 5 MG 57.27 LESLEE SEIP [facility] Private Pay 0 / 0 [place]   1 [id number] 08/08/2024 08/08/2024 oxyCODONE HCL (Tablet) 84.0 11 5 MG 57.27 LESLEE SEIP [facility] Private Pay 0 /  0 [place]   1 [id number] 08/06/2024 08/05/2024 ALPRAZolam (Tablet) 45.0 22 0.5 MG NA MARIANNA) [facility] Commercial [place] 0 / 0 [place]   1 [id number] 07/30/2024 07/29/2024 oxyCODONE HCL (Tablet) 84.0 11 5 MG 57.27 LESLEE  [facility] Private Pay

## 2024-12-16 DIAGNOSIS — F41.9 ANXIETY: ICD-10-CM

## 2024-12-16 RX ORDER — ALPRAZOLAM 0.5 MG
0.5 TABLET ORAL 2 TIMES DAILY PRN
Qty: 45 TABLET | Refills: 0 | Status: SHIPPED | OUTPATIENT
Start: 2024-12-16

## 2024-12-16 NOTE — TELEPHONE ENCOUNTER
Medication: ALPRAZolam (XANAX) 0.5 mg tablet    Dose/Frequency: Take 1 tablet (0.5 mg total) by mouth 2 (two) times a day as needed for anxiety    Quantity: 45    Pharmacy: [facility]    Office:   [x] PCP/Provider -   [] Speciality/Provider -     Does the patient have enough for 3 days?   [] Yes   [x] No - Send as HP to POD     Patient is currently out of medication, ran out yesterday.

## 2024-12-16 NOTE — TELEPHONE ENCOUNTER
Last seen 24 and I copied past refill into chart from pdmp web site and sent to provider for approval    Patient Prescription Report    Patients      Select Patient Id Name  [facility]   [] 1 DIONTE SERRANO 1960 M 50 [id number] [place]           Search Criteria    Name Date of Birth Date Range   dionte serrano 1960 To 2023     Requester Name Requested Date   ALEXA EDENMRA 2024 20:37:27 ([facility])     Summary  Prescriptions  6  Prescribers  3  Pharmacies  1  View Map  Drug Classes  Benzodiazepines  5  Stimulants  0  Opioids  1  Muscle Relaxants  0  Opioid Dosage  Total MME for Active Prescriptions  0    Average MME  22.50  MME Graph   MME Calculator     Prescriptions  Notifications    Prescribers  Pharmacies  MME Graph    [] [place] Drug Categories:     [] Benzodiazepines      [] Opioids      Showentries  Search:  Patient Id Prescription # Filled Written Drug Label Qty Days Strength MME** Prescriber Pharmacy Payment REFILL #/Auth State Detail   1 [id number] 2023 ALPRAZolam (Tablet) 45.0 22 0.5 MG NA MARIANNA) [facility] Commercial Insurance 1 / 2 [place]    1 [id number] 2023 ALPRAZolam (Tablet) 45.0 22 0.5 MG NA MARIANNA) [facility] Commercial Insurance 0 / 2 [place]    1 [id number] 2023 ALPRAZolam (Tablet) 30.0 30 0.5 MG NA MARIANNA) [facility] Commercial Insurance 0 / 0 [place]    1 [id number] 10/10/2023 10/06/2023 ALPRAZolam (Tablet) 30.0 30 0.5 MG NA BERLINM(MD) [facility] Commercial Insurance 0 / 0 [place]    1 [id number] 2023 ALPRAZolam (Tablet) 30.0 30 0.5 MG NA ALICJA KING [facility] Commercial Insurance 0 / 0 [place]    1 [id number] 2023 oxyCODONE HCL (Tablet) 9.0 3 5 MG 22.50 PROMISE STOCKHAUSEN [employer] [facility] Commercial Insurance 0 / 0 [place]    Showing 1 to 6 of 6 entries  [id number]     * Per CDC guidance, the conversion factors and associated daily morphine  milligram equivalents for drugs prescribed as part of medication-assisted treatment for opioid use disorder should not be used to benchmark against dosage thresholds meant for opioids prescribed for pain.  Report Disclaimer:  Information from the [place] Prescription Drug Monitoring Program (PDMP) database is protected health information and any information accessed must be treated as confidential. Any person who knowingly or intentionally makes an unauthorized disclosure of information from the PDMP database will be subject to civil and criminal penalties as set forth in the ABC-MAP Act 191 of 2014; Act of Oct. 27, 2014, P.L. 2911, No. 191.    The information in the PDMP database is submitted by pharmacies and may contain errors and omissions. Additionally, pharmacies may submit extraneous non-controlled substance dispensations to the PDMP database; if a non-controlled substance is present on one patient’s Prescription Report, it should not be assumed that this same medication will be reported on another patient’s Prescription Report. Independent verification of prescription information with pharmacies and prescribers may sometimes be prudent or necessary.  Educational content  CDC MME calculation guidelines  [place] Prescribing Guidelines  Letter Regarding the Misapplication of Prescribing Guidelines   Guide for Appropriate Tapering or Discontinuation of Long-Term Opioid Use   #[id number]

## 2024-12-18 DIAGNOSIS — E11.9 TYPE 2 DIABETES MELLITUS WITHOUT COMPLICATION, WITH LONG-TERM CURRENT USE OF INSULIN (HCC): ICD-10-CM

## 2024-12-18 DIAGNOSIS — Z79.4 TYPE 2 DIABETES MELLITUS WITHOUT COMPLICATION, WITH LONG-TERM CURRENT USE OF INSULIN (HCC): ICD-10-CM

## 2024-12-20 RX ORDER — INSULIN GLARGINE 300 U/ML
32 INJECTION, SOLUTION SUBCUTANEOUS
Qty: 6 ML | Refills: 2 | Status: SHIPPED | OUTPATIENT
Start: 2024-12-20

## 2025-01-09 DIAGNOSIS — F41.9 ANXIETY: ICD-10-CM

## 2025-01-09 NOTE — TELEPHONE ENCOUNTER
Medication:  ALPRAZolam (XANAX) 0.5 mg tablet    Dose/Frequency: 0.5 mg, Oral, 2 times daily PRN     Quantity: Dispense: 45 tablet  Refills: 0 ordered    Pharmacy: [facility] [place], [address] [phone]    Office:   [x] PCP/Provider -  Rory Herring MD   [] Speciality/Provider -     Does the patient have enough for 3 days?   [x] Yes   [] No - Send as HP to POD

## 2025-01-10 RX ORDER — ALPRAZOLAM 0.5 MG
0.5 TABLET ORAL 2 TIMES DAILY PRN
Qty: 45 TABLET | Refills: 0 | Status: SHIPPED | OUTPATIENT
Start: 2025-01-10

## 2025-01-10 NOTE — TELEPHONE ENCOUNTER
Last seen 9/17/24  Has appt 3/25/25     1 GERMAN SERRANO JR 1960 M 75 [id number] [place]      Search Criteria  Name Date of Birth Date Range  German serrano 1960 01- To 01-  Requester Name Requested Date  ETHANTORSTENLETA SHANNON 01- 19:22:48 ([facility])  Summary  Prescriptions  31  Prescribers  6  Pharmacies  1  Drug Classes  Benzodiazepines  15  Stimulants  0  Opioids  16  Muscle Relaxants  0  Opioid Dosage  Total MME for Active Prescriptions  0    Average MME  75.02         Prescriptions  Notifications    Prescribers  Pharmacies  MME Graph    Show All     [place]   Drug Categories:      Benzodiazepines       Opioids     Show  10  entries  Search:  Patient Id Prescription # Filled Written Drug Label Qty Days Strength MME** Prescriber Pharmacy Payment REFILL #/Auth State Detail  1 [id number] 12/17/2024 12/16/2024 ALPRAZolam (Tablet) 45.0 22 0.5 MG NA WASTORSTENM(MD) [facility]RA [facility] [employer] Commercial Insurance 0 / 0 [place]   1 [id number] 11/20/2024 11/20/2024 ALPRAZolam (Tablet) 45.0 22 0.5 MG NA WASSIM(MD) [facility] [facility] Commercial Insurance 0 / 0 [place]   1 [id number] 10/29/2024 10/28/2024 ALPRAZolam (Tablet) 45.0 22 0.5 MG NA WASSIM(MD) [facility] [facility] Commercial Insurance 0 / 0 [place]   1 [id number] 10/02/2024 10/01/2024 ALPRAZolam (Tablet) 45.0 22 0.5 MG NA WASSIM(MD) [employer] [facility] Commercial Insurance 0 / 0 [place]   1 [id number] 09/16/2024 09/16/2024 oxyCODONE HCL (Tablet) 84.0 11 5 MG 57.27 ALEX DOS SANTOS [facility] Private Pay 0 / 0 [place]   1 [id number] 09/06/2024 09/06/2024 oxyCODONE HCL (Tablet) 84.0 11 5 MG 57.27 ALEX DOS SANTOS [facility] Private Pay 0 / 0 [place]   1 [id number] 09/04/2024 09/03/2024 ALPRAZolam (Tablet) 45.0 22 0.5 MG NA WASSIM(MD) LYDIAMATEO [facility] Commercial Insurance 0 / 0 [place]   1 [id number] 08/28/2024 08/28/2024 oxyCODONE HCL (Tablet) 84.0 11 5 MG 57.27 ALEX HOBSONERS [facility] Private Pay 0 / 0 [place]   1 [id number] 08/19/2024 08/16/2024 oxyCODONE HCL (Tablet) 84.0 11 5 MG 57.27 [facility]KTORIA SEIP [facility] Private Pay 0 / 0 [place]   1 [id number] 08/08/2024 08/08/2024 oxyCODONE HCL (Tablet) 84.0 11 5 MG 57.27 LESLEE SEIP [facility] Private Pay 0 / 0 [place]

## 2025-01-10 NOTE — TELEPHONE ENCOUNTER
Pt called.  States only has enough medication for today 1/10.  Is kindly requesting a return call today 1/10 once the medication is sent to the [facility] [place].  ALPRAZolam (XANAX) 0.5 mg tablet   Please advise.

## 2025-01-22 ENCOUNTER — TELEPHONE (OUTPATIENT)
Age: 65
End: 2025-01-22

## 2025-01-22 NOTE — TELEPHONE ENCOUNTER
Patient called in stating he is wanting to get a new prescription for Zofran   States he uses it periodically but over the last few weeks has been nauseated and is out of the medication.   Next OV 3/25     Please send to pharmacy on file and follow up with Patient

## 2025-01-22 NOTE — TELEPHONE ENCOUNTER
Pt is requesting zofran for his occasional nausea. I did not see this on his current med list. I sent to provider for review and approval

## 2025-01-23 DIAGNOSIS — Z79.4 TYPE 2 DIABETES MELLITUS WITH MICROALBUMINURIA, WITH LONG-TERM CURRENT USE OF INSULIN (HCC): ICD-10-CM

## 2025-01-23 DIAGNOSIS — R80.9 TYPE 2 DIABETES MELLITUS WITH MICROALBUMINURIA, WITH LONG-TERM CURRENT USE OF INSULIN (HCC): ICD-10-CM

## 2025-01-23 DIAGNOSIS — R11.0 NAUSEA: Primary | ICD-10-CM

## 2025-01-23 DIAGNOSIS — I63.89 CEREBROVASCULAR ACCIDENT (CVA) DUE TO OTHER MECHANISM (HCC): ICD-10-CM

## 2025-01-23 DIAGNOSIS — E11.29 TYPE 2 DIABETES MELLITUS WITH MICROALBUMINURIA, WITH LONG-TERM CURRENT USE OF INSULIN (HCC): ICD-10-CM

## 2025-01-23 RX ORDER — ONDANSETRON 4 MG/1
4 TABLET, FILM COATED ORAL EVERY 8 HOURS PRN
Qty: 20 TABLET | Refills: 0 | Status: SHIPPED | OUTPATIENT
Start: 2025-01-23

## 2025-01-23 RX ORDER — ATORVASTATIN CALCIUM 40 MG/1
40 TABLET, FILM COATED ORAL DAILY
Qty: 90 TABLET | Refills: 1 | Status: SHIPPED | OUTPATIENT
Start: 2025-01-23 | End: 2025-07-22

## 2025-01-23 RX ORDER — PEN NEEDLE, DIABETIC 31 GX5/16"
NEEDLE, DISPOSABLE MISCELLANEOUS
Qty: 100 EACH | Refills: 5 | Status: SHIPPED | OUTPATIENT
Start: 2025-01-23

## 2025-02-03 DIAGNOSIS — F41.9 ANXIETY: ICD-10-CM

## 2025-02-03 RX ORDER — ALPRAZOLAM 0.5 MG
0.5 TABLET ORAL 2 TIMES DAILY PRN
Qty: 45 TABLET | Refills: 0 | Status: SHIPPED | OUTPATIENT
Start: 2025-02-03

## 2025-02-03 NOTE — TELEPHONE ENCOUNTER
Pharmacy requesting refill on xanax  Last seen 9/17/24  Last seen 3/25/25       1 GERMAN SERRANO JR 1960 M 54 [id number] [place]      Search Criteria  Name Date of Birth Date Range  German Serrano 1960 02- To 02-  Requester Name Requested Date  ALEXA EDENMRA 02- 21:26:22 ([facility])  Summary  Prescriptions  32  Prescribers  6  Pharmacies  1  Drug Classes  Benzodiazepines  16  Stimulants  0  Opioids  16  Muscle Relaxants  0  Opioid Dosage  Total MME for Active Prescriptions  0    Average MME  75.02         Prescriptions  Notifications    Prescribers  Pharmacies  MME Graph    Show All     [place]   Drug Categories:      Benzodiazepines       Opioids     Show  10  entries  Search:  Patient Id Prescription # Filled Written Drug Label Qty Days Strength MME** Prescriber Pharmacy Payment REFILL #/Auth State Detail  1 [id number] 01/11/2025 01/10/2025 ALPRAZolam (Tablet) 45.0 22 0.5 MG NA WASSIM(MD) [facility] [employer] Commercial Insurance 0 / 0 [place]   1 [id number] 12/17/2024 12/16/2024 ALPRAZolam (Tablet) 45.0 22 0.5 MG NA WASSIM(MD) [facility] Commercial Insurance 0 / 0 [place]   1 [id number] 11/20/2024 11/20/2024 ALPRAZolam (Tablet) 45.0 22 0.5 MG NA WASSIM(MD) [facility] Commercial Insurance 0 / 0 [place]   1 [id number] 10/29/2024 10/28/2024 ALPRAZolam (Tablet) 45.0 22 0.5 MG NA [facility] [place] 0 / 0 [place]   1 [id number] 10/02/2024 10/01/2024 ALPRAZolam (Tablet) 45.0 22 0.5 MG NA WASSIM(MD) [facility] Commercial Insurance 0 / 0 [place]   1 [id number] 09/16/2024 09/16/2024 oxyCODONE HCL (Tablet) 84.0 11 5 MG 57.27 ALEX HOBSONERS [facility] Private Pay 0 / 0 [place]   1 [id number] 09/06/2024 09/06/2024 oxyCODONE HCL (Tablet) 84.0 11 5 MG 57.27 LAEX DOS SANTOS [facility] Private Pay 0 / 0 [place]   1 [id number] 09/04/2024 09/03/2024 ALPRAZolam (Tablet) 45.0 22 0.5 MG NA MARIANNA) SHANNON [facility] Commercial Insurance 0 / 0 [place]   1 [id number] 08/28/2024 08/28/2024 oxyCODONE HCL  (Tablet) 84.0 11 5 MG 57.27 ALEX DOS SANTOS [facility] Private Pay 0 / 0 [place]   1 [id number] 08/19/2024 08/16/2024 oxyCODONE HCL (Tablet) 84.0 11 5 MG 57.27 LESLEE SEIP [facility] Private Pay

## 2025-02-24 DIAGNOSIS — I63.89 CEREBROVASCULAR ACCIDENT (CVA) DUE TO OTHER MECHANISM (HCC): ICD-10-CM

## 2025-02-24 DIAGNOSIS — F41.9 ANXIETY: ICD-10-CM

## 2025-02-24 RX ORDER — FENOFIBRATE 134 MG/1
134 CAPSULE ORAL
Qty: 30 CAPSULE | Refills: 0 | Status: SHIPPED | OUTPATIENT
Start: 2025-02-24

## 2025-02-24 RX ORDER — ALPRAZOLAM 0.5 MG
0.5 TABLET ORAL 2 TIMES DAILY PRN
Qty: 45 TABLET | Refills: 0 | Status: SHIPPED | OUTPATIENT
Start: 2025-02-24

## 2025-02-24 NOTE — TELEPHONE ENCOUNTER
Last seen 9/17/24  Has appt 3/25/25     1 GERMAN SERRANO JR 1960 M 45 [id number] [place]      Search Criteria  Name Date of Birth Date Range  German Serrano 1960 02- To 02-  Requester Name Requested Date  ETHANTORSTENLETA SHANNON 02- 18:50:00 ([facility])  Summary  Prescriptions  32  Prescribers  6  Pharmacies  1  Drug Classes  Benzodiazepines  16  Stimulants  0  Opioids  16  Muscle Relaxants  0  Opioid Dosage  Total MME for Active Prescriptions  0    Average MME  83.07         Prescriptions  Notifications    Prescribers  Pharmacies  MME Graph    Show All     [place]   Drug Categories:      Benzodiazepines       Opioids     Show  10  entries  Search:  Patient Id Prescription # Filled Written Drug Label Qty Days Strength MME** Prescriber Pharmacy Payment REFILL #/Auth State Detail  1 [id number] 02/04/2025 02/03/2025 ALPRAZolam (Tablet) 45.0 22 0.5 MG NA WASSIM(MD) [facility] Commercial Insurance 0 / 0 [place]   1 [id number] 01/11/2025 01/10/2025 ALPRAZolam (Tablet) 45.0 22 0.5 MG NA WASSIM(MD) [facility] Commercial Insurance 0 / 0 [place]   1 [id number] 12/17/2024 12/16/2024 ALPRAZolam (Tablet) 45.0 22 0.5 MG NA WASSIM(MD) [facility] Commercial Insurance 0 / 0 [place]   1 [id number] 11/20/2024 11/20/2024 ALPRAZolam (Tablet) 45.0 22 0.5 MG NA WASSIM(MD) [facility] Commercial Insurance 0 / 0 [place]   1 [id number] 10/29/2024 10/28/2024 ALPRAZolam (Tablet) 45.0 22 0.5 MG NA WASSIM(MD) [facility] Commercial Insurance 0 / 0 [place]   1 [id number] 10/02/2024 10/01/2024 ALPRAZolam (Tablet) 45.0 22 0.5 MG NA WASSIM(MD) [facility] Commercial Insurance 0 / 0 [place]   1 [id number] 09/16/2024 09/16/2024 oxyCODONE HCL (Tablet) 84.0 11 5 MG 57.27 ALEX DOS SANTOS [facility] Private Pay 0 / 0 [place]   1 [id number] 09/06/2024 09/06/2024 oxyCODONE HCL (Tablet) 84.0 11 5 MG 57.27 ALEX DOS SANTOS [facility] Private Pay 0 / 0 [place]   1 [id number] 09/04/2024 09/03/2024 ALPRAZolam (Tablet) 45.0 22 0.5 MG DARCIE CABRAL)  [facility] Commercial Insurance 0 / 0 [place]   1 [id number] 08/28/2024 08/28/2024 oxyCODONE HCL (Tablet) 84.0 11 5 MG 57.27 ALEX DOS SANTOS [facility] Private Pay

## 2025-02-24 NOTE — TELEPHONE ENCOUNTER
Reason for call:   [x] Refill   [] Prior Auth  [] Other:     Office:   [x] PCP/Provider -   [] Specialty/Provider -     FENOFIBRATE MICRONIZED 134 MG  ALPRAZolam (XANAX) 0.5 mg tablet     Pharmacy: [facility] [place], [address] [address]  Phone: [phone]  Fax: [phone]     Does the patient have enough for 3 days?   [] Yes   [x] No - Send as HP to POD

## 2025-02-25 DIAGNOSIS — R11.0 NAUSEA: ICD-10-CM

## 2025-02-25 RX ORDER — ONDANSETRON 4 MG/1
4 TABLET, FILM COATED ORAL EVERY 8 HOURS PRN
Qty: 20 TABLET | Refills: 0 | Status: SHIPPED | OUTPATIENT
Start: 2025-02-25

## 2025-02-25 RX ORDER — FENOFIBRATE 134 MG/1
134 CAPSULE ORAL
Qty: 30 CAPSULE | Refills: 4 | OUTPATIENT
Start: 2025-02-25

## 2025-02-25 NOTE — TELEPHONE ENCOUNTER
Reason for call:   [x] Refill   [] Prior Auth  [] Other:     Office:   [x] PCP/Provider -   [] Specialty/Provider -     Medication: ZOFRAN    Dose/Frequency: 4 MG    Quantity: 20    Pharmacy:   [facility]  [place], [address] [address]  Phone: [phone]  Fax: [phone]     Does the patient have enough for 3 days?   [] Yes   [x] No - Send as HP to POD

## 2025-03-18 DIAGNOSIS — F41.9 ANXIETY: ICD-10-CM

## 2025-03-18 NOTE — TELEPHONE ENCOUNTER
Reason for call:   [x] Refill   [] Prior Auth  [] Other:     Office:   [x] PCP/Provider -   [] Specialty/Provider -     Medication:     ALPRAZolam (XANAX) 0.5 mg  Take 1 tablet (0.5 mg total) by mouth 2 (two) times a day as needed for anxiety        Pharmacy: : [facility] [place]      Local Pharmacy   Does the patient have enough for 3 days?   [] Yes   [x] No - Send as HP to POD    Mail Away Pharmacy   Does the patient have enough for 10 days?   [] Yes   [] No - Send as HP to POD

## 2025-03-19 RX ORDER — ALPRAZOLAM 0.5 MG
0.5 TABLET ORAL 2 TIMES DAILY PRN
Qty: 45 TABLET | Refills: 0 | Status: SHIPPED | OUTPATIENT
Start: 2025-03-19

## 2025-03-19 NOTE — TELEPHONE ENCOUNTER
Last seen 24 but is scheduled for 3/25/25. I copied past refills into chart from pdmp web site and sent for provider approval    Patient Prescription Report    Patients      Select Patient Id Name  [facility]   [] 1 DIONTE SERRANO JR 1960 M 75 CCOBD [id number] [place]           Search Criteria    Name Date of Birth Date Range   dionte serrano 1960 To 2025     Requester Name Requested Date   WASSAMEER HARMONSAMRA 2025 13:18:07 ([facility])     Summary  Prescriptions  32  Prescribers  6  Pharmacies  1  View Map  Drug Classes  Benzodiazepines  16  Stimulants  0  Opioids  16  Muscle Relaxants  0  Opioid Dosage  Total MME for Active Prescriptions  0    Average MME  83.07  MME Graph   MME Calculator     Prescriptions  Notifications    Prescribers  Pharmacies  MME Graph    [] [place] Drug Categories:     [] Benzodiazepines      [] Opioids      Showentries  Search:  Patient Id Prescription # Filled Written Drug Label Qty Days Strength MME** Prescriber Pharmacy Payment REFILL #/Auth State Detail   1 [id number] 2025 ALPRAZolam (Tablet) 45.0 22 0.5 MG NA WASSIM(MD) [facility] [place] / 0 [place]    1 [id number] 2025 ALPRAZolam (Tablet) 45.0 22 0.5 MG NA WASSIM(MD) [facility] [place]    1 [id number] 2025 01/10/2025 ALPRAZolam (Tablet) 45.0 22 0.5 MG NA WASSIM(MD) [facility] [place]    1 [id number] 2024 ALPRAZolam (Tablet) 45.0 22 0.5 MG NA WASSIM(MD) [facility] [place]    1 [id number] 2024 ALPRAZolam (Tablet) 45.0 22 0.5 MG NA WASSIM(MD) [facility] [place]    1 [id number] 10/29/2024 10/28/2024 ALPRAZolam (Tablet) 45.0 22 0.5 MG NA ALEXA(MD) [facility] [place] / 0 [place]    1 [id number] 10/02/2024 10/01/2024 ALPRAZolam (Tablet) 45.0 22 0.5 MG NA ALEXA(MD) [facility] [place] / 0 [place]    1 [id number] 09/16/2024 09/16/2024 oxyCODONE HCL (Tablet) 84.0 11 5 MG 57.27 ALEXKEESHA [facility] [place] 0 / 0 [place]    1 [id number] 09/06/2024 09/06/2024 oxyCODONE HCL (Tablet) 84.0 11 5 MG 57.27 LAEX [facility] [place] / 0 [place]    1 [id number] 09/04/2024 09/03/2024 ALPRAZolam (Tablet) 45.0 22 0.5 MG NA MARIANNA) [facility] [place]    Showing 1 to 10 of 32 entries  [id number]     * Per CDC guidance, the conversion factors and associated daily morphine milligram equivalents for drugs prescribed as part of medication-assisted treatment for opioid use disorder should not be used to benchmark against dosage thresholds meant for opioids prescribed for pain.  Report Disclaimer:  Information from the [place] Prescription Drug Monitoring Program (PDMP) database is protected health information and any information accessed must be treated as confidential. Any person who knowingly or intentionally makes an unauthorized disclosure of information from the PDMP database will be subject to civil and criminal penalties as set forth in the ABC-MAP Act 191 of 2014; Act of Oct. 27, 2014, P.L. 2911, No. 191.    The information in the PDMP database is submitted by pharmacies and may contain errors and omissions. Additionally, pharmacies may submit extraneous non-controlled substance dispensations to the PDMP database; if a non-controlled substance is present on one patient’s Prescription Report, it should not be assumed that this same medication will be reported on another patient’s Prescription Report. Independent verification of prescription information with pharmacies and prescribers may sometimes be prudent or necessary.  Educational content  CDC MME calculation guidelines  [place] Prescribing Guidelines  Letter Regarding the Misapplication of Prescribing Guidelines   Guide for Appropriate Tapering or Discontinuation of Long-Term  Opioid Use   #[id number]

## 2025-03-21 ENCOUNTER — APPOINTMENT (OUTPATIENT)
Dept: LAB | Facility: IMAGING CENTER | Age: 65
End: 2025-03-21
Payer: MEDICARE

## 2025-03-21 DIAGNOSIS — Z79.4 TYPE 2 DIABETES MELLITUS WITH MICROALBUMINURIA, WITH LONG-TERM CURRENT USE OF INSULIN (HCC): ICD-10-CM

## 2025-03-21 DIAGNOSIS — Z79.4 TYPE 2 DIABETES MELLITUS WITHOUT COMPLICATION, WITH LONG-TERM CURRENT USE OF INSULIN (HCC): ICD-10-CM

## 2025-03-21 DIAGNOSIS — E11.9 TYPE 2 DIABETES MELLITUS WITHOUT COMPLICATION, WITH LONG-TERM CURRENT USE OF INSULIN (HCC): ICD-10-CM

## 2025-03-21 DIAGNOSIS — E11.29 TYPE 2 DIABETES MELLITUS WITH MICROALBUMINURIA, WITH LONG-TERM CURRENT USE OF INSULIN (HCC): ICD-10-CM

## 2025-03-21 DIAGNOSIS — R80.9 TYPE 2 DIABETES MELLITUS WITH MICROALBUMINURIA, WITH LONG-TERM CURRENT USE OF INSULIN (HCC): ICD-10-CM

## 2025-03-21 LAB
ALBUMIN SERPL BCG-MCNC: 4.5 G/DL (ref 3.5–5)
ALP SERPL-CCNC: 66 U/L (ref 34–104)
ALT SERPL W P-5'-P-CCNC: 11 U/L (ref 7–52)
ANION GAP SERPL CALCULATED.3IONS-SCNC: 9 MMOL/L (ref 4–13)
AST SERPL W P-5'-P-CCNC: 10 U/L (ref 13–39)
BASOPHILS # BLD AUTO: 0.07 THOUSANDS/ÂΜL (ref 0–0.1)
BASOPHILS NFR BLD AUTO: 1 % (ref 0–1)
BILIRUB SERPL-MCNC: 0.62 MG/DL (ref 0.2–1)
BUN SERPL-MCNC: 12 MG/DL (ref 5–25)
CALCIUM SERPL-MCNC: 9 MG/DL (ref 8.4–10.2)
CHLORIDE SERPL-SCNC: 102 MMOL/L (ref 96–108)
CHOLEST SERPL-MCNC: 117 MG/DL (ref ?–200)
CO2 SERPL-SCNC: 30 MMOL/L (ref 21–32)
CREAT SERPL-MCNC: 1.01 MG/DL (ref 0.6–1.3)
EOSINOPHIL # BLD AUTO: 0.24 THOUSAND/ÂΜL (ref 0–0.61)
EOSINOPHIL NFR BLD AUTO: 3 % (ref 0–6)
ERYTHROCYTE [DISTWIDTH] IN BLOOD BY AUTOMATED COUNT: 13.8 % (ref 11.6–15.1)
EST. AVERAGE GLUCOSE BLD GHB EST-MCNC: 128 MG/DL
GFR SERPL CREATININE-BSD FRML MDRD: 77 ML/MIN/1.73SQ M
GLUCOSE P FAST SERPL-MCNC: 80 MG/DL (ref 65–99)
HBA1C MFR BLD: 6.1 %
HCT VFR BLD AUTO: 47.5 % (ref 36.5–49.3)
HDLC SERPL-MCNC: 48 MG/DL
HGB BLD-MCNC: 15.3 G/DL (ref 12–17)
IMM GRANULOCYTES # BLD AUTO: 0.03 THOUSAND/UL (ref 0–0.2)
IMM GRANULOCYTES NFR BLD AUTO: 0 % (ref 0–2)
LDLC SERPL CALC-MCNC: 54 MG/DL (ref 0–100)
LYMPHOCYTES # BLD AUTO: 2.63 THOUSANDS/ÂΜL (ref 0.6–4.47)
LYMPHOCYTES NFR BLD AUTO: 32 % (ref 14–44)
MCH RBC QN AUTO: 28.9 PG (ref 26.8–34.3)
MCHC RBC AUTO-ENTMCNC: 32.2 G/DL (ref 31.4–37.4)
MCV RBC AUTO: 90 FL (ref 82–98)
MONOCYTES # BLD AUTO: 0.97 THOUSAND/ÂΜL (ref 0.17–1.22)
MONOCYTES NFR BLD AUTO: 12 % (ref 4–12)
NEUTROPHILS # BLD AUTO: 4.31 THOUSANDS/ÂΜL (ref 1.85–7.62)
NEUTS SEG NFR BLD AUTO: 52 % (ref 43–75)
NRBC BLD AUTO-RTO: 0 /100 WBCS
PLATELET # BLD AUTO: 286 THOUSANDS/UL (ref 149–390)
PMV BLD AUTO: 11 FL (ref 8.9–12.7)
POTASSIUM SERPL-SCNC: 3.7 MMOL/L (ref 3.5–5.3)
PROT SERPL-MCNC: 6.6 G/DL (ref 6.4–8.4)
RBC # BLD AUTO: 5.29 MILLION/UL (ref 3.88–5.62)
SODIUM SERPL-SCNC: 141 MMOL/L (ref 135–147)
TRIGL SERPL-MCNC: 76 MG/DL (ref ?–150)
TSH SERPL DL<=0.05 MIU/L-ACNC: 1.66 UIU/ML (ref 0.45–4.5)
WBC # BLD AUTO: 8.25 THOUSAND/UL (ref 4.31–10.16)

## 2025-03-21 PROCEDURE — 36415 COLL VENOUS BLD VENIPUNCTURE: CPT

## 2025-03-21 PROCEDURE — 84443 ASSAY THYROID STIM HORMONE: CPT

## 2025-03-21 PROCEDURE — 80053 COMPREHEN METABOLIC PANEL: CPT

## 2025-03-21 PROCEDURE — 80061 LIPID PANEL: CPT

## 2025-03-21 PROCEDURE — 85025 COMPLETE CBC W/AUTO DIFF WBC: CPT

## 2025-03-21 PROCEDURE — 83036 HEMOGLOBIN GLYCOSYLATED A1C: CPT

## 2025-03-25 ENCOUNTER — OFFICE VISIT (OUTPATIENT)
Dept: FAMILY MEDICINE CLINIC | Facility: CLINIC | Age: 65
End: 2025-03-25
Payer: MEDICARE

## 2025-03-25 VITALS
BODY MASS INDEX: 27.03 KG/M2 | RESPIRATION RATE: 16 BRPM | HEIGHT: 74 IN | SYSTOLIC BLOOD PRESSURE: 178 MMHG | HEART RATE: 96 BPM | TEMPERATURE: 97.9 F | DIASTOLIC BLOOD PRESSURE: 86 MMHG | OXYGEN SATURATION: 97 % | WEIGHT: 210.6 LBS

## 2025-03-25 DIAGNOSIS — M25.552 LEFT HIP PAIN: ICD-10-CM

## 2025-03-25 DIAGNOSIS — I73.9 CLAUDICATION (HCC): Primary | ICD-10-CM

## 2025-03-25 DIAGNOSIS — D68.2 HEREDITARY DEFICIENCY OF OTHER CLOTTING FACTORS (HCC): ICD-10-CM

## 2025-03-25 DIAGNOSIS — I69.354 HEMIPARESIS AFFECTING LEFT SIDE AS LATE EFFECT OF CEREBROVASCULAR ACCIDENT (CVA) (HCC): ICD-10-CM

## 2025-03-25 DIAGNOSIS — I10 ESSENTIAL HYPERTENSION: ICD-10-CM

## 2025-03-25 DIAGNOSIS — Z00.00 MEDICARE ANNUAL WELLNESS VISIT, SUBSEQUENT: ICD-10-CM

## 2025-03-25 DIAGNOSIS — Z79.4 TYPE 2 DIABETES MELLITUS WITH MICROALBUMINURIA, WITH LONG-TERM CURRENT USE OF INSULIN (HCC): ICD-10-CM

## 2025-03-25 DIAGNOSIS — E78.49 OTHER HYPERLIPIDEMIA: ICD-10-CM

## 2025-03-25 DIAGNOSIS — E11.29 TYPE 2 DIABETES MELLITUS WITH MICROALBUMINURIA, WITH LONG-TERM CURRENT USE OF INSULIN (HCC): ICD-10-CM

## 2025-03-25 DIAGNOSIS — I63.89 CEREBROVASCULAR ACCIDENT (CVA) DUE TO OTHER MECHANISM (HCC): ICD-10-CM

## 2025-03-25 DIAGNOSIS — R80.9 TYPE 2 DIABETES MELLITUS WITH MICROALBUMINURIA, WITH LONG-TERM CURRENT USE OF INSULIN (HCC): ICD-10-CM

## 2025-03-25 DIAGNOSIS — R06.02 SOB (SHORTNESS OF BREATH) ON EXERTION: ICD-10-CM

## 2025-03-25 PROCEDURE — G2211 COMPLEX E/M VISIT ADD ON: HCPCS | Performed by: FAMILY MEDICINE

## 2025-03-25 PROCEDURE — G0438 PPPS, INITIAL VISIT: HCPCS | Performed by: FAMILY MEDICINE

## 2025-03-25 PROCEDURE — 99214 OFFICE O/P EST MOD 30 MIN: CPT | Performed by: FAMILY MEDICINE

## 2025-03-25 RX ORDER — ATORVASTATIN CALCIUM 40 MG/1
40 TABLET, FILM COATED ORAL DAILY
Qty: 90 TABLET | Refills: 1 | Status: SHIPPED | OUTPATIENT
Start: 2025-03-25 | End: 2025-09-21

## 2025-03-25 RX ORDER — LISINOPRIL 20 MG/1
20 TABLET ORAL DAILY
Qty: 90 TABLET | Refills: 0 | Status: SHIPPED | OUTPATIENT
Start: 2025-03-25 | End: 2025-06-23

## 2025-03-25 RX ORDER — TADALAFIL 20 MG/1
20 TABLET ORAL DAILY PRN
COMMUNITY
Start: 2024-06-10 | End: 2025-06-10

## 2025-03-25 RX ORDER — AMLODIPINE BESYLATE 5 MG/1
5 TABLET ORAL DAILY
Qty: 90 TABLET | Refills: 1 | Status: SHIPPED | OUTPATIENT
Start: 2025-03-25

## 2025-03-25 NOTE — PROGRESS NOTES
Spoke with [facility] via phone for follow up anticoagulation visit.   Last INR on 5/16/22 was 2.1.  Dose maintained.   Today's INR is 2.3 and is within goal range.    Current warfarin total weekly dose of 17.5 mg verified.  Informed the INR result is within therapeutic range and instructed to maintain current dose per protocol. Discussed dose and return date of 7/11/22 for next INR. See Anticoagulation flowsheet.      [facility] INR today is 2.3, in range and up from previous INR of 2.1, in four weeks on dose of 17.5 mg/wk. No notation of changes to meds or diet. Instructed patient to continue TWD of 17.5mg/wk; INR in four weeks (7/11).       Dr. Robb is in the office today supervising the treatment.    Instructed to contact the clinic with any unusual bleeding or bruising, any changes in medications, diet, health status, lifestyle, or any other changes, questions or concerns. Verbalized understanding of all discussed.      Name: German Louie Jr.      : 1960      MRN: [id number]  Encounter Provider: Rory Herring MD  Encounter Date: 3/25/2025   Encounter department: [facility]    Assessment & Plan  Claudication (HCC)  Referral to see vascular surgeon.  Orders:  •  Ambulatory Referral to Vascular Surgery; Future    Left hip pain  Referral to see Ortho.  Orders:  •  Ambulatory Referral to Orthopedic Surgery; Future    Hereditary deficiency of other clotting factors (HCC)  We will continue to monitor.       Hemiparesis affecting left side as late effect of cerebrovascular accident (CVA) (HCC)  Symptoms improving.  Continue with physical therapy.       Type 2 diabetes mellitus with microalbuminuria, with long-term current use of insulin (HCC)  A1c is well-controlled at 6.1.  Continue same.  Continue to monitor fasting glucose and A1c.  Lab Results   Component Value Date    HGBA1C 6.1 (H) 2025          SOB (shortness of breath) on exertion  Referral to see cardiologist.  Orders:  •  Ambulatory Referral to Cardiology; Future    Cerebrovascular accident (CVA) due to other mechanism (HCC)  Continue same medications for now.  Continue to follow-up with neurologist.    Orders:  •  lisinopril (ZESTRIL) 20 mg tablet; Take 1 tablet (20 mg total) by mouth daily  •  amLODIPine (NORVASC) 5 mg tablet; Take 1 tablet (5 mg total) by mouth daily  •  atorvastatin (LIPITOR) 40 mg tablet; Take 1 tablet (40 mg total) by mouth daily    Other hyperlipidemia  Well controlled on atorvastatin and fenofibrate. Will continue to monitor.         Essential hypertension  Not well-controlled.  I am going to add amlodipine 5 mg daily.  Continue lisinopril.  Continue to monitor.       Medicare annual wellness visit, subsequent  It was discussed about immunizations, diet, exercise and safety measures.  Was referred to get colonoscopy.          Preventive health issues were discussed with patient, and age appropriate screening  tests were ordered as noted in patient's After Visit Summary. Personalized health advice and appropriate referrals for health education or preventive services given if needed, as noted in patient's After Visit Summary.    History of Present Illness     Is here today for follow-up multiple medical problems.  He has been taking his medications but denies any side effect.  His blood pressure abated.  He had a blood work done recently showed A1c improved to 6.1.  All the rest of blood work came back stable.  He has been having problems shortness of breath with exertion but denies any chest pain.  Also having problems with feeling tired when he walks.    Hypertension  Associated symptoms include shortness of breath. Pertinent negatives include no chest pain, headaches or palpitations.   Hyperlipidemia  Associated symptoms include shortness of breath. Pertinent negatives include no chest pain.      Patient Care Team:  Rory Herring MD as PCP - General (Family Medicine)  Pal Manley MD    Review of Systems   Constitutional:  Negative for chills and fever.   HENT:  Negative for trouble swallowing.    Eyes:  Negative for visual disturbance.   Respiratory:  Positive for shortness of breath. Negative for cough.    Cardiovascular:  Negative for chest pain and palpitations.   Gastrointestinal:  Negative for abdominal pain, blood in stool and vomiting.   Endocrine: Negative for cold intolerance and heat intolerance.   Genitourinary:  Negative for difficulty urinating and dysuria.   Musculoskeletal:  Positive for arthralgias, back pain and gait problem.   Skin:  Negative for rash.   Neurological:  Negative for dizziness, syncope and headaches.   Hematological:  Negative for adenopathy.   Psychiatric/Behavioral:  Negative for behavioral problems.      Medical History Reviewed by provider this encounter:  Tobacco  Allergies  Meds  Problems  Med Hx  Surg Hx  Fam Hx       Annual Wellness Visit Questionnaire   German is  here for his Subsequent Wellness visit.     Health Risk Assessment:   Patient rates overall health as poor. Patient feels that their physical health rating is much worse. Patient is satisfied with their life. Eyesight was rated as same. Hearing was rated as slightly worse. Patient feels that their emotional and mental health rating is same. Patients states they are sometimes angry. Patient states they are always unusually tired/fatigued. Pain experienced in the last 7 days has been a lot. Patient's pain rating has been 8/10. Patient states that he has experienced no weight loss or gain in last 6 months.     Depression Screening:   PHQ-2 Score: 3      Fall Risk Screening:   In the past year, patient has experienced: history of falling in past year    Number of falls: 2 or more  Injured during fall?: No    Feels unsteady when standing or walking?: Yes    Worried about falling?: Yes      Home Safety:  Patient has trouble with stairs inside or outside of their home. Patient has working smoke alarms and has no working carbon monoxide detector. Home safety hazards include: none.     Nutrition:   Current diet is Diabetic.     Medications:   Patient is not currently taking any over-the-counter supplements. Patient is able to manage medications.     Activities of Daily Living (ADLs)/Instrumental Activities of Daily Living (IADLs):   Walk and transfer into and out of bed and chair?: Yes  Dress and groom yourself?: Yes    Bathe or shower yourself?: Yes    Feed yourself? Yes  Do your laundry/housekeeping?: No  Manage your money, pay your bills and track your expenses?: Yes  Make your own meals?: Yes    Do your own shopping?: Yes    Previous Hospitalizations:   Any hospitalizations or ED visits within the last 12 months?: Yes      Advance Care Planning:   Living will: No    Durable POA for healthcare: No    Advanced directive: No      Cognitive Screening:   Provider or family/friend/caregiver concerned regarding cognition?:  No    PREVENTIVE SCREENINGS      Cardiovascular Screening:    General: Screening Not Indicated and History Lipid Disorder      Diabetes Screening:     General: Screening Not Indicated and History Diabetes      Colorectal Cancer Screening:     General: Risks and Benefits Discussed      Prostate Cancer Screening:    General: Risks and Benefits Discussed    Due for: PSA      Osteoporosis Screening:    General: Risks and Benefits Discussed      Abdominal Aortic Aneurysm (AAA) Screening:    Risk factors include: age between 65-76 yo and tobacco use        General: Risks and Benefits Discussed      Lung Cancer Screening:     General: Screening Current      Hepatitis C Screening:    General: Screening Current    Screening, Brief Intervention, and Referral to Treatment (SBIRT)     Screening  Typical number of drinks in a day: 0  Typical number of drinks in a week: 0  Interpretation: Low risk drinking behavior.    Brief Intervention  Alcohol & drug use screenings were reviewed. No concerns regarding substance use disorder identified.     Annual Depression Screening  Time spent screening and evaluating the patient for depression during today's encounter was 7 minutes.    Other Counseling Topics:   Calcium and vitamin D intake and regular weightbearing exercise.     Social Drivers of Health     Financial Resource Strain: Low Risk  (6/4/2024)    Received from [facility]    Overall Financial Resource Strain (CARDIA)    • Difficulty of Paying Living Expenses: Not very hard   Food Insecurity: No Food Insecurity (6/4/2024)    Received from [facility]    Hunger Vital Sign    • Worried About Running Out of Food in the Last Year: Never true    • Ran Out of Food in the Last Year: Never true   Transportation Needs: No Transportation Needs (6/4/2024)    Received from [facility]    PRAPARE - Transportation    • Lack of Transportation (Medical): No    • Lack of Transportation  "(Non-Medical): No   Housing Stability: Low Risk  (6/4/2024)    Received from [facility]    Housing Stability Vital Sign    • Unable to Pay for Housing in the Last Year: No    • Number of Times Moved in the Last Year: 1    • Homeless in the Last Year: No   Utilities: Not At Risk (6/4/2024)    Received from [facility] Utilities    • Threatened with loss of utilities: No     No results found.    Objective   BP (!) 178/86 (BP Location: Left arm, Patient Position: Sitting, Cuff Size: Large)   Pulse 96   Temp 97.9 °F (36.6 °C) (Tympanic)   Resp 16   Ht 6' 2\" (1.88 m)   Wt 95.5 kg (210 lb 9.6 oz)   SpO2 97%   BMI 27.04 kg/m²     Physical Exam  Vitals and nursing note reviewed.   Constitutional:       Appearance: He is well-developed.   HENT:      Head: Normocephalic and atraumatic.   Eyes:      Pupils: Pupils are equal, round, and reactive to light.   Cardiovascular:      Rate and Rhythm: Normal rate and regular rhythm.      Heart sounds: Normal heart sounds.   Pulmonary:      Effort: Pulmonary effort is normal.      Breath sounds: Normal breath sounds.   Abdominal:      General: Bowel sounds are normal.      Palpations: Abdomen is soft.   Musculoskeletal:      Cervical back: Normal range of motion and neck supple.   Lymphadenopathy:      Cervical: No cervical adenopathy.   Skin:     General: Skin is warm.      Findings: No rash.   Neurological:      Mental Status: He is alert and oriented to person, place, and time.       Depression Screening Follow-up Plan: Patient's depression screening was positive with a PHQ-2 score of 3. Their PHQ-9 score was . Patient with underlying depression and was advised to continue current medications as prescribed.  "

## 2025-03-27 DIAGNOSIS — I63.89 CEREBROVASCULAR ACCIDENT (CVA) DUE TO OTHER MECHANISM (HCC): ICD-10-CM

## 2025-03-28 RX ORDER — FENOFIBRATE 134 MG/1
134 CAPSULE ORAL
Qty: 30 CAPSULE | Refills: 5 | Status: SHIPPED | OUTPATIENT
Start: 2025-03-28

## 2025-03-31 PROBLEM — I73.9 CLAUDICATION (HCC): Status: ACTIVE | Noted: 2025-03-31

## 2025-03-31 PROBLEM — M25.552 LEFT HIP PAIN: Status: ACTIVE | Noted: 2025-03-31

## 2025-03-31 PROBLEM — R06.02 SOB (SHORTNESS OF BREATH) ON EXERTION: Status: ACTIVE | Noted: 2024-02-14

## 2025-04-01 NOTE — ASSESSMENT & PLAN NOTE
A1c is well-controlled at 6.1.  Continue same.  Continue to monitor fasting glucose and A1c.  Lab Results   Component Value Date    HGBA1C 6.1 (H) 03/21/2025

## 2025-04-01 NOTE — ASSESSMENT & PLAN NOTE
It was discussed about immunizations, diet, exercise and safety measures.  Was referred to get colonoscopy.

## 2025-04-01 NOTE — ASSESSMENT & PLAN NOTE
Not well-controlled.  I am going to add amlodipine 5 mg daily.  Continue lisinopril.  Continue to monitor.

## 2025-04-11 DIAGNOSIS — F41.9 ANXIETY: ICD-10-CM

## 2025-04-11 RX ORDER — ALPRAZOLAM 0.5 MG
0.5 TABLET ORAL 2 TIMES DAILY PRN
Qty: 45 TABLET | OUTPATIENT
Start: 2025-04-11

## 2025-04-11 RX ORDER — ALPRAZOLAM 0.5 MG
0.5 TABLET ORAL 2 TIMES DAILY PRN
Qty: 45 TABLET | Refills: 0 | Status: SHIPPED | OUTPATIENT
Start: 2025-04-11

## 2025-04-11 NOTE — TELEPHONE ENCOUNTER
Patient called to request a refill for their Alprazolam 0.5 mg  advised a refill was requested on 04/11/25 and is pending approval. Patient verbalized understanding and is in agreement.     Does the patient have enough for 3 days?   [] Yes   [x] No - Send as HP to POD

## 2025-04-11 NOTE — TELEPHONE ENCOUNTER
Reason for call:   [x] Refill   [] Prior Auth  [x] Other: out of medication     Office:   [x] PCP/Provider - Amanda Martin, / deon moreno  [] Specialty/Provider -     Medication:     ALPRAZolam (XANAX) 0.5 mg tablet       Dose/Frequency: y: Take 1 tablet (0.5 mg total) by mouth 2 (two) times a day as needed for anxiety         Pharmacy: [facility] [place] - [address] [facility]   Does the patient have enough for 3 days?   [] Yes   [x] No - Send as HP to POD

## 2025-04-11 NOTE — TELEPHONE ENCOUNTER
Last seen 3/25/25  Has appt 4/29/25     1 GERMAN SERRANO JR 1960 M 75 SHLKR [id number] [place]      Search Criteria  Name Date of Birth Date Range  German Serrano 1960 04- To 04-  Requester Name Requested Date  ALEXA SHANNON 04- 17:38:23 ([facility])  Summary  Prescriptions  32  Prescribers  7  Pharmacies  1  Drug Classes  Benzodiazepines  16  Stimulants  0  Opioids  16  Muscle Relaxants  0  Opioid Dosage  Total MME for Active Prescriptions  0    Average MME  83.07         Prescriptions  Notifications    Prescribers  Pharmacies  MME Graph    Show All     [place]   Drug Categories:      Benzodiazepines       Opioids     Show  10  entries  Search:  Patient Id Prescription # Filled Written Drug Label Qty Days Strength MME** Prescriber Pharmacy Payment REFILL #/Auth State Detail  1 [id number] 03/19/2025 03/19/2025 ALPRAZolam (Tablet) 45.0 22 0.5 MG NA GREGORY [facility] [place] / 0 [place]   1 [id number] 02/24/2025 02/24/2025 ALPRAZolam (Tablet) 45.0 22 0.5 MG NA WASSIM(MD) [facility] [place] / [place]   1 [id number] 02/04/2025 02/03/2025 ALPRAZolam (Tablet) 45.0 22 0.5 MG NA WASSIM(MD) [facility] [place] / [place]   1 [id number] 01/11/2025 01/10/2025 ALPRAZolam (Tablet) 45.0 22 0.5 MG NA WASSIM(MD) [facility] [place] / [place]   1 [id number] 12/17/2024 12/16/2024 ALPRAZolam (Tablet) 45.0 22 0.5 MG NA WASSIM(MD) [facility] [place] / [place]   1 [id number] 11/20/2024 11/20/2024 ALPRAZolam (Tablet) 45.0 22 0.5 MG NA WASSIM(MD) [facility] [place] / [place]   1 [id number] 10/29/2024 10/28/2024 ALPRAZolam (Tablet) 45.0 22 0.5 [facility] [place]   1 [id number] 10/02/2024 10/01/2024 ALPRAZolam (Tablet) 45.0 22 0.5 MG [facility] [place]   1 [id number] 09/16/2024 09/16/2024 oxyCODONE HCL (Tablet) 84.0 11 5  MG 57.27 [address]LEX [address] [facility] [place] / [place]   1 [id number] 09/06/2024 09/06/2024 oxyCODONE HCL (Tablet) 84.0 11 5 MG 57.27 [address] [facility] [place] / 0 [place]

## 2025-04-21 DIAGNOSIS — M25.552 LEFT HIP PAIN: Primary | ICD-10-CM

## 2025-04-22 ENCOUNTER — RA CDI HCC (OUTPATIENT)
Dept: OTHER | Facility: HOSPITAL | Age: 65
End: 2025-04-22

## 2025-04-29 ENCOUNTER — OFFICE VISIT (OUTPATIENT)
Dept: FAMILY MEDICINE CLINIC | Facility: CLINIC | Age: 65
End: 2025-04-29
Payer: MEDICARE

## 2025-04-29 VITALS
HEIGHT: 74 IN | DIASTOLIC BLOOD PRESSURE: 90 MMHG | SYSTOLIC BLOOD PRESSURE: 150 MMHG | BODY MASS INDEX: 27.95 KG/M2 | WEIGHT: 217.8 LBS | RESPIRATION RATE: 16 BRPM | HEART RATE: 90 BPM | TEMPERATURE: 95.9 F | OXYGEN SATURATION: 99 %

## 2025-04-29 DIAGNOSIS — E78.49 OTHER HYPERLIPIDEMIA: ICD-10-CM

## 2025-04-29 DIAGNOSIS — R80.9 TYPE 2 DIABETES MELLITUS WITH MICROALBUMINURIA, WITH LONG-TERM CURRENT USE OF INSULIN (HCC): Primary | ICD-10-CM

## 2025-04-29 DIAGNOSIS — L30.9 DERMATITIS: ICD-10-CM

## 2025-04-29 DIAGNOSIS — I10 ESSENTIAL HYPERTENSION: ICD-10-CM

## 2025-04-29 DIAGNOSIS — E11.29 TYPE 2 DIABETES MELLITUS WITH MICROALBUMINURIA, WITH LONG-TERM CURRENT USE OF INSULIN (HCC): Primary | ICD-10-CM

## 2025-04-29 DIAGNOSIS — Z79.4 TYPE 2 DIABETES MELLITUS WITH MICROALBUMINURIA, WITH LONG-TERM CURRENT USE OF INSULIN (HCC): Primary | ICD-10-CM

## 2025-04-29 PROCEDURE — 99214 OFFICE O/P EST MOD 30 MIN: CPT | Performed by: FAMILY MEDICINE

## 2025-04-29 PROCEDURE — G2211 COMPLEX E/M VISIT ADD ON: HCPCS | Performed by: FAMILY MEDICINE

## 2025-04-29 RX ORDER — TRIAMCINOLONE ACETONIDE 5 MG/G
CREAM TOPICAL 3 TIMES DAILY
Qty: 45 G | Refills: 1 | Status: SHIPPED | OUTPATIENT
Start: 2025-04-29

## 2025-04-29 NOTE — PROGRESS NOTES
"Name: German Louie Jr.      : 1960      MRN: [id number]  Encounter Provider: Rory Herring MD  Encounter Date: 2025   Encounter department: [facility]  :  Assessment & Plan  Type 2 diabetes mellitus with microalbuminuria, with long-term current use of insulin (HCC)    Lab Results   Component Value Date    HGBA1C 6.1 (H) 2025       Orders:  •  Hemoglobin A1C; Future  •  Comprehensive metabolic panel; Future  •  CBC and differential; Future  •  Lipid Panel with Direct LDL reflex; Future  •  TSH, 3rd generation with Free T4 reflex; Future    Essential hypertension  - Pt has not been taking amlodipine because.        Dermatitis    Orders:  •  triamcinolone (KENALOG) 0.5 % cream; Apply topically 3 (three) times a day    Other hyperlipidemia  Well controlled on atorvastatin and fenofibrate. Will continue to monitor.                History of Present Illness   The patient has a PMH of CVA, Type 2 DM, HTN, HLD presenting for 1 month f/u on HTN. Blood pressure in the office today was 150/90. The patient reports that he has not been taking his amlodipine 5 mg tab daily since his last visit because he thought the lisinopril was supposed to replace it. No updated labs available for review. No additional complaints.          Review of Systems    Objective   /90 (BP Location: Left arm, Patient Position: Sitting, Cuff Size: Standard)   Pulse 90   Temp (!) 95.9 °F (35.5 °C) (Tympanic)   Resp 16   Ht 6' 2\" (1.88 m)   Wt 98.8 kg (217 lb 12.8 oz)   SpO2 99%   BMI 27.96 kg/m²      Physical Exam  Vitals and nursing note reviewed.   Constitutional:       Appearance: He is well-developed.   HENT:      Head: Normocephalic and atraumatic.   Eyes:      Pupils: Pupils are equal, round, and reactive to light.   Cardiovascular:      Rate and Rhythm: Normal rate and regular rhythm.   Pulmonary:      Effort: Pulmonary effort is normal.      Breath sounds: Normal breath sounds. "   Abdominal:      General: Bowel sounds are normal.      Palpations: Abdomen is soft.   Musculoskeletal:      Cervical back: Normal range of motion and neck supple.   Lymphadenopathy:      Cervical: No cervical adenopathy.   Skin:     General: Skin is warm.      Findings: No rash.   Neurological:      Mental Status: He is alert and oriented to person, place, and time.

## 2025-04-30 PROBLEM — Z00.00 MEDICARE ANNUAL WELLNESS VISIT, SUBSEQUENT: Status: RESOLVED | Noted: 2019-10-14 | Resolved: 2025-04-30

## 2025-04-30 NOTE — ASSESSMENT & PLAN NOTE
Lab Results   Component Value Date    HGBA1C 6.1 (H) 03/21/2025       Orders:  •  Hemoglobin A1C; Future  •  Comprehensive metabolic panel; Future  •  CBC and differential; Future  •  Lipid Panel with Direct LDL reflex; Future  •  TSH, 3rd generation with Free T4 reflex; Future

## 2025-05-06 ENCOUNTER — HOSPITAL ENCOUNTER (OUTPATIENT)
Dept: RADIOLOGY | Facility: HOSPITAL | Age: 65
Discharge: HOME | End: 2025-05-06
Attending: ORTHOPAEDIC SURGERY
Payer: MEDICARE

## 2025-05-06 ENCOUNTER — OFFICE VISIT (OUTPATIENT)
Dept: OBGYN CLINIC | Facility: HOSPITAL | Age: 65
End: 2025-05-06
Payer: MEDICARE

## 2025-05-06 VITALS — WEIGHT: 215 LBS | BODY MASS INDEX: 27.59 KG/M2 | HEIGHT: 74 IN

## 2025-05-06 DIAGNOSIS — F41.9 ANXIETY: ICD-10-CM

## 2025-05-06 DIAGNOSIS — M25.552 BILATERAL HIP PAIN: ICD-10-CM

## 2025-05-06 DIAGNOSIS — M16.0 PRIMARY OSTEOARTHRITIS OF BOTH HIPS: Primary | ICD-10-CM

## 2025-05-06 DIAGNOSIS — M25.552 LEFT HIP PAIN: ICD-10-CM

## 2025-05-06 DIAGNOSIS — M25.551 PAIN IN RIGHT HIP: ICD-10-CM

## 2025-05-06 DIAGNOSIS — M25.551 BILATERAL HIP PAIN: ICD-10-CM

## 2025-05-06 PROCEDURE — 99203 OFFICE O/P NEW LOW 30 MIN: CPT | Performed by: ORTHOPAEDIC SURGERY

## 2025-05-06 PROCEDURE — 73521 X-RAY EXAM HIPS BI 2 VIEWS: CPT

## 2025-05-06 NOTE — PROGRESS NOTES
Assessment & Plan  Primary osteoarthritis of both hips  The patient has an examination consistent with B/L hip osteoarthritis.   I have discussed with the patient the pathophysiology of this diagnosis and reviewed how the examination correlates with this diagnosis.    Surgical vs conservative treatment options were discussed at length to included intra-articular CS injection,and total hip arthroplasty.  Referral placed for follow up with Dr. Hendrix   Discussed the calf tightness and lower extremity weakness is likely related to his prior stroke   Orders:    XR hips bilateral 2 vw w pelvis if performed; Future    Ambulatory Referral to Orthopedic Surgery; Future    Bilateral hip pain    Orders:    XR hips bilateral 2 vw w pelvis if performed; Future    Ambulatory Referral to Orthopedic Surgery; Future      Subjective:   Patient ID: German Louie Jr. is a 64 y.o. male      HPI  The patient presents with a chief complaint of B/L hip pain.   The pain began several year(s) ago and started after his stroke in August of 2023. The patient describes the pain as aching, dull, and sharp in intensity,  intermittent in timing, and localizes the pain to the  bilateral groin.  The pain is worse with movement, standing, and walking and relieved by rest.  The pain is not associated with numbness and tingling.  The pain is not associated with constitutional symptoms. The patient is awoken at night by the pain.         The following portions of the patient's history were reviewed and updated as appropriate: allergies, current medications, past family history, past medical history, past social history, past surgical history and problem list.    Review of Systems   Constitutional:  Negative for chills and fever.   HENT:  Negative for drooling and hearing loss.    Eyes:  Negative for visual disturbance.   Respiratory:  Negative for cough and shortness of breath.    Cardiovascular:  Negative for chest pain.   Gastrointestinal:   "Negative for abdominal pain.   Skin:  Negative for rash.   Psychiatric/Behavioral:  Negative for agitation.        Objective:  Ht 6' 2\" (1.88 m)   Wt 97.5 kg (215 lb)   BMI 27.60 kg/m²       Right Hip Exam     Tenderness   The patient is experiencing no tenderness.     Range of Motion   Flexion:  90   Right hip external rotation: 45.   Internal rotation:  10     Other   Erythema: absent  Sensation: normal  Pulse: present      Left Hip Exam     Tenderness   The patient is experiencing no tenderness.     Range of Motion   Flexion:  90   Left hip external rotation: 45.   Internal rotation: 5     Other   Erythema: absent  Sensation: normal  Pulse: present    Comments:    Pain with B/L ROM             Physical Exam  Vitals reviewed.   Constitutional:       Appearance: He is well-developed.   HENT:      Head: Normocephalic.   Eyes:      Pupils: Pupils are equal, round, and reactive to light.   Pulmonary:      Effort: Pulmonary effort is normal.   Abdominal:      General: Abdomen is flat. There is no distension.   Skin:     General: Skin is warm and dry.           I have personally reviewed pertinent films in PACS and my interpretation is as follows.    Bilateral hip x-rays demonstrates advanced degenerative changes.       Scribe Attestation      I,:  Nini Perera MA am acting as a scribe while in the presence of the attending physician.:       I,:  Nam Chino MD personally performed the services described in this documentation    as scribed in my presence.:            "

## 2025-05-06 NOTE — TELEPHONE ENCOUNTER
Reason for call:   [x] Refill   [] Prior Auth  [] Other:     Office:   [x] PCP/Provider - Rory Herring  [] Specialty/Provider -     Medication: Alprazolam     Dose/Frequency: 0.5 mg BID PRN    Quantity: 45    [facility]   Does the patient have enough for 3 days?   [] Yes   [x] No - Send as HP to POD    Mail Away Pharmacy   Does the patient have enough for 10 days?   [] Yes   [] No - Send as HP to POD

## 2025-05-06 NOTE — ASSESSMENT & PLAN NOTE
The patient has an examination consistent with B/L hip osteoarthritis.   I have discussed with the patient the pathophysiology of this diagnosis and reviewed how the examination correlates with this diagnosis.    Surgical vs conservative treatment options were discussed at length to included intra-articular CS injection,and total hip arthroplasty.  Referral placed for follow up with Dr. Hendrix   Discussed the calf tightness and lower extremity weakness is likely related to his prior stroke   Orders:    XR hips bilateral 2 vw w pelvis if performed; Future    Ambulatory Referral to Orthopedic Surgery; Future

## 2025-05-07 RX ORDER — ALPRAZOLAM 0.5 MG
0.5 TABLET ORAL 2 TIMES DAILY PRN
Qty: 45 TABLET | Refills: 0 | Status: SHIPPED | OUTPATIENT
Start: 2025-05-07

## 2025-05-07 NOTE — TELEPHONE ENCOUNTER
Last seen 4/29/25  Has appt 9/30/25     1 GERMAN SERRANO JR 1960 M 70 [id number] [place]      Search Criteria  Name Date of Birth Date Range  German Serrano 1960 05- To 05-  Requester Name Requested Date  ETHANSAMEER ORTEGA 05- 12:45:48 ([facility])  Summary  Prescriptions  31  Prescribers  7  Pharmacies  1  Drug Classes  Benzodiazepines  15  Stimulants  0  Opioids  16  Muscle Relaxants  0  Opioid Dosage  Total MME for Active Prescriptions  0    Average MME  83.07         Prescriptions  Notifications    Prescribers  Pharmacies  MME Graph    Show All     [place]   Drug Categories:      Benzodiazepines       Opioids     Show  10  entries  Search:  Patient Id Prescription # Sold Filled Written Drug Label Qty Days Strength MME* Prescriber Pharmacy Payment REFILL #/Auth State Detail  1 [id number] 04/15/2025 04/14/2025 04/11/2025 ALPRAZolam (Tablet) 45.0 22 0.5 MG NA ALEXA(MD) [facility] [place] / 0 [place]   1 [id number] 03/21/2025 03/19/2025 03/19/2025 ALPRAZolam (Tablet) 45.0 22 0.5 MG NA GREGORY PODRAZA [facility] [place] / 0 [place]   1 [id number] 02/25/2025 02/24/2025 02/24/2025 ALPRAZolam (Tablet) 45.0 22 0.5 MG NA WASTORSTENM(MD) [facility] [place] / 0 [place]   1 [id number] 02/04/2025 02/04/2025 02/03/2025 ALPRAZolam (Tablet) 45.0 22 0.5 MG NA WASSIM(MD) [facility] [place] / 0 [place]   1 [id number] 01/11/2025 01/11/2025 01/10/2025 ALPRAZolam (Tablet) 45.0 22 0.5 MG NA WASSIM(MD) [facility] [place] / 0 [place]   1 [id number] 12/19/2024 12/17/2024 12/16/2024 ALPRAZolam (Tablet) 45.0 22 0.5 MG NA WASSIM(MD) [facility] [place] / 0 [place]   1 [id number] 11/21/2024 11/20/2024 11/20/2024 ALPRAZolam (Tablet) 45.0 22 0.5 MG NA ALEXA(MD) [employer] [place] / 0 [place]   1 [id number] 10/29/2024 10/29/2024 10/28/2024 ALPRAZolam (Tablet) 45.0 22 0.5 MG NA [facility]) [facility]  [facility] [place] / 0 [place]   1 [id number] ** 10/02/2024 10/01/2024 ALPRAZolam (Tablet) 45.0 22 0.5 MG NA MARIANNA) [facility] [place] / 0 [place]   1 [id number] 09/16/2024 09/16/2024 09/16/2024 oxyCODONE HCL (Tablet) 84.0 11 5 MG 57.27 ALEX DOS SANTOS [facility] Private Pay 0 / 0

## 2025-05-07 NOTE — TELEPHONE ENCOUNTER
Patient called the refill line again for this medication. Patient is completely out of this medication. Please review

## 2025-05-13 ENCOUNTER — OFFICE VISIT (OUTPATIENT)
Dept: OBGYN CLINIC | Facility: CLINIC | Age: 65
End: 2025-05-13
Payer: MEDICARE

## 2025-05-13 VITALS — HEIGHT: 74 IN | BODY MASS INDEX: 27.59 KG/M2 | WEIGHT: 215 LBS

## 2025-05-13 DIAGNOSIS — T14.8XXA JOINT INJURY: ICD-10-CM

## 2025-05-13 DIAGNOSIS — M25.552 BILATERAL HIP PAIN: ICD-10-CM

## 2025-05-13 DIAGNOSIS — M17.11 PRIMARY OSTEOARTHRITIS OF RIGHT KNEE: ICD-10-CM

## 2025-05-13 DIAGNOSIS — M16.0 PRIMARY OSTEOARTHRITIS OF BOTH HIPS: Primary | ICD-10-CM

## 2025-05-13 DIAGNOSIS — M25.551 BILATERAL HIP PAIN: ICD-10-CM

## 2025-05-13 PROCEDURE — 99215 OFFICE O/P EST HI 40 MIN: CPT | Performed by: STUDENT IN AN ORGANIZED HEALTH CARE EDUCATION/TRAINING PROGRAM

## 2025-05-13 RX ORDER — SODIUM CHLORIDE, SODIUM LACTATE, POTASSIUM CHLORIDE, CALCIUM CHLORIDE 600; 310; 30; 20 MG/100ML; MG/100ML; MG/100ML; MG/100ML
20 INJECTION, SOLUTION INTRAVENOUS CONTINUOUS
OUTPATIENT
Start: 2025-05-13

## 2025-05-13 RX ORDER — ASCORBIC ACID 500 MG
500 TABLET ORAL DAILY
Qty: 30 TABLET | Refills: 0 | Status: SHIPPED | OUTPATIENT
Start: 2025-05-13

## 2025-05-13 RX ORDER — MUPIROCIN 20 MG/G
OINTMENT TOPICAL DAILY
Qty: 15 G | Refills: 0 | Status: SHIPPED | OUTPATIENT
Start: 2025-05-13

## 2025-05-13 RX ORDER — FOLIC ACID 1 MG/1
1 TABLET ORAL DAILY
Qty: 30 TABLET | Refills: 0 | Status: SHIPPED | OUTPATIENT
Start: 2025-05-13

## 2025-05-13 RX ORDER — SODIUM CHLORIDE, SODIUM LACTATE, POTASSIUM CHLORIDE, CALCIUM CHLORIDE 600; 310; 30; 20 MG/100ML; MG/100ML; MG/100ML; MG/100ML
125 INJECTION, SOLUTION INTRAVENOUS CONTINUOUS
OUTPATIENT
Start: 2025-05-13

## 2025-05-13 RX ORDER — CHLORHEXIDINE GLUCONATE ORAL RINSE 1.2 MG/ML
15 SOLUTION DENTAL ONCE
OUTPATIENT
Start: 2025-05-13 | End: 2025-05-13

## 2025-05-13 RX ORDER — CHLORHEXIDINE GLUCONATE 40 MG/ML
SOLUTION TOPICAL DAILY PRN
OUTPATIENT
Start: 2025-05-13

## 2025-05-13 RX ORDER — MULTIVIT-MIN/IRON FUM/FOLIC AC 7.5 MG-4
1 TABLET ORAL DAILY
Qty: 30 TABLET | Refills: 0 | Status: SHIPPED | OUTPATIENT
Start: 2025-05-13

## 2025-05-13 RX ORDER — FERROUS SULFATE 324(65)MG
324 TABLET, DELAYED RELEASE (ENTERIC COATED) ORAL EVERY OTHER DAY
Qty: 60 TABLET | Refills: 0 | Status: SHIPPED | OUTPATIENT
Start: 2025-05-13

## 2025-05-13 NOTE — PROGRESS NOTES
Date: 25  German Louie Jr.   MRN# [id number]  : 1960      Chief Complaint: Bilateral Hip Pain      The patient verbalized understanding of exam findings and treatment plan. We engaged in the shared decision-making process and treatment options were discussed at length with the patient. Surgical and conservative management discussed today along with risks and benefits. Patient was agreeable with the plan and all questions were answered to satisfaction.     Assessment & Plan  Primary osteoarthritis of both hips  Patient is a Middle-Aged (Approx 40-64yo) male with hip pain at rest or at night and identified modifiable risk factors.  Furthermore, they have radiographs which demonstrate severe OA and a physical exam revealing moderate range of motion limitation. Given these findings, I recommend:    Left Total Hip Arthroplasty (anterior approach)     - WBAT  - He has failed conservative treatment including anti-inflammatories, activity modification, PT/HEP  - Consent signed. All questions and concerns addressed  - F/u in 2 weeks post op    TOTAL HIP REPLACEMENT INDICATIONS AND RISKS  We had a lengthy discussion with the patient regarding the potential options for treatment.  Based on current presentation, radiographic exam, and lack of sufficient response to nonsurgical management including activity modification, NSAIDs and therapeutic exercise, I would offer treatment in the form of total hip arthroplasty at this time.      The potential risks and benefits were discussed in detail.    The patients current BMI is Body mass index is 27.6 kg/m². .Patient was counseled about the importance of weight loss prior to surgery, IF BMI is >35 prior to surgery my recommendation is that the patient considers nutritional consultation and further weight loss prior to surgical management. These recommendations were discussed with the patient.    If the patient is a smoker, I discussed the importance of quitting  smoking to decrease risk of infection postoperatively and promote good wound healing.     While no guarantees can be made, total hip replacement has a very high success rate in terms of relieving a patient's hip pain and returning them to a more active, independent lifestyle for 10-15 years or more. All surgery carries some risk; for hip replacement, the complication rate is low but may include: death (very rare), infection, bleeding requiring transfusion, blood clots in legs traveling to lungs, nerve and/or blood vessel damage, bone fracture, leg length difference, prosthetic hip dislocation, persistent hip pain and/or stiffness, and repeat surgery(ies). The risk of a major complication is generally 1-2 per 1000 cases. Total hip replacement should only be done if conservative treatment has failed. The predicted revision rate is approximately 1% per year; in other words, 90% of hip replacements last 10 years or more, 80% last 20 years or more, and so on, assuming no injury. Additionally, we discussed anesthesia related complications which will be discussed in greater detail with the anesthesia team before surgery. The patient voiced their understanding of the surgical plan and potential complications and wishes to proceed with surgery.                 Subjective:     Hip Pain  64 y.o. male presents to the office, referred by Dr. Chino,, for evaluation of bilateral hip pain. This is evaluated as a personal injury. The pain began several years ago. The pain is located groin and is made worse with walking, standing, sitting, and sleeping on it. Patient rates their pain as 7/10.  He describes the symptoms as aching and sharp. Symptoms improve with rest. The symptoms are worse with activity. The hip has given out or felt unstable. No other orthopedic complaints or concerns.     Prior treatment:  NSAIDs Yes    Physical Therapy Yes   Cortisone Injections No     External Records Reviewed:   ER records, lab reports, office  "notes, radiology reports, and x-ray reports    Orthopedic PMH  Hips previously treated by Dr. Chino    Orthopedic Surgical History  Lumbar Fusion - Dr. Mora - 2/24/17  Remote Cervical spine surgery - confirmed by pt    Estimated body mass index is 27.6 kg/m² as calculated from the following:    Height as of this encounter: 6' 2\" (1.88 m).    Weight as of this encounter: 97.5 kg (215 lb).    Lab Results   Component Value Date    HGBA1C 6.1 (H) 03/21/2025    HGBA1C 6.7 (H) 02/09/2024    HGBA1C 7.5 (H) 11/20/2023    HGBA1C 10.5 (H) 08/29/2023   .   Lab Results   Component Value Date    EGFR 77 03/21/2025    EGFR 96 06/07/2024    EGFR 96 06/06/2024    CREATININE 1.01 03/21/2025    CREATININE 0.88 06/07/2024    CREATININE 0.88 06/06/2024        Allergy:  Allergies   Allergen Reactions    Grass Pollen(K-O-R-T-Swt Jairo) Other (See Comments)     Sneezing, congestion    Zolpidem Other (See Comments)     severe drowsiness     Medications:  all current active meds have been reviewed  Past Medical History:  Past Medical History:   Diagnosis Date    Diabetes mellitus (HCC)     HLD (hyperlipidemia)     Hypertension      Past Surgical History:  Past Surgical History:   Procedure Laterality Date    ANKLE SURGERY Right     BACK SURGERY      L4-L5    NECK SURGERY       Family History:  Family History   Problem Relation Age of Onset    Diabetes Mother     Diabetes Father     Heart disease Maternal Grandfather     Heart disease Paternal Grandfather      Social History:  Social History     Substance and Sexual Activity   Alcohol Use Yes    Comment: rare     Social History     Substance and Sexual Activity   Drug Use Never     Social History     Tobacco Use   Smoking Status Every Day    Current packs/day: 1.00    Average packs/day: 1 pack/day for 25.4 years (25.4 ttl pk-yrs)    Types: Cigarettes    Start date: 2000   Smokeless Tobacco Never           Review of Systems:  General- denies fever/chills  HEENT- denies hearing loss or sore " "throat  Eyes- denies eye pain or visual disturbances, denies red eyes  Respiratory- denies cough or SOB  Cardio- denies chest pain or palpitations  GI- denies abdominal pain  Endocrine- denies urinary frequency  Urinary- denies pain with urination  Musculoskeletal- Negative except noted above  Skin- denies rashes or wounds  Neurological- denies dizziness or headache  Psychiatric- denies anxiety or difficulty concentrating      Objective:   BP Readings from Last 1 Encounters:   04/29/25 150/90      Wt Readings from Last 1 Encounters:   05/13/25 97.5 kg (215 lb)      Pulse Readings from Last 1 Encounters:   04/29/25 90        BMI: Estimated body mass index is 27.6 kg/m² as calculated from the following:    Height as of this encounter: 6' 2\" (1.88 m).    Weight as of this encounter: 97.5 kg (215 lb).      Physical Exam  Ht 6' 2\" (1.88 m)   Wt 97.5 kg (215 lb)   BMI 27.60 kg/m²   General/Constitutional: No apparent distress: well-nourished and well developed.  Eyes: normal ocular motion  Cardio: RRR, Normal S1S2, No m/r/g.   Lymphatic: No appreciable lymphadenopathy  Respiratory: Non-labored breathing, CTA b/l no w/c/r  Vascular: No edema, swelling or tenderness, except as noted in detailed exam. Extremities well perfused. No LE edema  Integumentary: No impressive skin lesions present, except as noted in detailed exam.  Neuro: No ataxia or tremors noted  Psych: Normal mood and affect, oriented to person, place and time. Appropriate affect.  Musculoskeletal: Normal, except as noted in detailed exam and in HPI.    Gait and Station:   antalgic    Left Hip Exam    Inspection: normal color, temperature, turgor and moisture    Range of Motion: limited with pain    negative  log roll   negative Trendelenburg sign  negative  Stinchfield  positive GEORGES  positive FADDIR    Motor: 5/5 Q/HS/TA/GS/EHL/FHL    Vascular: Toes WWP with BCR    SILT DP/SP/Leandra/Saph/Tib    Right Hip Exam    Inspection: normal color, temperature, turgor " and moisture    Range of Motion: limited with pain    negative  log roll   negative Trendelenburg sign  negative  Stinchfield  positive GEORGES  positive FADDIR    Motor: 5/5 Q/HS/TA/GS/EHL/FHL    Vascular: Toes WWP with BCR    SILT DP/SP/Leandra/Saph/Tib      Images:    I personally reviewed relevant images in the PACS system and my interpretation is as follows:    X-rays of the left hip reveals severe osteoarthritis with bone-on-bone articulation, subchondral sclerosis, subchondral cysts, osteophyte formation. No fracture or dislocation.     X-rays of the right hip reveals severe osteoarthritis with bone-on-bone articulation, subchondral sclerosis, subchondral cysts, osteophyte formation. No fracture or dislocation.       Scribe Attestation      I,:  Oscar Cobian PA-C am acting as a scribe while in the presence of the attending physician.:       I,:  Aric Hendrix MD personally performed the services described in this documentation    as scribed in my presence.:               Aric Hendrix MD  Adult Reconstruction Specialist   [facility]

## 2025-05-29 DIAGNOSIS — I63.89 CEREBROVASCULAR ACCIDENT (CVA) DUE TO OTHER MECHANISM (HCC): ICD-10-CM

## 2025-05-29 DIAGNOSIS — F41.9 ANXIETY: ICD-10-CM

## 2025-05-29 NOTE — TELEPHONE ENCOUNTER
Reason for call:   [x] Refill   [] Prior Auth  [] Other:     Office:   [x] PCP/Provider - Cosmo PLUMMER / Nevaeh    Medication: alprazolam    Dose/Frequency: 0.5mg bid prn    Quantity: 45    Pharmacy: [facility] [place] - [address] [facility]   Does the patient have enough for 3 days?   [] Yes   [x] No - Send as HP to POD

## 2025-05-30 RX ORDER — FENOFIBRATE 134 MG/1
134 CAPSULE ORAL
Qty: 30 CAPSULE | Refills: 5 | Status: SHIPPED | OUTPATIENT
Start: 2025-05-30

## 2025-05-30 RX ORDER — ALPRAZOLAM 0.5 MG
0.5 TABLET ORAL 2 TIMES DAILY PRN
Qty: 45 TABLET | Refills: 0 | Status: SHIPPED | OUTPATIENT
Start: 2025-05-30

## 2025-05-30 NOTE — TELEPHONE ENCOUNTER
Last seen 4/29/25  Has appt 9/30/25     1 GERMAN SERRANO JR 1960 M 76 [id number] [place]      Search Criteria  Name Date of Birth Date Range  German Serrano 1960 05- To 05-  Requester Name Requested Date  ALEXA SHANNON 05- 13:08:33 ([facility])  Summary  Prescriptions  31  Prescribers  7  Pharmacies  1  Drug Classes  Benzodiazepines  15  Stimulants  0  Opioids  16  Muscle Relaxants  0  Opioid Dosage  Total MME for Active Prescriptions  0    Average MME  83.07         Prescriptions  Notifications    Prescribers  Pharmacies  MME Graph    Show All     [place]   Drug Categories:      Benzodiazepines       Opioids     Show  10  entries  Search:  Patient Id Prescription # Sold Filled Written Drug Label Qty Days Strength MME* Prescriber Pharmacy Payment REFILL #/Auth State Detail  1 [id number] 05/08/2025 05/08/2025 05/07/2025 ALPRAZolam (Tablet) 45.0 22 0.5 MG NA MARIANNA) [facility] [place] / 0 [place]   1 [id number] 04/15/2025 04/14/2025 04/11/2025 ALPRAZolam (Tablet) 45.0 22 0.5 MG NA MARIANNA) [facility] [place] / 0 [place]   1 [id number] 03/21/2025 03/19/2025 03/19/2025 ALPRAZolam (Tablet) 45.0 22 0.5 MG NA GREGORY P[facility]RA[facility] [place] / 0 [place]   1 [id number] 02/25/2025 02/24/2025 02/24/2025 ALPRAZolam (Tablet) 45.0 22 0.5 MG NA MARIANNA) [facility] [place] / 0 [place]   1 [id number] 02/04/2025 02/04/2025 02/03/2025 ALPRAZolam (Tablet) 45.0 22 0.5 MG NA BERLINM(MD) [facility] [place] / 0 [place]   1 [id number] 01/11/2025 01/11/2025 01/10/2025 ALPRAZolam (Tablet) 45.0 22 0.5 MG NA MARIANNA) [facility] [employer] [place] / 0 [place]   1 [id number] 12/19/2024 12/17/2024 12/16/2024 ALPRAZolam (Tablet) 45.0 22 0.5 MG NA ALEXA(MD) [employer] [place] / 0 [place]   1 [id number] 11/21/2024 11/20/2024 11/20/2024 ALPRAZolam (Tablet) 45.0 22 0.5 MG NA [facility] [place]   1 [id number] 10/29/2024 10/29/2024 10/28/2024 ALPRAZolam (Tablet) 45.0 22 0.5 MG NA ALEXA(MD) [facility] [place] / [place]   1 [id number] ** 10/02/2024 10/01/2024 ALPRAZolam (Tablet) 45.0 22 0.5 MG NA ALEXA(MD) [facility] Commercial Insurance 0 / 0

## 2025-05-30 NOTE — TELEPHONE ENCOUNTER
Patient called and stated he is out of medication at this time. Patient is asking if the medication refill can please be sent in to his pharmacy by the end of the day. Please advise.    independent

## 2025-06-02 ENCOUNTER — TELEPHONE (OUTPATIENT)
Dept: OBGYN CLINIC | Facility: HOSPITAL | Age: 65
End: 2025-06-02

## 2025-06-04 NOTE — TELEPHONE ENCOUNTER
Preoperative Elective Admission Assessment: spoke to patient.     EKG/LAB/MRSA SWAB/CXR date: 6/11    *6/16 Vascular, new patient, claudification  *6/26 new patient with Cardio, SOB   *ELEAZAR 6/27     Living Situation:    Who does pt live with: spouse  What kind of home: multi-level  How do they enter the home: front  How many levels in home: 2 story colonial, first floor sectional, that becomes a bed, and bath    # of steps to enter home: 1 to enter   # of steps to second floor: 14 to 2nd floor- bedroom level  Are there handrails: Yes  Are there landings: No  Sleeping arrangement: first/entry floor  Where is Bathroom: First floor walk in shower, with seat.  Where is the tub or shower: Second floor walk in shower   Toilet height? Concerns for low toilets:Average - declined TSR     First Floor Setup:   Is there a bathroom: Yes  Where would pt sleep: couch     DME: rolling walker and cane - INSTRUCTED TO BRING RW DOS.   We discussed clearing pathways in the home and making sure there is accessibly to use the walker, for example, removing throw rugs.      Patient's Current Level of Function: Ambulates with cane and ADLs: Independent    Post-op Caregiver: spouse  Currently receive any HHC/aides/community supports: No     Post-op Transport: significant other  To/from hospital: spouse  To/from PT 2-3x/week: spouse  Uses community transport now: No     Outpatient Physical Therapy Site:  Site:   pre and post-op appts scheduled? No     Medication Management: self  Preferred Pharmacy for Post-op Medications:   [facility] - [place] - [address] [facility]  [address] [facility], [place] [address]     Blood Management Vitamin Regimen: Has at home to start this weekend.   Post-op anticoagulant: to be determined by surgical team postoperatively  Has Bactroban for 5 days preop: Yes  Educated on Preoperative Bathing Instructions, and use of Soap for 5 days before surgery.      DC Plan: Pt plans to be discharged  home    Barriers to DC identified preoperatively: none identified    BMI: 27.60    Patient Education:  Pt educated on post-op pain, early mobilization (POD0), LOS goals, OP PT goals, and preoperative bathing. Patient educated that our goal is to appropriately discharge patient based off their post-op function while striving to maintain maximal independence. The goal is to discharge patient to home and for them to attend outpatient physical therapy.    Assigned to care team? Yes

## 2025-06-10 ENCOUNTER — APPOINTMENT (OUTPATIENT)
Dept: LAB | Facility: IMAGING CENTER | Age: 65
End: 2025-06-10
Payer: MEDICARE

## 2025-06-10 DIAGNOSIS — Z01.812 PRE-PROCEDURE LAB EXAM: ICD-10-CM

## 2025-06-10 DIAGNOSIS — M16.0 PRIMARY OSTEOARTHRITIS OF BOTH HIPS: ICD-10-CM

## 2025-06-10 DIAGNOSIS — T14.8XXA JOINT INJURY: ICD-10-CM

## 2025-06-10 LAB
ALBUMIN SERPL BCG-MCNC: 4.3 G/DL (ref 3.5–5)
ALP SERPL-CCNC: 78 U/L (ref 34–104)
ALT SERPL W P-5'-P-CCNC: 12 U/L (ref 7–52)
ANION GAP SERPL CALCULATED.3IONS-SCNC: 9 MMOL/L (ref 4–13)
APTT PPP: 38 SECONDS (ref 23–34)
AST SERPL W P-5'-P-CCNC: 12 U/L (ref 13–39)
BASOPHILS # BLD AUTO: 0.04 THOUSANDS/ÂΜL (ref 0–0.1)
BASOPHILS NFR BLD AUTO: 1 % (ref 0–1)
BILIRUB SERPL-MCNC: 0.48 MG/DL (ref 0.2–1)
BUN SERPL-MCNC: 9 MG/DL (ref 5–25)
CALCIUM SERPL-MCNC: 8.8 MG/DL (ref 8.4–10.2)
CHLORIDE SERPL-SCNC: 105 MMOL/L (ref 96–108)
CO2 SERPL-SCNC: 28 MMOL/L (ref 21–32)
CREAT SERPL-MCNC: 0.94 MG/DL (ref 0.6–1.3)
EOSINOPHIL # BLD AUTO: 0.18 THOUSAND/ÂΜL (ref 0–0.61)
EOSINOPHIL NFR BLD AUTO: 4 % (ref 0–6)
ERYTHROCYTE [DISTWIDTH] IN BLOOD BY AUTOMATED COUNT: 13.7 % (ref 11.6–15.1)
EST. AVERAGE GLUCOSE BLD GHB EST-MCNC: 134 MG/DL
GFR SERPL CREATININE-BSD FRML MDRD: 84 ML/MIN/1.73SQ M
GLUCOSE SERPL-MCNC: 177 MG/DL (ref 65–140)
HBA1C MFR BLD: 6.3 %
HCT VFR BLD AUTO: 43.7 % (ref 36.5–49.3)
HGB BLD-MCNC: 14.2 G/DL (ref 12–17)
IMM GRANULOCYTES # BLD AUTO: 0.02 THOUSAND/UL (ref 0–0.2)
IMM GRANULOCYTES NFR BLD AUTO: 0 % (ref 0–2)
INR PPP: 0.96 (ref 0.85–1.19)
LYMPHOCYTES # BLD AUTO: 1.41 THOUSANDS/ÂΜL (ref 0.6–4.47)
LYMPHOCYTES NFR BLD AUTO: 27 % (ref 14–44)
MCH RBC QN AUTO: 29.3 PG (ref 26.8–34.3)
MCHC RBC AUTO-ENTMCNC: 32.5 G/DL (ref 31.4–37.4)
MCV RBC AUTO: 90 FL (ref 82–98)
MONOCYTES # BLD AUTO: 0.5 THOUSAND/ÂΜL (ref 0.17–1.22)
MONOCYTES NFR BLD AUTO: 10 % (ref 4–12)
NEUTROPHILS # BLD AUTO: 3.03 THOUSANDS/ÂΜL (ref 1.85–7.62)
NEUTS SEG NFR BLD AUTO: 58 % (ref 43–75)
NRBC BLD AUTO-RTO: 0 /100 WBCS
PLATELET # BLD AUTO: 194 THOUSANDS/UL (ref 149–390)
PMV BLD AUTO: 11 FL (ref 8.9–12.7)
POTASSIUM SERPL-SCNC: 4.4 MMOL/L (ref 3.5–5.3)
PROT SERPL-MCNC: 6.7 G/DL (ref 6.4–8.4)
PROTHROMBIN TIME: 13 SECONDS (ref 12.3–15)
RBC # BLD AUTO: 4.85 MILLION/UL (ref 3.88–5.62)
SODIUM SERPL-SCNC: 142 MMOL/L (ref 135–147)
WBC # BLD AUTO: 5.18 THOUSAND/UL (ref 4.31–10.16)

## 2025-06-10 PROCEDURE — 86901 BLOOD TYPING SEROLOGIC RH(D): CPT | Performed by: STUDENT IN AN ORGANIZED HEALTH CARE EDUCATION/TRAINING PROGRAM

## 2025-06-10 PROCEDURE — 36415 COLL VENOUS BLD VENIPUNCTURE: CPT

## 2025-06-10 PROCEDURE — 80053 COMPREHEN METABOLIC PANEL: CPT

## 2025-06-10 PROCEDURE — 85610 PROTHROMBIN TIME: CPT

## 2025-06-10 PROCEDURE — 87081 CULTURE SCREEN ONLY: CPT

## 2025-06-10 PROCEDURE — 85730 THROMBOPLASTIN TIME PARTIAL: CPT

## 2025-06-10 PROCEDURE — 83036 HEMOGLOBIN GLYCOSYLATED A1C: CPT

## 2025-06-10 PROCEDURE — 85025 COMPLETE CBC W/AUTO DIFF WBC: CPT

## 2025-06-10 PROCEDURE — 86850 RBC ANTIBODY SCREEN: CPT | Performed by: STUDENT IN AN ORGANIZED HEALTH CARE EDUCATION/TRAINING PROGRAM

## 2025-06-10 PROCEDURE — 86900 BLOOD TYPING SEROLOGIC ABO: CPT | Performed by: STUDENT IN AN ORGANIZED HEALTH CARE EDUCATION/TRAINING PROGRAM

## 2025-06-11 ENCOUNTER — LAB REQUISITION (OUTPATIENT)
Dept: LAB | Facility: HOSPITAL | Age: 65
End: 2025-06-11
Payer: MEDICARE

## 2025-06-11 DIAGNOSIS — Z01.812 ENCOUNTER FOR PREPROCEDURAL LABORATORY EXAMINATION: ICD-10-CM

## 2025-06-11 LAB
ABO GROUP BLD: NORMAL
BLD GP AB SCN SERPL QL: NEGATIVE
MRSA NOSE QL CULT: NORMAL
RH BLD: NEGATIVE
SPECIMEN EXPIRATION DATE: NORMAL

## 2025-06-12 NOTE — PRE-PROCEDURE INSTRUCTIONS
Pre-Surgery Instructions:   Medication Instructions    ALPRAZolam (XANAX) 0.5 mg tablet Uses PRN- OK to take day of surgery    amLODIPine (NORVASC) 5 mg tablet Take day of surgery.    ascorbic acid (VITAMIN C) 500 MG tablet Instructions provided by MD    aspirin (ECOTRIN LOW STRENGTH) 81 mg EC tablet No hold take as prescribed    atorvastatin (LIPITOR) 40 mg tablet Take night before surgery    fenofibrate micronized (LOFIBRA) 134 MG capsule Take night before surgery    ferrous sulfate 324 (65 Fe) mg Instructions provided by MD    folic acid (FOLVITE) 1 mg tablet Instructions provided by MD    insulin glargine (Toujeo Max SoloStar) 300 units/mL CONCENTRATED U-300 injection pen (2-unit dial) Take night before surgery    lisinopril (ZESTRIL) 20 mg tablet Hold day of surgery.    Multiple Vitamins-Minerals (multivitamin with minerals) tablet Instructions provided by MD    nabumetone (RELAFEN) 750 mg tablet Stop taking 3 days prior to surgery.    ondansetron (ZOFRAN) 4 mg tablet Uses PRN- OK to take day of surgery    Tamsulosin HCl (FLOMAX PO) Take night before surgery    triamcinolone (KENALOG) 0.5 % cream Hold day of surgery.    Medication instructions for day of surgery reviewed. Please take all instructed medications with only a sip of water. Please do not take any over the counter (non-prescribed) vitamins or supplements for one week prior to date of surgery.      You will receive a call one business day prior to surgery with an arrival time and hospital directions. If your surgery is scheduled on a Monday, the hospital will be calling you on the Friday prior to your surgery. If you have not heard from anyone by 8pm, please call the hospital supervisor through the hospital  at [phone]. ([place] [phone] or [place] [phone]).    Do not eat or drink anything after midnight the night before your surgery, including candy, mints, lifesavers, or chewing gum. Do not drink alcohol 24hrs before your  "surgery. Try not to smoke at least 24hrs before your surgery.       Follow the pre surgery showering instructions as listed in the “My Surgical Experience Booklet” or otherwise provided by your surgeon's office. Do not use a blade to shave the surgical area 1 week before surgery. It is okay to use a clean electric clippers up to 24 hours before surgery. Do not apply any lotions, creams, including makeup, cologne, deodorant, or perfumes after showering on the day of your surgery. Do not use dry shampoo, hair spray, hair gel, or any type of hair products.     No contact lenses, eye make-up, or artificial eyelashes. Remove nail polish, including gel polish, and any artificial, gel, or acrylic nails if possible. Remove all jewelry including rings and body piercing jewelry.     Wear causal clothing that is easy to take on and off. Consider your type of surgery.    Keep any valuables, jewelry, piercings at home. Please bring any specially ordered equipment (sling, braces) if indicated.    Arrange for a responsible person to drive you to and from the hospital on the day of your surgery. Please confirm the visitor policy for the day of your procedure when you receive your phone call with an arrival time.     Call the surgeon's office with any new illnesses, exposures, or additional questions prior to surgery.    Please reference your “My Surgical Experience Booklet” for additional information to prepare for your upcoming surgery.    Reviewed with patient, in detail, instructions from \"My Surgical Experience\". Verified with patient that he received TMLJ patient education from surgeon's office. Advised to call ortho office/surgery coordinator with any questions and/or concerns.   Confirmed that patient has hibiclens soap and mupirocin 5 days before surgery and day of surgery.will bring walker  Patient verbalized understanding of current visitor restrictions due to Covid and will clarify with nurse DOS. Instructed to avoid all " ASA and OTC Vit/Supp 1 week prior to surgery and to avoid NSAIDs 7 days prior to surgery per anesthesia guidelines.Tylenol ok to take prn.   No alcohol 24 hours prior to surgery. Patient aware Lovenox or other Blood Thinner prescribed is for POST OP ONLY. Instructed to call surgeon's office in meantime with any new illness.  Patient verbalized an understanding of all instructions reviewed and offers no concerns at this time.

## 2025-06-17 PROBLEM — Z98.1 ARTHRODESIS STATUS: Status: RESOLVED | Noted: 2024-04-19 | Resolved: 2025-06-17

## 2025-06-17 PROBLEM — M16.12 PRIMARY OSTEOARTHRITIS OF LEFT HIP: Status: ACTIVE | Noted: 2025-06-17

## 2025-06-17 PROBLEM — R09.89 SUSPECTED CEREBROVASCULAR ACCIDENT (CVA): Status: RESOLVED | Noted: 2023-08-29 | Resolved: 2025-06-17

## 2025-06-17 NOTE — PATIENT INSTRUCTIONS
DO NOT HOLD ASPIRIN prior to surgery  Take amlodipine the morning of surgery  Hold lisinopril the morning of surgery  Take 1/2 dose long acting insulin the evening prior to surgery    BEFORE SURGERY    Contact your surgical nurse navigator or surgical provider with any questions regarding preoperative plan or schedule.  Stop all over the counter supplements, herbal, naturopathic  medications for 1 week prior to surgery UNLESS prescribed by your surgeon  Hold NSAIDS (i.e. advil, alleve, motrin, ibuprofen, celebrex) minimum 5 days prior to surgery  Follow presurgical medication instructions provided by preadmission nursing team reviewed during your presurgery phone call  Strategies for optimizing your surgery through breathing exercises, nutrition and physical activity can be found at [email or web address]  Call [phone] with any presurgical concerns or medications questions or use the messaging feature in your [employer] michael to contact your provider    AFTER SURGERY    Recommend using Tylenol ( acetaminophen ) 1000 mg every eight hours during the first week post discharge along with icing the area for 20 mins every 3-4 hours while awake can be helpful in reducing your need for post operative opioid use. This opioid sparing plan can be used along side your surgeons pain plan.  Use stool softener over the counter (colace) daily after surgery during the first 1-2 weeks to avoid post operative constipation issues  If no bowel movement within 3 days after surgery then use over the counter Miralax in addition to your stool softener   If cleared by your surgical team for activity then early and often walking is encouraged and can be important in prevention of post surgical blood clots. Additionally spend as much time out of bed as possible and allowed by your surgical team  Use your incentive spirometer twice per hour in the first seven days after surgery to help prevent post surgery lung complications and infections  It is  Follow-up with your doctor.  Return to the emergency room for worsening cough shortness of breath fever or other concerns    very important you follow the instructions from your surgeon regarding any medications for after surgery blood clot prevention. Compliance with these medications or interventions is very important.  Call [phone] with any post discharge concerns or medical issues or use the messaging feature in your [facility] to contact your provider

## 2025-06-17 NOTE — ASSESSMENT & PLAN NOTE
Lab Results   Component Value Date    HGBA1C 6.3 (H) 06/10/2025   Hold medications as instructed by PAT  Adhere to strict DM diet in the post operative phase   Continue ADA diet

## 2025-06-17 NOTE — PROGRESS NOTES
Internal Medicine Pre-Operative Evaluation:     Reason for Visit: Pre-operative Evaluation for Risk Stratification and Optimization    Patient ID: German Louie Jr. is a 64 y.o. male.     Case: [id number] Date/Time: 07/07/25 0730   Procedure: ARTHROPLASTY LEFT HIP TOTAL ANTERIOR; SAME DAY (Left: Hip)   Anesthesia type: Choice   Diagnosis: Primary osteoarthritis of both hips [M16.0]   Pre-op diagnosis: Primary osteoarthritis of both hips [M16.0]   Location:  OR ROOM 03 / [facility]   Surgeons: Aric Hendrix MD         Recommendations to Proceed withSurgery    Patient is considered to be Medium risk for Medium risk procedure.     After evaluation and discussion with patient with emphasis that all surgery has some degree of inherent risk it is acknowledged by patient this risk is Acceptable.    Patient is optimized and may proceed with planned procedure.     Assessment    Pre-operative Medical Evaluation for planned surgery  Recommendations as listed in PLAN section below  Contact surgical nurse  navigator with any questions regarding preoperative plan or schedule.      Assessment & Plan  Preoperative clearance    Primary osteoarthritis of left hip  Failed outpatient conservative measures  Electing to undergo surgical procedure as stated above    Essential hypertension  Stable   Refer to PAT instructions regarding medication administration the morning of surgery    Type 2 diabetes mellitus with microalbuminuria, with long-term current use of insulin ([facility])    Lab Results   Component Value Date    HGBA1C 6.3 (H) 06/10/2025   Hold medications as instructed by PAT  Adhere to strict DM diet in the post operative phase   Continue ADA diet    Hemiparesis affecting left side as late effect of cerebrovascular accident (CVA) (Formerly [facility])  Stable  Instructed NOT to hold ASA/statin the day of surgery  SOB (shortness of breath) on exertion  improved           Plan:     1. Further  preoperative workup as follows:   - none no further testing required may proceed with surgery    2. Preoperative Medication Management Review performed by PAT nursing  YES    3. Patient requires further consultation with:   No Consults Required    4. Discharge Planning / Barriers to Discharge  none identified - patients has post discharge therapy plan in place, transportation arranged for discharge day, adequate family support at home to assist with discharge to home.        Subjective:           History of Present Illness:     German Louie Jr. is a 64 y.o. male who presents to the office today for a preoperative consultation at the request of surgeon. The patient understands this is an elective procedure and not emergent. They are electing to undergo planned procedure with an understanding that all surgery has inherent risk. They have worked with their surgeon and failed conservative treatment measures. Today they present for preoperative risk assessment and recommendations for optimization in preparation for surgery.    Pt seen in center for perioperative medicine for upcoming proposed surgery. They have failed previous conservative measures and have elected surgical intervention.     Pt meets presurgical lab and BMI optimization goals.    Pt has a h/o CVA in 2023 he was instructed not to hold his ASA prior to surgery. He has left sided residual d/t same.     He was referred to cardiology by his PCP d/t TYLER, he was scheduled with them 6/26/2025 however this was cancelled. His symptoms have improved significantly over the past 6 months. He had a work up in 2024 at [facility] with cardiology for similar symptoms and his stress testing/echo was normal. They did recommend loop recorder d/t dizziness however the patient refused to have this placed.     He was also referred to vascular d/t claudication symptoms, he was seen and cleared.    German Louie Jr. has an IN HOSPITAL cardiac risk of RCI RISK CLASS III (2 risk  "factors, risk of major cardiac compl. appr. 3.6%) based on RCRI calculator    Cardiac Risk Estimation: per the Revised Cardiac Risk Index (Circ. 100:1043, 1999),         Pre-op Exam    Previous history of bleeding disorders or clots?: No  Previous Anesthesia reaction?: Yes  Prolonged steroid use in the last 6 months?: No    Assessment of Cardiac Risk:   - Unstable or severe angina or MI in the last 6 weeks or history of stent placement in the last year?: No   - Decompensated heart failure (e.g. New onset heart failure, NYHA  Class IV heart failure, or worsening existing heart failure)?: No  - Significant arrhythmias such as high grade AV block, symptomatic ventricular arrhythmia, newly recognized ventricular tachycardia, supraventricular tachycardia with resting heart rate >100, or symptomatic bradycardia?: No  - Severe heart valve disease including aortic stenosis or symptomatic mitral stenosis?: No      Pre-operative Risk Factors:  Elevated-risk surgery: Yes    History of cerebrovascular disease: Yes  History of ischemic heart disease: No  Pre-operative treatment with insulin: Yes  Pre-operative creatinine >2 mg/dL: No    History of congestive heart failure: No    Duke Activity Status Index (DASI):   DASI Total Score: 18.95  METs: 5.1        ROS: No TIA's or unusual headaches, no dysphagia.  No prolonged cough. No dyspnea or chest pain on exertion.  No abdominal pain, change in bowel habits, black or bloody stools.  No urinary tract or BPH symptoms.  Positive reported pain in arthritic joint. Positive difficulty with gait. No skin rashes or issues.      Objective:    /72   Pulse 96   Ht 6' 2\" (1.88 m)   Wt 99.5 kg (219 lb 6.4 oz)   SpO2 99%   BMI 28.17 kg/m²       General Appearance: no distress, conversive  HEENT: PERRLA, conjuctiva normal; oropharynx clear; mucous membranes moist;   Neck:  Supple, no lymphadenopathy or thyromegaly  Lungs: breath sounds normal, normal respiratory effort, no " retractions, expiratory effort normal  CV: normal heart sounds S1/S2, PMI normal   ABD: soft non tender, +BS x4  EXT: DP pulses intact, no lymphadenopathy, no edema  Skin: normal turgor, normal texture, no rash  Psych: affect normal, mood normal  Neuro: AAOx3        The following portions of the patient's history were reviewed and updated as appropriate: allergies, current medications, past family history, past medical history, past social history, past surgical history and problem list.     Past History:       Past Medical History[1] Past Surgical History[2]       Social History[3]  Family History[4]       Allergies:     Allergies[5]     Current Medications:     Current Outpatient Medications   Medication Instructions    Accu-Chek Guide test strip No dose, route, or frequency recorded.    acetaminophen (TYLENOL) 500 mg, Every 4 hours PRN    Alcohol Swabs PADS     ALPRAZolam (XANAX) 0.5 mg, Oral, 2 times daily PRN    amLODIPine (NORVASC) 5 mg, Oral, Daily    ascorbic acid (VITAMIN C) 500 mg, Oral, Daily, Start 30 days prior to surgery    aspirin (ECOTRIN LOW STRENGTH) 81 mg, Oral, Daily    atorvastatin (LIPITOR) 40 mg, Oral, Daily    Blood Glucose Monitoring Suppl (Accu-Chek Guide) w/Device KIT No dose, route, or frequency recorded.    fenofibrate micronized (LOFIBRA) 134 mg, Oral, Daily with breakfast    ferrous sulfate 324 mg, Oral, Every other day, Start 30 days prior to surgery    folic acid (FOLVITE) 1 mg, Oral, Daily, Start 30 days prior to surgery    GNP Alcohol Swabs 70 % PADS No dose, route, or frequency recorded.    Insulin Pen Needle (Comfort EZ Pen Needles) 31G X 5 MM MISC USE DAILY WITH TOUJEO    lisinopril (ZESTRIL) 20 mg, Oral, Daily    Multiple Vitamins-Minerals (multivitamin with minerals) tablet 1 tablet, Oral, Daily, Start 30 days prior to procedure.    mupirocin (BACTROBAN) 2 % ointment Topical, Daily    nabumetone (RELAFEN) 750 mg, Oral, 2 times daily    ondansetron (ZOFRAN) 4 mg, Oral, Every 8  "hours PRN    tadalafil (CIALIS) 20 mg, Daily PRN    Tamsulosin HCl (FLOMAX PO) 0.04 mg    Toujeo Max SoloStar 32 Units, Subcutaneous, Daily at bedtime    triamcinolone (KENALOG) 0.5 % cream Topical, 3 times daily           PRE-OP WORKSHEET DATA    Assessment of Pre-Operative Risks     MLJ Quality Hard Stops:    BMI (<40) : Estimated body mass index is 28.17 kg/m² as calculated from the following:    Height as of this encounter: 6' 2\" (1.88 m).    Weight as of this encounter: 99.5 kg (219 lb 6.4 oz).    Hgb ( >11):   Lab Results   Component Value Date    HGB 14.2 06/10/2025    HGB 15.3 03/21/2025    HGB 13.9 02/28/2024       HbA1c (<7.5) :   Lab Results   Component Value Date    HGBA1C 6.3 (H) 06/10/2025       GFR (>60) (Less then 45 = Nephrology consult):    Lab Results   Component Value Date    EGFR 84 06/10/2025    EGFR 77 03/21/2025    EGFR 96 06/07/2024            Pre-Op Data Reviewed:       Laboratory Results: I have personally reviewed the pertinent reports    EKG: I personally reviewed and interpreted available tracings in the electronic medical record    Encounter Date: 06/18/25   EKG 12 lead   Result Value    Ventricular Rate 85    Atrial Rate 85    UT Interval 178    QRSD Interval 146    QT Interval 432    QTC Interval 514    P Axis 79    QRS Axis -29    T Wave Axis 49    Narrative    Normal sinus rhythm  Possible Left atrial enlargement  Right bundle branch block  Abnormal ECG  When compared with ECG of 13-May-2013 14:31,  Right bundle branch block is now Present  Confirmed by Adin Haines ([id number]) on 6/18/2025 4:04:30 PM       OLD RECORDS: reviewed old records in the chart review section if EHR on day of visit.    Previous cardiopulmonary studies within the past year:  Echocardiogram: none    Previous STRESS TEST:  No results found for this or any previous visit.      Previous Cath/PCI:  No results found for this or any previous visit    ECHO:  No results found for this or any previous " visit.        Time of visit including pre-visit chart review, visit and post-visit coordination of plan and care , review of pre-surgical lab work, preparation and time spent documenting note in electronic medical record, time spent face-to-face in physical examination answering patient questions by care team 45 minutes             [facility]         [1]   Past Medical History:  Diagnosis Date    Arthrodesis status 2024    Diabetes mellitus (HCC)     HLD (hyperlipidemia)     Hypertension     St[facility]CC)      [facility]      Suspected cerebrovascular accident (CVA) 2023   [2]   Past Surgical History:  Procedure Laterality Date    ANKLE SURGERY Right     BACK SURGERY      L4-L5    COLONOSCOPY      NECK SURGERY      2023   [3]   Social History  Tobacco Use    Smoking status: Former     Current packs/day: 0.00     Average packs/day: 1 pack/day for 25.0 years (25.0 ttl pk-yrs)     Types: Cigarettes     Start date:      Quit date:      Years since quittin.4    Smokeless tobacco: Never    Tobacco comments:     Quit 6 days ago   Vaping Use    Vaping status: Never Used   Substance Use Topics    Alcohol use: Not Currently    Drug use: Never   [4]   Family History  Problem Relation Name Age of Onset    Diabetes Mother      Diabetes Father      Heart disease Maternal Grandfather      Heart disease Paternal Grandfather     [5]   Allergies  Allergen Reactions    Grass Pollen(K-O-R-T-Swt Jairo) Other (See Comments)     Sneezing, congestion    Zolpidem Other (See Comments)     severe drowsiness

## 2025-06-18 ENCOUNTER — APPOINTMENT (OUTPATIENT)
Dept: LAB | Age: 65
End: 2025-06-18
Payer: MEDICARE

## 2025-06-18 DIAGNOSIS — M16.0 PRIMARY OSTEOARTHRITIS OF BOTH HIPS: ICD-10-CM

## 2025-06-18 LAB
ATRIAL RATE: 85 BPM
P AXIS: 79 DEGREES
PR INTERVAL: 178 MS
QRS AXIS: -29 DEGREES
QRSD INTERVAL: 146 MS
QT INTERVAL: 432 MS
QTC INTERVAL: 514 MS
T WAVE AXIS: 49 DEGREES
VENTRICULAR RATE: 85 BPM

## 2025-06-18 PROCEDURE — 93010 ELECTROCARDIOGRAM REPORT: CPT | Performed by: INTERNAL MEDICINE

## 2025-06-22 ENCOUNTER — ANESTHESIA EVENT (OUTPATIENT)
Age: 65
End: 2025-06-22
Payer: MEDICARE

## 2025-06-23 ENCOUNTER — TELEPHONE (OUTPATIENT)
Dept: FAMILY MEDICINE CLINIC | Facility: CLINIC | Age: 65
End: 2025-06-23

## 2025-06-23 DIAGNOSIS — F41.9 ANXIETY: ICD-10-CM

## 2025-06-23 DIAGNOSIS — I63.89 CEREBROVASCULAR ACCIDENT (CVA) DUE TO OTHER MECHANISM (HCC): ICD-10-CM

## 2025-06-23 RX ORDER — ALPRAZOLAM 0.5 MG
0.5 TABLET ORAL 2 TIMES DAILY PRN
Qty: 45 TABLET | Refills: 0 | Status: SHIPPED | OUTPATIENT
Start: 2025-06-23

## 2025-06-23 RX ORDER — ALPRAZOLAM 0.5 MG
0.5 TABLET ORAL 2 TIMES DAILY PRN
Qty: 45 TABLET | Refills: 0 | Status: CANCELLED | OUTPATIENT
Start: 2025-06-23

## 2025-06-23 RX ORDER — LISINOPRIL 20 MG/1
20 TABLET ORAL DAILY
Qty: 90 TABLET | Refills: 1 | Status: SHIPPED | OUTPATIENT
Start: 2025-06-23 | End: 2025-09-21

## 2025-06-23 NOTE — TELEPHONE ENCOUNTER
Patient called requesting refill for lisinopril. Patient made aware medication was refilled on 6/23/25 for 90 with 1 refills to [facility] pharmacy. Patient instructed to contact the pharmacy and speak with someone directly to obtain refills of medication. Patient advised to call back for refill if their pharmacy is unable to assist them. Patient verbalized understanding.

## 2025-06-23 NOTE — TELEPHONE ENCOUNTER
Last seen 4/29/25  Has appt 9/30/25       1 GERMAN SERRANO JR 1960 M [id number] [place]      Search Criteria  Name Date of Birth Date Range  German Serrano 1960 06- To 06-  Requester Name Requested Date  ALEXA ORTEGA 06- 13:43:23 ([facility])  Summary  Prescriptions  27  Prescribers  5  Pharmacies  1  Drug Classes  Benzodiazepines  14  Stimulants  0  Opioids  13  Muscle Relaxants  0  Opioid Dosage  Total MME for Active Prescriptions  0    Average MME  79.79         Prescriptions  Notifications    Prescribers  Pharmacies  MME Graph    Show All     [place]   Drug Categories:      Benzodiazepines       Opioids     Show  10  entries  Search:  Patient Id Prescription # Sold Filled Written Drug Label Qty Days Strength MME* Prescriber Pharmacy Payment REFILL #/Auth State Detail  1 [id number] 05/31/2025 05/31/2025 05/30/2025 ALPRAZolam (Tablet) 45.0 22 0.5 MG NA MARIANNA) [facility] [place] 0 / 0 [place]   1 [id number] 05/08/2025 05/08/2025 05/07/2025 ALPRAZolam (Tablet) 45.0 22 0.5 MG NA MARIANNA) [facility] [employer] 0 / 0 [place]   1 [id number] 04/15/2025 04/14/2025 04/11/2025 ALPRAZolam (Tablet) 45.0 22 0.5 MG NA MARIANNA) [facility] [place] / 0 [place]   1 [id number] 03/21/2025 03/19/2025 03/19/2025 ALPRAZolam (Tablet) 45.0 22 0.5 MG NA GREGORY [facility] [place] 0 / 0 [place]   1 [id number] 02/25/2025 02/24/2025 02/24/2025 ALPRAZolam (Tablet) 45.0 22 0.5 MG NA MARIANNA) [facility] [employer] [place] / 0 [place]   1 [id number] 02/04/2025 02/04/2025 02/03/2025 ALPRAZolam (Tablet) 45.0 22 0.5 MG NA MARIANNA) [facility] [place] / 0 [place]   1 [id number] 01/11/2025 01/11/2025 01/10/2025 ALPRAZolam (Tablet) 45.0 22 0.5 MG NA ALEXA(MD) [facility] [place] / 0 [place]   1 [id number] 12/19/2024 12/17/2024 12/16/2024 ALPRAZolam (Tablet) 45.0 22 0.5 MG NA [facility] [place] / 0 [place]   1 [id number] 11/21/2024 11/20/2024 11/20/2024 ALPRAZolam (Tablet) 45.0 22 0.5 MG NA MARIANNA) [facility] [place] 0 / 0 [place]   1 [id number] 10/29/2024 10/29/2024 10/28/2024 ALPRAZolam (Tablet) 45.0 22 0.5 MG NA MARIANNA) [facility]

## 2025-06-23 NOTE — TELEPHONE ENCOUNTER
Reason for call:   [x] Refill   [] Prior Auth  [] Other:     Office:   [x] PCP/Provider -   [] Specialty/Provider -     Medication: lisinopril (ZESTRIL) 20 mg tablet Take 1 tablet (20 mg total) by mouth daily     ALPRAZolam (XANAX) 0.5 mg tablet Take 1 tablet (0.5 mg total) by mouth 2 (two) times a day as needed for anxiety         Pharmacy: [facility] - [place] - [address] [facility]   Does the patient have enough for 3 days?   [] Yes   [x] No - Send as HP to POD    Mail Away Pharmacy   Does the patient have enough for 10 days?   [] Yes   [] No - Send as HP to POD

## 2025-06-24 NOTE — PROGRESS NOTES
Name: German Louie Jr.      : 1960      MRN: [id number]  Encounter Provider: KOMAL Fowler  Encounter Date: 2025   Encounter department: [facility] [place]  :  Assessment & Plan        History of Present Illness {?Quick Links Encounters * My Last Note * Last Note in Specialty * Snapshot * Since Last Visit * History :26106}  German Louie Jr. is a 64 y.o. male who presents ***  HPI  Review of Systems as per HPI.  {Select to insert medical history sections (Optional):99808}     Objective {?Quick Links Trend Vitals * Enter New Vitals * Results Review * Timeline (Adult) * Labs * Imaging * Cardiology * Procedures * Lung Cancer Screening * Surgical eConsent :19056}  There were no vitals taken for this visit.     Physical Exam ***     {Radiology Results Review (Optional):51671}    {Administrative / Billing Section (Optional):23515}

## 2025-06-24 NOTE — TELEPHONE ENCOUNTER
Patient calls regarding his alprazolam. I was able to report to the patient that his medication was sent to the pharmacy very late last night. Patient will call the pharmacy this AM and if the medication is not available then he will call the office back.

## 2025-06-24 NOTE — PROGRESS NOTES
Name: German Louie Jr.      : 1960      MRN: [id number]  Encounter Provider: KOMAL Fowler  Encounter Date: 2025   Encounter department: [facility]    63y/o M w/ hx of HTN, CVA, T2 DM, radiculopathy, arthritis, HLD. Pt is a new consult for claudication symptoms.   Assessment & Plan  Claudication (HCC)  Pt presents to the office with b/l leg claudication  -Reports shortness of breath and leg tiredness after ambulation of about 100 ft, denies rest pain, no wounds/ tissue loss.  -B/L lower leg numbness that is chronic.  -Denies motor/ sensory loss  -B/L DP and PT palpable 2+ pulses.  -Feet are warm and dry.   -No wounds/ tissue loss.    -Discussed his b/l leg pain symptoms are not caused by vascular etiology. He should return to his PCP for further recommendations and evaluation. No contraindication for orthopedic surgery from vascular standpoint for lower extremity arterial disease as the patient has b/l 2+ DP and PT pulses.     Plan  -Return to the office as needed  -Call the office with any new or worsening symptoms  -Continue with f/u with endocrinology.     Orders:    Ambulatory Referral to Vascular Surgery        History of Present Illness   HPI  German Louie Jr. is a 64 y.o. male who presents HTN, CVA, T2 DM, radiculopathy, arthritis, HLD. Pt is a new consult for claudication symptoms.     Had a fall about 4 days ago and hurt both hip and knees. He is still sore from this.     He is able to walk 100 yards and then gets fatigued and out breath. He has no life limiting claudication, no rest pain, no wounds/ tissue loss.   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 Does not follow with podiatry.  He does report cramping that occurs in early in the morning.     History obtained from: patient    Review of Systems   Skin:  Negative for color change and wound.   Neurological:  Positive for numbness.     Medications Ordered Prior to Encounter[1]      Objective   There were no  vitals taken for this visit.     Physical Exam  Vitals reviewed.   Constitutional:       General: He is not in acute distress.     Appearance: Normal appearance. He is not ill-appearing.   HENT:      Head: Normocephalic and atraumatic.     Cardiovascular:      Rate and Rhythm: Normal rate.      Pulses:           Radial pulses are 2+ on the right side and 2+ on the left side.        Femoral pulses are 2+ on the right side and 1+ on the left side.       Popliteal pulses are 1+ on the right side and 2+ on the left side.        Dorsalis pedis pulses are 2+ on the right side and 2+ on the left side.        Posterior tibial pulses are 2+ on the right side and 2+ on the left side.      Heart sounds: No murmur heard.  Pulmonary:      Effort: Pulmonary effort is normal. No respiratory distress.      Breath sounds: Normal breath sounds.     Musculoskeletal:         General: No tenderness.      Right lower leg: No edema.      Left lower leg: No edema.     Skin:     General: Skin is warm and dry.     Neurological:      Mental Status: He is alert and oriented to person, place, and time.      Sensory: Sensory deficit (numbness in b/l legs) present.     Psychiatric:         Mood and Affect: Mood normal.         Behavior: Behavior normal.         Administrative Statements   I have spent a total time of 30 minutes in caring for this patient on the day of the visit/encounter including Instructions for management, Importance of tx compliance, Risk factor reductions, Documenting in the medical record, and Obtaining or reviewing history  .         [1]   Current Outpatient Medications on File Prior to Visit   Medication Sig Dispense Refill    Accu-Chek Guide test strip       acetaminophen (TYLENOL) 500 mg tablet Take 500 mg by mouth every 4 (four) hours as needed Not taking      Alcohol Swabs PADS       ALPRAZolam (XANAX) 0.5 mg tablet Take 1 tablet (0.5 mg total) by mouth 2 (two) times a day as needed for anxiety 45 tablet 0     amLODIPine (NORVASC) 5 mg tablet Take 1 tablet (5 mg total) by mouth daily 90 tablet 1    ascorbic acid (VITAMIN C) 500 MG tablet Take 1 tablet (500 mg total) by mouth daily Start 30 days prior to surgery 30 tablet 0    aspirin (ECOTRIN LOW STRENGTH) 81 mg EC tablet Take 81 mg by mouth in the morning.      atorvastatin (LIPITOR) 40 mg tablet Take 1 tablet (40 mg total) by mouth daily 90 tablet 1    Blood Glucose Monitoring Suppl (Accu-Chek Guide) w/Device KIT       fenofibrate micronized (LOFIBRA) 134 MG capsule Take 1 capsule (134 mg total) by mouth daily with breakfast 30 capsule 5    ferrous sulfate 324 (65 Fe) mg Take 1 tablet (324 mg total) by mouth every other day Start 30 days prior to surgery 60 tablet 0    folic acid (FOLVITE) 1 mg tablet Take 1 tablet (1 mg total) by mouth daily Start 30 days prior to surgery 30 tablet 0    GNP Alcohol Swabs 70 % PADS       insulin glargine (Toujeo Max SoloStar) 300 units/mL CONCENTRATED U-300 injection pen (2-unit dial) Inject 32 Units under the skin daily at bedtime 6 mL 2    Insulin Pen Needle (Comfort EZ Pen Needles) 31G X 5 MM MISC USE DAILY WITH TOUJEO 100 each 5    lisinopril (ZESTRIL) 20 mg tablet Take 1 tablet (20 mg total) by mouth daily 90 tablet 1    Multiple Vitamins-Minerals (multivitamin with minerals) tablet Take 1 tablet by mouth daily Start 30 days prior to procedure. 30 tablet 0    mupirocin (BACTROBAN) 2 % ointment Apply topically daily 15 g 0    nabumetone (RELAFEN) 750 mg tablet Take 1 tablet (750 mg total) by mouth 2 (two) times a day 60 tablet 0    ondansetron (ZOFRAN) 4 mg tablet Take 1 tablet (4 mg total) by mouth every 8 (eight) hours as needed for nausea or vomiting 20 tablet 0    tadalafil (Cialis) 20 MG tablet Take 20 mg by mouth daily as needed Not taking      Tamsulosin HCl (FLOMAX PO) Take 0.04 mg by mouth      triamcinolone (KENALOG) 0.5 % cream Apply topically 3 (three) times a day 45 g 1    oxyCODONE (ROXICODONE) 5 immediate release  tablet  (Patient not taking: Reported on 5/6/2025)       No current facility-administered medications on file prior to visit.

## 2025-06-25 ENCOUNTER — CONSULT (OUTPATIENT)
Dept: VASCULAR SURGERY | Facility: CLINIC | Age: 65
End: 2025-06-25
Attending: FAMILY MEDICINE
Payer: MEDICARE

## 2025-06-25 VITALS
DIASTOLIC BLOOD PRESSURE: 66 MMHG | OXYGEN SATURATION: 98 % | WEIGHT: 217 LBS | HEIGHT: 74 IN | BODY MASS INDEX: 27.85 KG/M2 | SYSTOLIC BLOOD PRESSURE: 144 MMHG | HEART RATE: 90 BPM

## 2025-06-25 DIAGNOSIS — I73.9 CLAUDICATION (HCC): ICD-10-CM

## 2025-06-25 PROCEDURE — 99203 OFFICE O/P NEW LOW 30 MIN: CPT | Performed by: NURSE PRACTITIONER

## 2025-06-25 NOTE — ASSESSMENT & PLAN NOTE
Pt presents to the office with b/l leg claudication  -Reports shortness of breath and leg tiredness after ambulation of about 100 ft, denies rest pain, no wounds/ tissue loss.  -B/L lower leg numbness that is chronic.  -Denies motor/ sensory loss  -B/L DP and PT palpable 2+ pulses.  -Feet are warm and dry.   -No wounds/ tissue loss.    -Discussed his b/l leg pain symptoms are not caused by vascular etiology. He should return to his PCP for further recommendations and evaluation. No contraindication for orthopedic surgery from vascular standpoint for lower extremity arterial disease as the patient has b/l 2+ DP and PT pulses.     Plan  -Return to the office as needed  -Call the office with any new or worsening symptoms  -Continue with f/u with endocrinology.     Orders:    Ambulatory Referral to Vascular Surgery

## 2025-06-27 ENCOUNTER — OFFICE VISIT (OUTPATIENT)
Age: 65
End: 2025-06-27
Payer: MEDICARE

## 2025-06-27 VITALS
OXYGEN SATURATION: 99 % | DIASTOLIC BLOOD PRESSURE: 72 MMHG | WEIGHT: 219.4 LBS | HEART RATE: 96 BPM | BODY MASS INDEX: 28.16 KG/M2 | SYSTOLIC BLOOD PRESSURE: 144 MMHG | HEIGHT: 74 IN

## 2025-06-27 DIAGNOSIS — R06.02 SOB (SHORTNESS OF BREATH) ON EXERTION: ICD-10-CM

## 2025-06-27 DIAGNOSIS — E11.29 TYPE 2 DIABETES MELLITUS WITH MICROALBUMINURIA, WITH LONG-TERM CURRENT USE OF INSULIN (HCC): ICD-10-CM

## 2025-06-27 DIAGNOSIS — Z79.4 TYPE 2 DIABETES MELLITUS WITH MICROALBUMINURIA, WITH LONG-TERM CURRENT USE OF INSULIN (HCC): ICD-10-CM

## 2025-06-27 DIAGNOSIS — M16.0 PRIMARY OSTEOARTHRITIS OF BOTH HIPS: ICD-10-CM

## 2025-06-27 DIAGNOSIS — M16.12 PRIMARY OSTEOARTHRITIS OF LEFT HIP: ICD-10-CM

## 2025-06-27 DIAGNOSIS — I69.354 HEMIPARESIS AFFECTING LEFT SIDE AS LATE EFFECT OF CEREBROVASCULAR ACCIDENT (CVA) (HCC): ICD-10-CM

## 2025-06-27 DIAGNOSIS — Z01.818 PREOPERATIVE CLEARANCE: Primary | ICD-10-CM

## 2025-06-27 DIAGNOSIS — R80.9 TYPE 2 DIABETES MELLITUS WITH MICROALBUMINURIA, WITH LONG-TERM CURRENT USE OF INSULIN (HCC): ICD-10-CM

## 2025-06-27 DIAGNOSIS — I10 ESSENTIAL HYPERTENSION: ICD-10-CM

## 2025-06-27 PROCEDURE — G2211 COMPLEX E/M VISIT ADD ON: HCPCS | Performed by: NURSE PRACTITIONER

## 2025-06-27 PROCEDURE — 99214 OFFICE O/P EST MOD 30 MIN: CPT | Performed by: NURSE PRACTITIONER

## 2025-07-07 ENCOUNTER — APPOINTMENT (OUTPATIENT)
Age: 65
End: 2025-07-07
Payer: MEDICARE

## 2025-07-07 ENCOUNTER — HOSPITAL ENCOUNTER (OUTPATIENT)
Age: 65
Setting detail: OUTPATIENT SURGERY
Discharge: HOME | End: 2025-07-07
Attending: STUDENT IN AN ORGANIZED HEALTH CARE EDUCATION/TRAINING PROGRAM | Admitting: STUDENT IN AN ORGANIZED HEALTH CARE EDUCATION/TRAINING PROGRAM
Payer: MEDICARE

## 2025-07-07 ENCOUNTER — ANESTHESIA (OUTPATIENT)
Age: 65
End: 2025-07-07
Payer: MEDICARE

## 2025-07-07 VITALS
HEIGHT: 74 IN | WEIGHT: 219 LBS | SYSTOLIC BLOOD PRESSURE: 151 MMHG | OXYGEN SATURATION: 98 % | DIASTOLIC BLOOD PRESSURE: 74 MMHG | HEART RATE: 86 BPM | TEMPERATURE: 98.9 F | BODY MASS INDEX: 28.11 KG/M2 | RESPIRATION RATE: 20 BRPM

## 2025-07-07 DIAGNOSIS — M16.12 PRIMARY OSTEOARTHRITIS OF LEFT HIP: Primary | ICD-10-CM

## 2025-07-07 LAB — GLUCOSE SERPL-MCNC: 146 MG/DL (ref 65–140)

## 2025-07-07 PROCEDURE — 97163 PT EVAL HIGH COMPLEX 45 MIN: CPT | Performed by: PHYSICAL THERAPIST

## 2025-07-07 PROCEDURE — 72170 X-RAY EXAM OF PELVIS: CPT

## 2025-07-07 PROCEDURE — C1776 JOINT DEVICE (IMPLANTABLE): HCPCS | Performed by: STUDENT IN AN ORGANIZED HEALTH CARE EDUCATION/TRAINING PROGRAM

## 2025-07-07 PROCEDURE — 73501 X-RAY EXAM HIP UNI 1 VIEW: CPT

## 2025-07-07 PROCEDURE — NC001 PR NO CHARGE

## 2025-07-07 PROCEDURE — 27130 TOTAL HIP ARTHROPLASTY: CPT | Performed by: STUDENT IN AN ORGANIZED HEALTH CARE EDUCATION/TRAINING PROGRAM

## 2025-07-07 PROCEDURE — 82948 REAGENT STRIP/BLOOD GLUCOSE: CPT

## 2025-07-07 PROCEDURE — 97167 OT EVAL HIGH COMPLEX 60 MIN: CPT

## 2025-07-07 PROCEDURE — 0054T BONE SRGRY CMPTR FLUOR IMAGE: CPT | Performed by: STUDENT IN AN ORGANIZED HEALTH CARE EDUCATION/TRAINING PROGRAM

## 2025-07-07 DEVICE — BIOLOX® DELTA, CERAMIC FEMORAL HEAD, M, Ø 36/0, TAPER 12/14
Type: IMPLANTABLE DEVICE | Site: HIP | Status: FUNCTIONAL
Brand: BIOLOX® DELTA

## 2025-07-07 DEVICE — IMPLANTABLE DEVICE
Type: IMPLANTABLE DEVICE | Site: HIP | Status: FUNCTIONAL
Brand: G7® VIVACIT-E®

## 2025-07-07 DEVICE — IMPLANTABLE DEVICE
Type: IMPLANTABLE DEVICE | Site: HIP | Status: FUNCTIONAL
Brand: G7® ACETABULAR SYSTEM

## 2025-07-07 RX ORDER — LIDOCAINE HYDROCHLORIDE 10 MG/ML
INJECTION, SOLUTION EPIDURAL; INFILTRATION; INTRACAUDAL; PERINEURAL AS NEEDED
Status: DISCONTINUED | OUTPATIENT
Start: 2025-07-07 | End: 2025-07-07

## 2025-07-07 RX ORDER — DEXAMETHASONE SODIUM PHOSPHATE 10 MG/ML
INJECTION, SOLUTION INTRAMUSCULAR; INTRAVENOUS AS NEEDED
Status: DISCONTINUED | OUTPATIENT
Start: 2025-07-07 | End: 2025-07-07

## 2025-07-07 RX ORDER — FENTANYL CITRATE 50 UG/ML
INJECTION, SOLUTION INTRAMUSCULAR; INTRAVENOUS AS NEEDED
Status: DISCONTINUED | OUTPATIENT
Start: 2025-07-07 | End: 2025-07-07

## 2025-07-07 RX ORDER — SODIUM CHLORIDE, SODIUM LACTATE, POTASSIUM CHLORIDE, CALCIUM CHLORIDE 600; 310; 30; 20 MG/100ML; MG/100ML; MG/100ML; MG/100ML
INJECTION, SOLUTION INTRAVENOUS CONTINUOUS PRN
Status: DISCONTINUED | OUTPATIENT
Start: 2025-07-07 | End: 2025-07-07

## 2025-07-07 RX ORDER — CHLORHEXIDINE GLUCONATE 40 MG/ML
SOLUTION TOPICAL DAILY PRN
Status: DISCONTINUED | OUTPATIENT
Start: 2025-07-07 | End: 2025-07-07 | Stop reason: HOSPADM

## 2025-07-07 RX ORDER — ASPIRIN 81 MG/1
81 TABLET ORAL 2 TIMES DAILY
Qty: 84 TABLET | Refills: 0 | Status: SHIPPED | OUTPATIENT
Start: 2025-07-07 | End: 2025-08-18

## 2025-07-07 RX ORDER — ACETAMINOPHEN 500 MG
1000 TABLET ORAL EVERY 8 HOURS PRN
Qty: 84 TABLET | Refills: 0 | Status: SHIPPED | OUTPATIENT
Start: 2025-07-07 | End: 2025-07-21

## 2025-07-07 RX ORDER — ALPRAZOLAM 0.25 MG
0.5 TABLET ORAL 2 TIMES DAILY PRN
Status: DISCONTINUED | OUTPATIENT
Start: 2025-07-07 | End: 2025-07-07 | Stop reason: HOSPADM

## 2025-07-07 RX ORDER — CELECOXIB 200 MG/1
200 CAPSULE ORAL 2 TIMES DAILY
Qty: 60 CAPSULE | Refills: 0 | Status: SHIPPED | OUTPATIENT
Start: 2025-07-07 | End: 2025-08-06

## 2025-07-07 RX ORDER — LISINOPRIL 10 MG/1
20 TABLET ORAL DAILY
Status: DISCONTINUED | OUTPATIENT
Start: 2025-07-07 | End: 2025-07-07 | Stop reason: HOSPADM

## 2025-07-07 RX ORDER — VANCOMYCIN HYDROCHLORIDE 1 G/20ML
INJECTION, POWDER, LYOPHILIZED, FOR SOLUTION INTRAVENOUS AS NEEDED
Status: DISCONTINUED | OUTPATIENT
Start: 2025-07-07 | End: 2025-07-07 | Stop reason: HOSPADM

## 2025-07-07 RX ORDER — FENTANYL CITRATE/PF 50 MCG/ML
50 SYRINGE (ML) INJECTION
Status: DISCONTINUED | OUTPATIENT
Start: 2025-07-07 | End: 2025-07-07 | Stop reason: HOSPADM

## 2025-07-07 RX ORDER — CEFAZOLIN SODIUM 2 G/50ML
2000 SOLUTION INTRAVENOUS ONCE
Status: COMPLETED | OUTPATIENT
Start: 2025-07-07 | End: 2025-07-07

## 2025-07-07 RX ORDER — PROPOFOL 10 MG/ML
INJECTION, EMULSION INTRAVENOUS AS NEEDED
Status: DISCONTINUED | OUTPATIENT
Start: 2025-07-07 | End: 2025-07-07

## 2025-07-07 RX ORDER — HYDROMORPHONE HCL/PF 1 MG/ML
0.5 SYRINGE (ML) INJECTION EVERY 2 HOUR PRN
Status: DISCONTINUED | OUTPATIENT
Start: 2025-07-07 | End: 2025-07-07 | Stop reason: HOSPADM

## 2025-07-07 RX ORDER — PROPOFOL 10 MG/ML
INJECTION, EMULSION INTRAVENOUS CONTINUOUS PRN
Status: DISCONTINUED | OUTPATIENT
Start: 2025-07-07 | End: 2025-07-07

## 2025-07-07 RX ORDER — CEFAZOLIN SODIUM 1 G/50ML
1000 SOLUTION INTRAVENOUS EVERY 8 HOURS
Status: DISCONTINUED | OUTPATIENT
Start: 2025-07-07 | End: 2025-07-07 | Stop reason: HOSPADM

## 2025-07-07 RX ORDER — SODIUM CHLORIDE, SODIUM LACTATE, POTASSIUM CHLORIDE, CALCIUM CHLORIDE 600; 310; 30; 20 MG/100ML; MG/100ML; MG/100ML; MG/100ML
1.5 INJECTION, SOLUTION INTRAVENOUS CONTINUOUS
Status: DISCONTINUED | OUTPATIENT
Start: 2025-07-07 | End: 2025-07-07 | Stop reason: HOSPADM

## 2025-07-07 RX ORDER — ACETAMINOPHEN 325 MG/1
650 TABLET ORAL EVERY 6 HOURS PRN
Status: DISCONTINUED | OUTPATIENT
Start: 2025-07-07 | End: 2025-07-07 | Stop reason: HOSPADM

## 2025-07-07 RX ORDER — SODIUM CHLORIDE, SODIUM LACTATE, POTASSIUM CHLORIDE, CALCIUM CHLORIDE 600; 310; 30; 20 MG/100ML; MG/100ML; MG/100ML; MG/100ML
125 INJECTION, SOLUTION INTRAVENOUS CONTINUOUS
Status: DISCONTINUED | OUTPATIENT
Start: 2025-07-07 | End: 2025-07-07 | Stop reason: HOSPADM

## 2025-07-07 RX ORDER — OXYCODONE HYDROCHLORIDE 5 MG/1
5 TABLET ORAL EVERY 4 HOURS PRN
Status: DISCONTINUED | OUTPATIENT
Start: 2025-07-07 | End: 2025-07-07 | Stop reason: HOSPADM

## 2025-07-07 RX ORDER — ASPIRIN 81 MG/1
81 TABLET, CHEWABLE ORAL 2 TIMES DAILY
Status: DISCONTINUED | OUTPATIENT
Start: 2025-07-08 | End: 2025-07-07 | Stop reason: HOSPADM

## 2025-07-07 RX ORDER — ONDANSETRON 2 MG/ML
INJECTION INTRAMUSCULAR; INTRAVENOUS AS NEEDED
Status: DISCONTINUED | OUTPATIENT
Start: 2025-07-07 | End: 2025-07-07

## 2025-07-07 RX ORDER — DIPHENHYDRAMINE HYDROCHLORIDE 50 MG/ML
12.5 INJECTION, SOLUTION INTRAMUSCULAR; INTRAVENOUS ONCE AS NEEDED
Status: DISCONTINUED | OUTPATIENT
Start: 2025-07-07 | End: 2025-07-07 | Stop reason: HOSPADM

## 2025-07-07 RX ORDER — GABAPENTIN 300 MG/1
300 CAPSULE ORAL
Status: DISCONTINUED | OUTPATIENT
Start: 2025-07-07 | End: 2025-07-07 | Stop reason: HOSPADM

## 2025-07-07 RX ORDER — METOCLOPRAMIDE HYDROCHLORIDE 5 MG/ML
10 INJECTION INTRAMUSCULAR; INTRAVENOUS ONCE AS NEEDED
Status: DISCONTINUED | OUTPATIENT
Start: 2025-07-07 | End: 2025-07-07 | Stop reason: HOSPADM

## 2025-07-07 RX ORDER — AMLODIPINE BESYLATE 5 MG/1
5 TABLET ORAL DAILY
Status: DISCONTINUED | OUTPATIENT
Start: 2025-07-08 | End: 2025-07-07 | Stop reason: HOSPADM

## 2025-07-07 RX ORDER — ALBUTEROL SULFATE 0.83 MG/ML
2.5 SOLUTION RESPIRATORY (INHALATION) ONCE AS NEEDED
Status: DISCONTINUED | OUTPATIENT
Start: 2025-07-07 | End: 2025-07-07 | Stop reason: HOSPADM

## 2025-07-07 RX ORDER — TRANEXAMIC ACID 10 MG/ML
1000 INJECTION, SOLUTION INTRAVENOUS ONCE
Status: COMPLETED | OUTPATIENT
Start: 2025-07-07 | End: 2025-07-07

## 2025-07-07 RX ORDER — ONDANSETRON 2 MG/ML
4 INJECTION INTRAMUSCULAR; INTRAVENOUS EVERY 6 HOURS PRN
Status: DISCONTINUED | OUTPATIENT
Start: 2025-07-07 | End: 2025-07-07 | Stop reason: HOSPADM

## 2025-07-07 RX ORDER — FENOFIBRATE 145 MG/1
145 TABLET, FILM COATED ORAL DAILY
Status: DISCONTINUED | OUTPATIENT
Start: 2025-07-07 | End: 2025-07-07 | Stop reason: HOSPADM

## 2025-07-07 RX ORDER — MAGNESIUM HYDROXIDE 1200 MG/15ML
LIQUID ORAL AS NEEDED
Status: DISCONTINUED | OUTPATIENT
Start: 2025-07-07 | End: 2025-07-07 | Stop reason: HOSPADM

## 2025-07-07 RX ORDER — CHLORHEXIDINE GLUCONATE ORAL RINSE 1.2 MG/ML
15 SOLUTION DENTAL ONCE
Status: COMPLETED | OUTPATIENT
Start: 2025-07-07 | End: 2025-07-07

## 2025-07-07 RX ORDER — MAGNESIUM HYDROXIDE/ALUMINUM HYDROXICE/SIMETHICONE 120; 1200; 1200 MG/30ML; MG/30ML; MG/30ML
30 SUSPENSION ORAL EVERY 6 HOURS PRN
Status: DISCONTINUED | OUTPATIENT
Start: 2025-07-07 | End: 2025-07-07 | Stop reason: HOSPADM

## 2025-07-07 RX ORDER — INSULIN GLARGINE 100 [IU]/ML
32 INJECTION, SOLUTION SUBCUTANEOUS
Status: DISCONTINUED | OUTPATIENT
Start: 2025-07-07 | End: 2025-07-07 | Stop reason: HOSPADM

## 2025-07-07 RX ORDER — CALCIUM CARBONATE 500 MG/1
1000 TABLET, CHEWABLE ORAL DAILY PRN
Status: DISCONTINUED | OUTPATIENT
Start: 2025-07-07 | End: 2025-07-07 | Stop reason: HOSPADM

## 2025-07-07 RX ORDER — OXYCODONE HYDROCHLORIDE 10 MG/1
10 TABLET ORAL EVERY 4 HOURS PRN
Status: DISCONTINUED | OUTPATIENT
Start: 2025-07-07 | End: 2025-07-07 | Stop reason: HOSPADM

## 2025-07-07 RX ORDER — HYDROMORPHONE HCL/PF 1 MG/ML
0.5 SYRINGE (ML) INJECTION
Status: DISCONTINUED | OUTPATIENT
Start: 2025-07-07 | End: 2025-07-07 | Stop reason: HOSPADM

## 2025-07-07 RX ORDER — ATORVASTATIN CALCIUM 40 MG/1
40 TABLET, FILM COATED ORAL
Status: DISCONTINUED | OUTPATIENT
Start: 2025-07-07 | End: 2025-07-07 | Stop reason: HOSPADM

## 2025-07-07 RX ORDER — SODIUM CHLORIDE, SODIUM LACTATE, POTASSIUM CHLORIDE, CALCIUM CHLORIDE 600; 310; 30; 20 MG/100ML; MG/100ML; MG/100ML; MG/100ML
20 INJECTION, SOLUTION INTRAVENOUS CONTINUOUS
Status: DISCONTINUED | OUTPATIENT
Start: 2025-07-07 | End: 2025-07-07 | Stop reason: HOSPADM

## 2025-07-07 RX ORDER — OXYCODONE HYDROCHLORIDE 5 MG/1
5 TABLET ORAL EVERY 4 HOURS PRN
Qty: 30 TABLET | Refills: 0 | Status: SHIPPED | OUTPATIENT
Start: 2025-07-07

## 2025-07-07 RX ADMIN — OXYCODONE HYDROCHLORIDE 5 MG: 5 TABLET ORAL at 09:43

## 2025-07-07 RX ADMIN — FENTANYL CITRATE 25 MCG: 50 INJECTION INTRAMUSCULAR; INTRAVENOUS at 07:51

## 2025-07-07 RX ADMIN — HYDROMORPHONE HYDROCHLORIDE 0.5 MG: 1 INJECTION, SOLUTION INTRAMUSCULAR; INTRAVENOUS; SUBCUTANEOUS at 10:25

## 2025-07-07 RX ADMIN — DEXAMETHASONE SODIUM PHOSPHATE 10 MG: 10 INJECTION, SOLUTION INTRAMUSCULAR; INTRAVENOUS at 07:40

## 2025-07-07 RX ADMIN — TRANEXAMIC ACID 1000 MG: 10 INJECTION, SOLUTION INTRAVENOUS at 08:45

## 2025-07-07 RX ADMIN — PROPOFOL 60 MG: 10 INJECTION, EMULSION INTRAVENOUS at 07:38

## 2025-07-07 RX ADMIN — CEFAZOLIN SODIUM 2000 MG: 2 SOLUTION INTRAVENOUS at 07:30

## 2025-07-07 RX ADMIN — FENTANYL CITRATE 25 MCG: 50 INJECTION INTRAMUSCULAR; INTRAVENOUS at 07:43

## 2025-07-07 RX ADMIN — SODIUM CHLORIDE, SODIUM LACTATE, POTASSIUM CHLORIDE, AND CALCIUM CHLORIDE: .6; .31; .03; .02 INJECTION, SOLUTION INTRAVENOUS at 08:45

## 2025-07-07 RX ADMIN — MEPIVACAINE HYDROCHLORIDE 3.5 ML: 15 INJECTION, SOLUTION EPIDURAL; INFILTRATION at 07:36

## 2025-07-07 RX ADMIN — CHLORHEXIDINE GLUCONATE 15 ML: 1.2 SOLUTION ORAL at 06:30

## 2025-07-07 RX ADMIN — SODIUM CHLORIDE, SODIUM LACTATE, POTASSIUM CHLORIDE, AND CALCIUM CHLORIDE 20 ML/HR: .6; .31; .03; .02 INJECTION, SOLUTION INTRAVENOUS at 06:26

## 2025-07-07 RX ADMIN — FENTANYL CITRATE 50 MCG: 50 INJECTION INTRAMUSCULAR; INTRAVENOUS at 09:56

## 2025-07-07 RX ADMIN — FENTANYL CITRATE 50 MCG: 50 INJECTION INTRAMUSCULAR; INTRAVENOUS at 09:48

## 2025-07-07 RX ADMIN — FENTANYL CITRATE 25 MCG: 50 INJECTION INTRAMUSCULAR; INTRAVENOUS at 08:17

## 2025-07-07 RX ADMIN — PROPOFOL 80 MCG/KG/MIN: 10 INJECTION, EMULSION INTRAVENOUS at 07:38

## 2025-07-07 RX ADMIN — ONDANSETRON 4 MG: 2 INJECTION INTRAMUSCULAR; INTRAVENOUS at 07:40

## 2025-07-07 RX ADMIN — LIDOCAINE HYDROCHLORIDE 30 MG: 10 INJECTION, SOLUTION EPIDURAL; INFILTRATION; INTRACAUDAL; PERINEURAL at 07:38

## 2025-07-07 RX ADMIN — PROPOFOL 20 MG: 10 INJECTION, EMULSION INTRAVENOUS at 07:43

## 2025-07-07 RX ADMIN — FENTANYL CITRATE 25 MCG: 50 INJECTION INTRAMUSCULAR; INTRAVENOUS at 07:55

## 2025-07-07 RX ADMIN — TRANEXAMIC ACID 1000 MG: 10 INJECTION, SOLUTION INTRAVENOUS at 07:37

## 2025-07-07 RX ADMIN — SODIUM CHLORIDE, SODIUM LACTATE, POTASSIUM CHLORIDE, AND CALCIUM CHLORIDE: .6; .31; .03; .02 INJECTION, SOLUTION INTRAVENOUS at 06:20

## 2025-07-07 NOTE — PHYSICAL THERAPY NOTE
PT EVALUATION    Pt. Name: German Louie   Pt. Age: 64 y.o.  MRN: [id number]  LENGTH OF STAY: 0    Problem List[1]    Admitting Diagnoses:   Primary osteoarthritis of both hips [M16.0]    Past Medical History[2]    Past Surgical History[3]    Imaging Studies:  XR hip/pelv 1 vw left if performed    (Results Pending)   XR hip/pelv 1 vw left if performed    (Results Pending)        07/07/25 1121   PT Last Visit   PT Visit Date 07/07/25   Note Type   Note type Evaluation   Pain Assessment   Pain Assessment Tool 0-10   Pain Score 3   Pain Location/Orientation Orientation: Left;Location: Hip   Hospital Pain Intervention(s) Repositioned;Ambulation/increased activity;Elevated;Emotional support;Rest   Restrictions/Precautions   Weight Bearing Precautions Per Order Yes   LLE Weight Bearing Per Order WBAT   Other Precautions WBS;THR;Fall Risk;Pain  (anterior hip approach)   Home Living   Type of Home House   Home Layout Multi-level;Stairs to enter with rails;Bed/bath upstairs;Performs ADLs on one level  (2+1 DIANA, FF to 2nd floor w/ R rail)   Bathroom Shower/Tub Walk-in shower   Bathroom Toilet Standard   Bathroom Equipment Shower chair   Home Equipment Walker;Cane   Prior Function   Level of [place] Independent with ADLs;Independent with functional mobility;Independent with IADLS  (w/ SPC PRN)   Lives With Spouse   Receives Help From Family   IADLs Independent with driving;Independent with meal prep;Independent with medication management   Falls in the last 6 months 1 to 4   Vocational Retired   General   Family/Caregiver Present Yes   Cognition   Overall Cognitive Status WFL   Arousal/Participation Alert   Orientation Level Oriented X4   Following Commands Follows one step commands without difficulty   Subjective   Subjective Pt agreeable to PT/OT evaluation.   RUE Assessment   RUE Assessment   (refer to OT)   LUE Assessment   LUE Assessment   (refer to OT)   RLE Assessment   RLE Assessment WFL  (4/5 grossly)    LLE Assessment   LLE Assessment X   Strength LLE   L Knee Flexion 3/5   L Knee Extension 3/5   L Ankle Dorsiflexion 4+/5   L Ankle Plantar Flexion 4+/5   Light Touch   RLE Light Touch Grossly intact   LLE Light Touch Grossly intact   Bed Mobility   Additional Comments Pt greeted OOB in chair.   Transfers   Sit to Stand 5  Supervision   Additional items Armrests;Increased time required;Verbal cues  (w/ RW)   Stand to Sit 5  Supervision   Additional items Armrests;Increased time required;Verbal cues  (w/ RW)   Toilet transfer   (pt stood to void)   Additional Comments cues for hand placement   Ambulation/Elevation   Gait pattern Improper Weight shift;Antalgic;Decreased foot clearance;Decreased L stance;Step through pattern   Gait Assistance 5  Supervision   Additional items Verbal cues   Assistive Device Rolling walker   Distance 90'x1; 100'x1   Stair Management Assistance 5  Supervision   Additional items Verbal cues   Stair Management Technique One rail R;No rails;Step to pattern;Foreward;Nonreciprocal;With walker   Number of Stairs   (1x4 stair trainers; 1x 6 in landing step)   Ambulation/Elevation Additional Comments cues for improved quad contraction   Balance   Static Sitting Good   Dynamic Sitting Fair +   Static Standing Fair  (w/ RW)   Dynamic Standing Fair -  (w/ RW)   Ambulatory Fair -  (w/ RW)   Activity Tolerance   Activity Tolerance Patient tolerated treatment well   Medical Staff Made Aware OT Juanita   Nurse Made Aware RN Dulce   Assessment   Prognosis Good   Problem List Decreased strength;Decreased range of motion;Decreased endurance;Impaired balance;Decreased mobility;Pain   Assessment Pt. 64 y.o.male presents for elective surgery. Past medical hx includes DM, HTN, HLD, CVA, hemiparesis, SOB, hx of lumbar surgery. Pt admitted for Primary osteoarthritis of left hip w/ Primary osteoarthritis of both hips (M16.0). S/p L elective THR (anterior) POD #0. Pt referred to PT for functional mobility evaluation  & D/C planning w/ orders of activity as tolerated. WBAT LLE. PTA, pt reports being I w/ SPC. Personal factors affecting pt at time of IE include: bed/bath on 2nd floor, decreased independence in ADLs/IADLs, lack of insight to deficits, steps to enter, two story home, and use of AD . During evaluation, deficits included dec mobility, balance, ambulation. Required S for sit<>stand, ambulation, and stair navigation. Pt able to ambulate 90' and 100' with RW in unit. Antalgic step through gait with no gross LOB noted. Able to perform nonreciprocal stair navigation with use of unilateral hand rail and use of RW on landing steps to simulate home setups. Reviewed HEP and provided handout. All questions and concerns addressed at this time. Pt demonstrated dec endurance and tolerance to activity. Denies reports of dizziness or SOB t/o session. Pt was educated on fall precautions and reinforced w/ good understanding. Pt would benefit from continued PT to address deficits as defined above and maximize level of independence with functional mobility and safety. Based on pt presentation and impaired function, pt would benefit from level III, (minimum resource intensity) at D/C. The patient's AM-PAC Basic Mobility Inpatient Short Form Raw Score is 23. A Raw score of greater than 16 suggests the patient may benefit from discharge to home. Please also refer to the recommendation of the Physical Therapist for safe discharge planning. Nsg staff to continue to mobilized pt (OOB in chair for all meals & ambulate in room/unit) as tolerated to prevent further decline in function. Nsg notified. Co-eval performed to complete this PT evaluation for the pts best interest given pts medical complexity and functional level.   Barriers to Discharge None   Barriers to Discharge Comments From PT stand point, pt cleared functionally for D/C when medically cleared.   Goals   Patient Goals to go home   STG Expiration Date 07/14/25   Short Term Goal #1  1) Inc overall LE strength by 1/2 MMT grade to improve functional mobility; 2) Pt will demonstrate improved bed mobility with mod I to dec caregiver burden; 3) Pt will demonstrate improved transfers w/ mod I for inc safety; 4) Pt will be able to amb w/ mod I >150' w/ RW for household distances to inc safety and dec caregiver burden; 5) Pt will be able to navigate stairs with mod I to dec caregiver burden and inc safety with functional mobility; 6) Improve general balance by 1 grade to inc safety; 7) PT for ongoing patient and caregiver education   PT Treatment Day 0   Plan   Treatment/Interventions Functional transfer training;LE strengthening/ROM;Elevations;Therapeutic exercise;Endurance training;Patient/family training;Bed mobility;Gait training;Spoke to nursing;OT   PT Frequency Twice a day  (PRN)   Discharge Recommendation   Rehab Resource Intensity Level, PT III (Minimum Resource Intensity)   Equipment Recommended Walker  (pt owns)   AM-PAC Basic Mobility Inpatient   Turning in Flat Bed Without Bedrails 4   Lying on Back to Sitting on Edge of Flat Bed Without Bedrails 4   Moving Bed to Chair 4   Standing Up From Chair Using Arms 4   Walk in Room 4   Climb 3-5 Stairs With Railing 3   Basic Mobility Inpatient Raw Score 23   Basic Mobility Standardized Score 50.88   [facility] Highest Level Of Mobility   -HLM Goal 7: Walk 25 feet or more   JH-HLM Achieved 7: Walk 25 feet or more   Exercises   TKR   (Reviewed HEP and provided handout. All questions and concerns addressed at this time.)   End of Consult   Patient Position at End of Consult Bedside chair;All needs within reach     Hx/personal factors: co-morbidities, inaccessible home, use of AD, pain, and fall risk, coping styles, social background, past experience, behavior pattern  Examination: dec mobility, dec balance, dec endurance, dec amb, risk for falls, pain, assessed body system, balance, endurance, amb, D/C disposition & fall risk, impairements in  locomotion, musculoskeletal, balance, endurance, posture, coordination, assessed cognition, impairments in systems including multiple body structures involved; musculoskeletal (ROM, strength, posture, BMI), neuromuscular (balance,locomotion, gait, transfers, motor control and learning, sensation), joint integrity, integumentary (skin integrity, presence of scars or wounds), cardiopulmonary (vitals, edema); activity limitations (difficulties executing an action); participation restrictions (problems associated w involvement in life situations)  Clinical: unpredictable (ongoing medical status, risk for falls, imaging test/result pending, POD #0, and pain mgt)  Complexity: high      Sharda Rubi, PT         [1]   Patient Active Problem List  Diagnosis    Type 2 diabetes mellitus with microalbuminuria, with long-term current use of insulin (HCC)    Essential hypertension    Hyperlipidemia    Benign prostatic hyperplasia    Arthritis    Lumbar degenerative disc disease    Central pain syndrome    Cerebrovascular accident (HCC)    Anxiety    Abnormal wellness exam    Hemiparesis affecting left side as late effect of cerebrovascular accident (CVA) (HCC)    Nausea    SOB (shortness of breath) on exertion    History of stroke    Pre-op examination    Radiculopathy, cervical    Gait abnormality    Tinea corporis    Hereditary deficiency of other clotting factors (HCC)    Claudication (HCC)    Dermatitis    Primary osteoarthritis of left hip   [2]   Past Medical History:  Diagnosis Date    Arthrodesis status 04/19/2024    Diabetes mellitus (HCC)     HLD (hyperlipidemia)     Hypertension     PONV (postoperative nausea and vomiting)     Stroke (HCC)     dr from [place], told him he had 2 minor strokes in  2023    Suspected cerebrovascular accident (CVA) 08/29/2023   [3]   Past Surgical History:  Procedure Laterality Date    ANKLE SURGERY Right     BACK SURGERY      L4-L5    COLONOSCOPY      NECK SURGERY      2015, 2023

## 2025-07-07 NOTE — PLAN OF CARE
Problem: OCCUPATIONAL THERAPY ADULT  Goal: Performs self-care activities at highest level of function for planned discharge setting.  See evaluation for individualized goals.  Description: Treatment Interventions: ADL retraining, Functional transfer training, Endurance training, Patient/family training, Equipment evaluation/education, Compensatory technique education, Continued evaluation, Energy conservation, Activityengagement          See flowsheet documentation for full assessment, interventions and recommendations.   Note: Limitation: Decreased ADL status, Decreased endurance, Decreased high-level ADLs, Decreased self-care trans  Prognosis: Good  Assessment: Pt is a 64 y.o. male seen for OT evaluation s/p adm to [facility] on 7/7/2025 w/ Primary osteoarthritis of left hip . Comorbidities affecting pt’s functional performance include a significant PMH of arthrodesis status, DM, HLD, HTN, PONV, hx of stroke, arthritis, anxiety. Pt with active OT orders and activity orders for Activity beginning POD #0. WBAT LLE. Pt lives w/ spouse in a multilevel house with 1 DIANA. Pt has first floor setup with walkin shower and couch that can turn into a bed. Pt has shower chair and standard toilet. At baseline, pt was independent with all ADLs/IADLs. Pt completed don of pants with S, then standing to pull over waist with use of RW. Verbal cues for proper technique. Pt completed don of shirt with S seated. Pt completed functional mobility functional household distance with use of RW and S. Pt completed toileting standing with use of RW over toilet for stability. Pt educated on all ADLs/IADLs, transfers, and LE Management. Cues for hand placement and safety with RW. Upon evaluation, pt currently requires S for UB ADLs, S for LB ADLs, S for toileting, S for bed mobility, S for functional mobility, and S for transfers 2* the following deficits impacting occupational performance: weakness, decreased strength , decreased  balance, decreased activity tolerance, increased pain, and orthopedic restrictions. These impairments, as well at pt’s personal factors of: DIANA home environment, steps within home environment, difficulty performing ADLs, difficulty performing IADLs, difficulty performing transfers/mobility, WBS, fall risk , and new use of AD for functional transfers/mobility limit pt’s ability to safely engage in all baseline areas of occupation. Based on the aforementioned OT evaluation, functional performance deficits, and assessments, pt has been identified as a high complexity evaluation. Pt to continue to benefit from continued acute OT services during hospital stay to address defined deficits and to maximize level of functional independence in the following Occupational Performance areas: grooming, bathing/shower, toilet hygiene, dressing, health maintenance, functional mobility, community mobility, clothing management, cleaning, household maintenance, and job performance/volunteering. From OT standpoint, recommend No post-acute rehabilitation needs upon D/C. OT will continue to follow pt 3-5x/wk.     Rehab Resource Intensity Level, OT: No post-acute rehabilitation needs

## 2025-07-07 NOTE — ANESTHESIA PROCEDURE NOTES
Spinal Block    Patient location during procedure: OR  Start time: 7/7/2025 7:36 AM  Reason for block: procedure for pain and at surgeon's request  Staffing  Performed by: Emma Hall CRNA  Authorized by: Oswaldo Jeffrey MD    Preanesthetic Checklist  Completed: patient identified, IV checked, site marked, risks and benefits discussed, surgical consent, monitors and equipment checked, pre-op evaluation and timeout performed  Spinal Block  Patient position: sitting  Prep: ChloraPrep and site prepped and draped  Patient monitoring: frequent blood pressure checks, continuous pulse ox and heart rate  Approach: midline  Location: L3-4  Needle  Needle type: Pencan   Needle gauge: 24 G  Needle length: 4 in  Assessment  Sensory level: T4  Injection Assessment:  negative aspiration for heme, no paresthesia on injection and positive aspiration for clear CSF.  Post-procedure:  site cleaned

## 2025-07-07 NOTE — ANESTHESIA PREPROCEDURE EVALUATION
Procedure:  ARTHROPLASTY LEFT HIP TOTAL ANTERIOR; SAME DAY (Left: Hip)    ECHO (03/08/24):    Left Ventricle: Left ventricle is small. There is moderate concentric hypertrophy. Systolic function is hyperdynamic with an ejection fraction over 65%. There is grade I (mild) diastolic dysfunction.     Left Atrium: Left atrium cavity is moderately dilated.     Tricuspid Valve: There is mild regurgitation.     The estimated pulmonary artery systolic pressure is 15.0 mmHg.     Relevant Problems   ANESTHESIA (within normal limits)      CARDIO   (+) Essential hypertension   (+) Hyperlipidemia      ENDO   (+) Type 2 diabetes mellitus with microalbuminuria, with long-term current use of insulin (HCC)      GI/HEPATIC (within normal limits)      /RENAL   (+) Benign prostatic hyperplasia      GYN (within normal limits)      HEMATOLOGY   (+) Hereditary deficiency of other clotting factors (HCC)      MUSCULOSKELETAL   (+) Arthritis   (+) Primary osteoarthritis of left hip      NEURO/PSYCH   (+) Anxiety   (+) Cerebrovascular accident (HCC)      Lab Results   Component Value Date    WBC 5.18 06/10/2025    HGB 14.2 06/10/2025    HCT 43.7 06/10/2025    MCV 90 06/10/2025     06/10/2025     Lab Results   Component Value Date    SODIUM 142 06/10/2025    K 4.4 06/10/2025     06/10/2025    CO2 28 06/10/2025    AGAP 9 06/10/2025    BUN 9 06/10/2025    CREATININE 0.94 06/10/2025    GLUC 177 (H) 06/10/2025    GLUF 80 03/21/2025    CALCIUM 8.8 06/10/2025    AST 12 (L) 06/10/2025    ALT 12 06/10/2025    ALKPHOS 78 06/10/2025    TP 6.7 06/10/2025    TBILI 0.48 06/10/2025    EGFR 84 06/10/2025     Lab Results   Component Value Date    PTT 38 (H) 06/10/2025     Lab Results   Component Value Date    INR 0.96 06/10/2025    INR 1.0 05/28/2024    PROTIME 13.0 06/10/2025       Physical Exam    Airway     Mallampati score: II  TM Distance: >3 FB  Neck ROM: full      Cardiovascular  Cardiovascular exam normal    Dental   No notable dental hx      Pulmonary  Pulmonary exam normal     Neurological      Other Findings        Anesthesia Plan  ASA Score- 3     Anesthesia Type- spinal with ASA Monitors.         Additional Monitors:     Airway Plan: natural airway.    Comment: Spinal with IV sedation, GA/ETT as backup.       Plan Factors-Exercise tolerance (METS): >4 METS.    Chart reviewed. EKG reviewed. Imaging results reviewed. Existing labs reviewed. Patient summary reviewed.      Patient did not smoke on day of surgery.    Obstructive sleep apnea risk education given perioperatively.        Induction- intravenous.    Postoperative Plan- Plan for postoperative opioid use.   Monitoring Plan - Monitoring plan - standard ASA monitoring  Post Operative Pain Plan - non-opiod analgesics, plan for postoperative opioid use and multimodal analgesia        Informed Consent- Anesthetic plan and risks discussed with patient.  I personally reviewed this patient with the CRNA. Discussed and agreed on the Anesthesia Plan with the CRNA..      NPO Status:  Vitals Value Taken Time   Date of last liquid 07/07/25 07/07/25 06:10   Time of last liquid 0400 07/07/25 06:10   Date of last solid 07/06/25 07/07/25 06:10   Time of last solid 1730 07/07/25 06:10

## 2025-07-07 NOTE — PLAN OF CARE
Problem: PHYSICAL THERAPY ADULT  Goal: Performs mobility at highest level of function for planned discharge setting.  See evaluation for individualized goals.  Description: Treatment/Interventions: Functional transfer training, LE strengthening/ROM, Elevations, Therapeutic exercise, Endurance training, Patient/family training, Bed mobility, Gait training, Spoke to nursing, OT  Equipment Recommended: Walker (pt owns)       See flowsheet documentation for full assessment, interventions and recommendations.  Note: Prognosis: Good  Problem List: Decreased strength, Decreased range of motion, Decreased endurance, Impaired balance, Decreased mobility, Pain  Assessment: Pt. 64 y.o.male presents for elective surgery. Past medical hx includes DM, HTN, HLD, CVA, hemiparesis, SOB, hx of lumbar surgery. Pt admitted for Primary osteoarthritis of left hip w/ Primary osteoarthritis of both hips (M16.0). S/p L elective THR (anterior) POD #0. Pt referred to PT for functional mobility evaluation & D/C planning w/ orders of activity as tolerated. WBAT LLE. PTA, pt reports being I w/ SPC. Personal factors affecting pt at time of IE include: bed/bath on 2nd floor, decreased independence in ADLs/IADLs, lack of insight to deficits, steps to enter, two story home, and use of AD . During evaluation, deficits included dec mobility, balance, ambulation. Required S for sit<>stand, ambulation, and stair navigation. Pt able to ambulate 90' and 100' with RW in unit. Antalgic step through gait with no gross LOB noted. Able to perform nonreciprocal stair navigation with use of unilateral hand rail and use of RW on landing steps to simulate home setups. Reviewed HEP and provided handout. All questions and concerns addressed at this time. Pt demonstrated dec endurance and tolerance to activity. Denies reports of dizziness or SOB t/o session. Pt was educated on fall precautions and reinforced w/ good understanding. Pt would benefit from continued PT  to address deficits as defined above and maximize level of independence with functional mobility and safety. Based on pt presentation and impaired function, pt would benefit from level III, (minimum resource intensity) at D/C. The patient's AM-PAC Basic Mobility Inpatient Short Form Raw Score is 23. A Raw score of greater than 16 suggests the patient may benefit from discharge to home. Please also refer to the recommendation of the Physical Therapist for safe discharge planning. Nsg staff to continue to mobilized pt (OOB in chair for all meals & ambulate in room/unit) as tolerated to prevent further decline in function. Nsg notified. Co-eval performed to complete this PT evaluation for the pts best interest given pts medical complexity and functional level.  Barriers to Discharge: None  Barriers to Discharge Comments: From PT stand point, pt cleared functionally for D/C when medically cleared.  Rehab Resource Intensity Level, PT: III (Minimum Resource Intensity)    See flowsheet documentation for full assessment.

## 2025-07-07 NOTE — OP NOTE
OPERATIVE REPORT  PATIENT NAME: German Louie Jr.  : 1960  MRN: [id number]  Pt Location:  [facility] OR ROOM 03    Surgery Date: 2025    Surgeons and Role:     * Aric Hendrix MD - Primary     * Arthur Reyes MD - Assisting     * Oscar Cobian PA-C - Assisting     Preop Diagnosis:  Primary osteoarthritis of both hips [M16.0]    Post-Op Diagnosis Codes:     * Primary osteoarthritis of both hips [M16.0]    Procedure(s):  Left - ARTHROPLASTY LEFT HIP TOTAL ANTERIOR; SAME DAY    Specimens:  * No specimens in log *    Estimated Blood Loss:   Minimal    Drains:  * No LDAs found *    Anesthesia Type:   Choice     Operative Indications:  Primary osteoarthritis of both hips [M16.0]    See clinic note for complete list of indications    Operative Findings:  Significant cystic degeneration of the femoral head and neck with associated subchondral collapse suspicious for possible avascular necrosis    Complications:   None    Hip Approach: Direct anterior    Chronic Narcotic Use:  No      Procedure and Technique:  IMPLANTS:  1. [employer] G7 cup, 56 mm outer diameter.  2. [employer] Z1 Collared size 9 H/O femoral stem.  3. [employer] neutral acetabular liner, inner diameter 36mm  4. Ceramic femoral head 36mm +/- 0mm        DESCRIPTION OF OPERATION:   After adequate anesthesia was induced, the patient was placed on the [place] table in the supine position. Care was taken to pad all bony prominences including the boots and perineal post. Two 10/15 drapes were used to demarcate the surgical field. The left hip, thigh, and groin were then prepped and draped in the usual sterile fashion. Perpendicular lines on the skin were placed to aid skin closure. Ioban was placed on all exposed skin. A time out was performed verifying the correct patient, correct limb, and correct procedure and all attendees in the room were in agreement with proceeding.     An incision was made with a #10 scalpel beginning roughly 2 cm lateral  and distal to the ASIS and extending for a length of roughly 10 cm directed toward the fibular head.  Subcutaneous fat was also incised with a #10 scalpel to the level of the tensor fascia.  A new #10 scalpel was then used to incise the fascia of the TFL in line with the incision.  The TFL muscle belly was bluntly dissected from the TFL fascia developing a plane between the TFL and sartorius.  A large Gelpi retractor was placed and electrocautery was used to isolate the a sending branches of the lateral femoral circumflex artery.  Once these arterial branches had been coagulated, the deep fascia of the TFL compartment was then incised with electrocautery creating a plane between the gluteus medius and the rectus femoris.  Precapsular fat was then excised being sure to maintain hemostasis and electrocautery was used to perform an anterior capsulectomy.  An oscillating saw was then used to create a femoral neck osteotomy from the piriformis fossa to roughly 1 fingerbreadth superior to the lesser trochanter.  A corkscrew was inserted up the medullary canal of the femoral head and the femoral head was removed without trauma to the surrounding tissues.  Anterior and posterior acetabular retractors were then placed and a long handled knife equipped with a #10 scalpel blade was used to excise any remaining labrum.  The acetabulum was then sequentially reamed to a size which was appropriate and a final acetabular cup was impacted under fluoroscopic guidance.  The cup was found to have excellent stability and a final acetabular liner was secured into the cup.    Our attention then turned to the femur.  Remaining lateral capsule was excised being sure to preserve the abductors as well as the short external rotators of the hip.  Using a combination of retractors and bone hook, the proximal femur was delivered anteriorly allowing for appropriate visualization.  The lateral femoral neck was removed with a box cutting osteotome,  the canal was sounded and sequential broaching began.  Once a broach of appropriate size was needed, a trial attached and the hip was reduced.  An x-ray of the pelvis was obtained and navigation software was utilized to confirm implant positioning as well as limb length quality.  Finding that the components were in acceptable position and that limb length had been restored, the hip was once again dislocated and the trial femoral components removed.      A final femoral stem was impacted down the femoral shaft and the final femoral head was impacted onto a dry trunnion.  The hip was then reduced. Local anesthetic was then infiltrated to the hip capsule and pericapsular tissues.    The wound was thoroughly irrigated with dilute Betadine followed by sterile saline.  Vancomycin powder was then introduced into the hip joint and the fascia was closed with a combination of #1 Vicryl interrupted and a running #1 barbed suture.  Skin was closed with 2-0 Vicryl in interrupted fashion and the skin was closed with a 3-0 Monocryl and skin adhesive.  A sterile dressing was applied.  At this point, anesthesia was discontinued, the patient was transferred to a stretcher and transported to the PACU in stable condition.    I was present for, and performed all critical portions of the case          SIGNATURE: Aric Hendrix MD  DATE: July 7, 2025  TIME: 9:07 AM

## 2025-07-07 NOTE — DISCHARGE INSTR - AVS FIRST PAGE
Aric Hendrix MD  Adult Reconstruction Specialist   [facility]  Office Phone: [phone]    Discharge Instructions - Hip Replacement        What to Expect/Activity  You are weight bearing as tolerated to your operative leg unless otherwise instructed. Use your walker or crutches as needed. It is important to get up and walk for 10 minutes every hour, if possible.  Initial recovery therapy is focused on walking. If in your follow-up a referral to formal physical therapy is required, this will be provided based on your recovery. We do not begin any formal physical therapy for the first 2 weeks after surgery. We will determine this at your first clinic visit.   You may sleep however you would like. Many patients feel more comfortable with a pillow between their legs and find it uncomfortable to lay on the operative side. If you do roll on that side at night you will not damage the replacement.  You may use a regular or elevated toilet seat, whichever is more comfortable.  We do not routinely require any specific precautions in terms of positioning of your leg and if so this will be taught to you by the physical therapists at the hospital. This means that you can dress as you are comfortable, including shoes and socks. You can bend down carefully to get items from the floor. Let pain be a guide for activity.  Swelling and discomfort causes pain. Elevate your surgical leg on 1-2 pillows placed behind your ankle/calf throughout the day, especially when you are laying down.  Please use ice packs for 20-30 minutes every 1-2 hours for 48-72 hours on the operative hip. Please make sure there is a barrier directly between your skin and your ice/ice pack.  Please use incentive spirometer 10 times per hour while awake   Bruising and swelling is expected and generally not something to be concerned with. This may be noticeable throughout your entire leg.    Dressing/Wound Care/Bathing  There is  a waterproof surgical dressing over your incision that may stay in place until your follow up visit.   You may begin showering 5 days after surgery.   Please pat the dressing dry. If you notice the dressing appears saturated or is starting to come off, please completely remove and replace with a dry dressing.   There may be dried blood around the incision. It is ok to continue showering after removing the dressing but do not scrub the incision. Pat incision dry.  Do not place any creams, ointments or gels on or around the incision.  No baths, swimming or submerging until cleared     Pain Management/Medications  You may resume your usual medications.  Please take the following medications:  Anti-coagulation (blood clot prevention) - Aspirin 81 mg, 1 tablet twice daily for 6 weeks  Antibiotics - none  Pain medication:  Narcotic Medication: Oxycodone 5 mg, 1 tablet every 4-6 hours for severe pain  NSAID (Anti-inflammatory): Celebrex 200 mg, 1 tablet twice a day as needed for mild to moderate pain  Tylenol 1000mg up to three times daily as needed for mild to moderate pain. Do not exceed 3000mg daily   If you have questions or pain concerns, please contact the office. Pain medication cannot remove all post-operative pain.    Follow up/Call if:  The findings of your surgery will be explained to you and your family immediately after surgery. However, in the post-operative period, during recovery from anesthesia you may not fully remember or fully understand what was said. We will go over this again when you return for your post-op appointment.  Please contact Dr. Hendrix's office if you experience the following:  Excessive bleeding (bleeding through your dressing)  Fever greater than 101 degrees F after the first 2 days (a slight fever is normal the first few days after surgery)  Persistent nausea or vomiting  Decreased sensation or discoloration of the operative limb  Pain or swelling that is getting worse out of  proportion and not improved with medication    Office Contact: [phone]

## 2025-07-07 NOTE — OCCUPATIONAL THERAPY NOTE
Occupational Therapy Evaluation     Patient Name: German Loiue Jr.  Today's Date: 7/7/2025  Problem List  Principal Problem:    Primary osteoarthritis of left hip    Past Medical History  Past Medical History[1]  Past Surgical History  Past Surgical History[2]       07/07/25 1100   OT Last Visit   OT Visit Date 07/07/25   Note Type   Note type Evaluation   Additional Comments pt greeted OOB in recliner   Pain Assessment   Pain Assessment Tool 0-10   Pain Score 3   Pain Location/Orientation Orientation: Left;Location: Hip   Hospital Pain Intervention(s) Repositioned;Ambulation/increased activity;Emotional support;Rest   Restrictions/Precautions   Weight Bearing Precautions Per Order Yes   LLE Weight Bearing Per Order WBAT   Other Precautions WBS;THR;Fall Risk;Pain;Hard of hearing  (anterior hip approach)   Home Living   Type of Home House   Home Layout Multi-level;Stairs to enter with rails;Bed/bath upstairs;Performs ADLs on one level  (2+1 DIANA. FF to 2nd floor w/ R rail)   Bathroom Shower/Tub Walk-in shower   Bathroom Toilet Standard   Bathroom Equipment Shower chair   Bathroom Accessibility Accessible   Home Equipment Walker;Cane   Prior Function   Level of [place] Independent with ADLs;Independent with functional mobility;Independent with IADLS   Lives With Spouse   Receives Help From Family   IADLs Independent with driving;Independent with meal prep;Independent with medication management   Falls in the last 6 months 1 to 4  (1x)   Vocational Retired   Comments (+) , use of cane as needed   Lifestyle   Autonomy Independent with all ADLs/IADLs, use of cane as needed   Reciprocal Relationships Spouse   Service to Others Retired   Intrinsic Gratification golfing   General   Family/Caregiver Present Yes   ADL   Where Assessed Chair   Eating Assistance 5  Supervision/Setup   Grooming Assistance 5  Supervision/Setup   UB Bathing Assistance 5  Supervision/Setup   LB Bathing Assistance 5   Supervision/Setup   UB Dressing Assistance 5  Supervision/Setup   LB Dressing Assistance 5  Supervision/Setup   Toileting Assistance  5  Supervision/Setup   Bed Mobility   Supine to Sit Unable to assess   Sit to Supine Unable to assess   Additional Comments pt greeted OOB in recliner   Transfers   Sit to Stand 5  Supervision   Additional items Armrests;Increased time required;Verbal cues  (RW)   Stand to Sit 5  Supervision   Additional items Armrests;Increased time required;Verbal cues  (RW)   Toilet transfer   (standing for voiding with use of RW over toilet)   Additional Comments verbal cues for hand placement and safety with use of RW   Functional Mobility   Functional Mobility 5  Supervision   Additional Comments Pt completed functional mobility functional household distance with use of RW and S   Additional items Rolling walker   Balance   Static Sitting Good   Dynamic Sitting Fair +   Static Standing Fair   Dynamic Standing Fair -   Ambulatory Fair -   Activity Tolerance   Activity Tolerance Patient tolerated treatment well   Medical Staff Made Aware PT Sharda, Pt seen for co-evaluation/treatment with skilled Physical Therapy due to pt's medical complexity, decreased endurance, overall functional level, overall safety, and post surgical day #0.   Nurse Made Aware SHANA PRO Assessment   RUE Assessment WFL   LUE Assessment   LUE Assessment WFL   Hand Function   Gross Motor Coordination Functional   Fine Motor Coordination Functional   Cognition   Overall Cognitive Status WFL   Arousal/Participation Alert;Cooperative   Attention Within functional limits   Orientation Level Oriented X4   Memory Within functional limits   Following Commands Follows all commands and directions without difficulty   Comments Cooperative and motivated   Assessment   Limitation Decreased ADL status;Decreased endurance;Decreased high-level ADLs;Decreased self-care trans   Prognosis Good   Assessment Pt is a 64 y.o. male seen for OT  evaluation s/p adm to [facility] on 7/7/2025 w/ Primary osteoarthritis of left hip . Comorbidities affecting pt’s functional performance include a significant PMH of arthrodesis status, DM, HLD, HTN, PONV, hx of stroke, arthritis, anxiety. Pt with active OT orders and activity orders for Activity beginning POD #0. WBAT LLE. Pt lives w/ spouse in a multilevel house with 1 DIANA. Pt has first floor setup with walkin shower and couch that can turn into a bed. Pt has shower chair and standard toilet. At baseline, pt was independent with all ADLs/IADLs. Pt completed don of pants with S, then standing to pull over waist with use of RW. Verbal cues for proper technique. Pt completed don of shirt with S seated. Pt completed functional mobility functional household distance with use of RW and S. Pt completed toileting standing with use of RW over toilet for stability. Pt educated on all ADLs/IADLs, transfers, and LE Management. Cues for hand placement and safety with RW. Upon evaluation, pt currently requires S for UB ADLs, S for LB ADLs, S for toileting, S for bed mobility, S for functional mobility, and S for transfers 2* the following deficits impacting occupational performance: weakness, decreased strength , decreased balance, decreased activity tolerance, increased pain, and orthopedic restrictions. These impairments, as well at pt’s personal factors of: DIANA home environment, steps within home environment, difficulty performing ADLs, difficulty performing IADLs, difficulty performing transfers/mobility, WBS, fall risk , and new use of AD for functional transfers/mobility limit pt’s ability to safely engage in all baseline areas of occupation. Based on the aforementioned OT evaluation, functional performance deficits, and assessments, pt has been identified as a high complexity evaluation. Pt to continue to benefit from continued acute OT services during hospital stay to address defined deficits and to maximize level  of functional independence in the following Occupational Performance areas: grooming, bathing/shower, toilet hygiene, dressing, health maintenance, functional mobility, community mobility, clothing management, cleaning, household maintenance, and job performance/volunteering. From OT standpoint, recommend No post-acute rehabilitation needs upon D/C. OT will continue to follow pt 3-5x/wk.   Goals   Patient Goals to go home   STG Time Frame 1-3   Short Term Goal #1 Pt will improve activity tolerance to G for min 30 min treatment sessions for increase engagement in functional tasks   Short Term Goal #2 Pt will complete bed mobility at a mod I level w/ G balance/safety demonstrated to decrease caregiver assistance required   Short Term Goal  Pt will complete LB dressing/self care w/ mod I using adaptive device and DME as needed   LTG Time Frame 3-7   Long Term Goal #1 Pt will complete toileting w/ mod I w/ G hygiene/thoroughness using DME as needed   Long Term Goal #2 Pt will improve functional transfers to mod I on/off all surfaces using DME as needed w/ G balance/safety   Long Term Goal Pt will improve functional mobility during ADL/IADL/leisure tasks to mod I using DME as needed w/ G balance/safety   Plan   Treatment Interventions ADL retraining;Functional transfer training;Endurance training;Patient/family training;Equipment evaluation/education;Compensatory technique education;Continued evaluation;Energy conservation;Activityengagement   Goal Expiration Date 07/14/25   OT Treatment Day 0   OT Frequency 3-5x/wk   Discharge Recommendation   Rehab Resource Intensity Level, OT No post-acute rehabilitation needs   Additional Comments  The patient's raw score on the -PAC Daily Activity Inpatient Short Form is 21 . A raw score of greater than or equal to 19 suggests the patient may benefit from discharge to home. Please refer to the recommendation of the Occupational Therapist for safe discharge planning.   AM-PAC Daily  Activity Inpatient   Lower Body Dressing 3   Bathing 3   Toileting 3   Upper Body Dressing 4   Grooming 4   Eating 4   Daily Activity Raw Score 21   Daily Activity Standardized Score (Calc for Raw Score >=11) 44.27   AM-PAC Applied Cognition Inpatient   Following a Speech/Presentation 4   Understanding Ordinary Conversation 4   Taking Medications 4   Remembering Where Things Are Placed or Put Away 4   Remembering List of 4-5 Errands 4   Taking Care of Complicated Tasks 4   Applied Cognition Raw Score 24   Applied Cognition Standardized Score 62.21   End of Consult   Education Provided Yes   Patient Position at End of Consult Bedside chair;All needs within reach   Nurse Communication Nurse aware of consult   End of Consult Comments Pt seated OOB in chair at end of session. Call bell and phone within reach. All needs met and pt reports no further questions for OT at this time.   Juanita Colón, OT              [1]   Past Medical History:  Diagnosis Date    Arthrodesis status 04/19/2024    Diabetes mellitus (HCC)     HLD (hyperlipidemia)     Hypertension     PONV (postoperative nausea and vomiting)     Stroke (HCC)      from duong, told him he had 2 minor strokes in  2023    Suspected cerebrovascular accident (CVA) 08/29/2023   [2]   Past Surgical History:  Procedure Laterality Date    ANKLE SURGERY Right     BACK SURGERY      L4-L5    COLONOSCOPY      NECK SURGERY      2015, 2023

## 2025-07-07 NOTE — INTERVAL H&P NOTE
H&P reviewed. After examining the patient I find no changes in the patients condition since the H&P had been written.    Vitals:    07/07/25 0609   BP: 153/80   Pulse: 83   Resp: (!) 24   Temp: 97.9 °F (36.6 °C)   SpO2: 99%

## 2025-07-07 NOTE — ANESTHESIA POSTPROCEDURE EVALUATION
Post-Op Assessment Note    CV Status:  Stable  Pain Score: 0    Pain management: adequate       Mental Status:  Alert and awake   Hydration Status:  Euvolemic   PONV Controlled:  Controlled   Airway Patency:  Patent     Post Op Vitals Reviewed: Yes    No anethesia notable event occurred.    Staff: Anesthesiologist, CRNA           Last Filed PACU Vitals:  Vitals Value Taken Time   Temp 98.3    Pulse 83 07/07/25 09:09   BP 82/51    Resp 15 07/07/25 09:09   SpO2 93 % 07/07/25 09:09   Vitals shown include unfiled device data.

## 2025-07-07 NOTE — ANESTHESIA POSTPROCEDURE EVALUATION
Post-Op Assessment Note    CV Status:  Stable    Pain management: adequate    Multimodal analgesia used between 6 hours prior to anesthesia start to PACU discharge    Mental Status:  Alert and awake   Hydration Status:  Stable   PONV Controlled:  None   Airway Patency:  Patent  Two or more mitigation strategies used for obstructive sleep apnea   Post Op Vitals Reviewed: Yes    No anethesia notable event occurred.    Staff: Anesthesiologist           Last Filed PACU Vitals:  Vitals Value Taken Time   Temp     Pulse 91 07/07/25 10:02   /89 07/07/25 10:00   Resp 32 07/07/25 10:02   SpO2 98 % 07/07/25 10:02   Vitals shown include unfiled device data.

## 2025-07-08 ENCOUNTER — TELEPHONE (OUTPATIENT)
Dept: OBGYN CLINIC | Facility: HOSPITAL | Age: 65
End: 2025-07-08

## 2025-07-08 NOTE — TELEPHONE ENCOUNTER
"Patient contacted for a postoperative follow up assessment. Patient reports doing \"good.\" He reports ambulating with the RW.  Patient states pain is 5/10, and \"a little better\" then when he left.  Patient denies increase in swelling, he states \"can't really tell, No worse\" and dressing is clean, dry and intact. Patient is icing the site and we discussed postoperative swelling, continuing to ICE areas of pain/swelling, especially after increased activity over the next few weeks.       We reviewed patients AVS medication list. Patient is taking Tylenol 1000mg every 8 hours, Celebrex 200mg BID,  Oxycodone 5mg PRN, ASA 81mg BID (discussed taking for 42 days). We discussed taking an OTC stool softener, he has Miralax at home, he will start that - we discussed taking until going regularly.     Pt states \"I'm really happy so far.\"    Patient denies nausea, vomiting, abdominal pain, chest pain, shortness of breath, fever, dizziness and calf pain. Patient does not have any other questions or concerns at this time. Pt was encouraged to call with any questions, concerns or issues.    "

## 2025-07-11 DIAGNOSIS — M16.12 PRIMARY OSTEOARTHRITIS OF LEFT HIP: Primary | ICD-10-CM

## 2025-07-11 DIAGNOSIS — M16.12 PRIMARY OSTEOARTHRITIS OF LEFT HIP: ICD-10-CM

## 2025-07-11 RX ORDER — OXYCODONE HYDROCHLORIDE 5 MG/1
5 TABLET ORAL EVERY 4 HOURS PRN
Qty: 30 TABLET | Refills: 0 | OUTPATIENT
Start: 2025-07-11

## 2025-07-11 RX ORDER — OXYCODONE HYDROCHLORIDE 5 MG/1
5 TABLET ORAL EVERY 4 HOURS PRN
Qty: 15 TABLET | Refills: 0 | Status: SHIPPED | OUTPATIENT
Start: 2025-07-11

## 2025-07-11 NOTE — TELEPHONE ENCOUNTER
----- Message from Billy Delarosa sent at 4/25/2024 11:58 AM EDT -----  Regarding: ECC Message to Provider  ECC Message to Provider    Relationship to Patient: Self     Additional Information Patient calling about medical papers that was given by the nurse to Dr. Brett Momin , Dr Momin was actually unaware of those papers .Patient needs a feedback for this ASAP   --------------------------------------------------------------------------------------------------------------------------    Call Back Information: OK to leave message on voicemail  Preferred Call Back Number: Phone [phone] (home)   .     Refill must be reviewed and completed by the office or provider. The refill is unable to be approved or denied by the medication management team.    Cannot be delegated  Unable to complete PDMP. Will forward to clinical staff to review.

## 2025-07-11 NOTE — TELEPHONE ENCOUNTER
Reason for call:   [x] Refill   [] Prior Auth  [] Other:     Office:   [] PCP/Provider -   [x] Specialty/Provider -     Medication: oxyCODONE (Roxicodone) 5 immediate release tablet     Dose/Frequency: Take 1 tablet (5 mg total) by mouth every 4 (four) hours as needed for severe pain for up to 30 doses Max Daily Amount: 30 mg     Quantity:  30 tablet     Pharmacy:  [facility] - [place], [address]   Does the patient have enough for 3 days?   [] Yes   [x] No - Send as HP to POD

## 2025-07-14 ENCOUNTER — TELEPHONE (OUTPATIENT)
Dept: OBGYN CLINIC | Facility: HOSPITAL | Age: 65
End: 2025-07-14

## 2025-07-14 DIAGNOSIS — M16.12 PRIMARY OSTEOARTHRITIS OF LEFT HIP: ICD-10-CM

## 2025-07-14 NOTE — TELEPHONE ENCOUNTER
Medication:  PDMP 07/12/2025 07/12/2025 07/11/2025 oxyCODONE HCL (Tablet) 15.0 3 5 MG 37.50 [facility] [employer] 0 / 0 [place]   1 [id number] 07/07/2025 07/07/2025 07/07/2025 oxyCODONE HCL (Tablet) 30.0 5 5 MG 45.0 [facility] [employer]   Refill must be reviewed and completed by the office or provider. The refill is unable to be approved or denied by the medication management team.

## 2025-07-14 NOTE — TELEPHONE ENCOUNTER
Reason for call:   [x] Refill   [] Prior Auth  [] Other:     Office:   [] PCP/Provider -   [x] Specialty/Provider -     Medication: oxyCODONE (Roxicodone) 5 immediate release tablet     Dose/Frequency: : Take 1 tablet (5 mg total) by mouth every 4 (four) hours as needed for severe pain for up to 15 doses Max Daily Amount: 30 mg     Quantity:  15 tablet     Pharmacy: [facility] - [place], [place] - [address] [place] [address] Pharmacy   Does the patient have enough for 3 days?   [] Yes   [x] No - Send as HP to POD

## 2025-07-14 NOTE — TELEPHONE ENCOUNTER
Pt asking for a call from the office or provider to give him a call in regards to pain. Pt unsure if the amount of pain he is having after surgery is normal.   Pt did request another refill for Oxycodone

## 2025-07-15 DIAGNOSIS — M16.12 PRIMARY OSTEOARTHRITIS OF LEFT HIP: Primary | ICD-10-CM

## 2025-07-15 DIAGNOSIS — F41.9 ANXIETY: ICD-10-CM

## 2025-07-15 RX ORDER — OXYCODONE HYDROCHLORIDE 5 MG/1
5 TABLET ORAL EVERY 4 HOURS PRN
Qty: 7 TABLET | Refills: 0 | Status: SHIPPED | OUTPATIENT
Start: 2025-07-15

## 2025-07-15 RX ORDER — OXYCODONE HYDROCHLORIDE 5 MG/1
5 TABLET ORAL EVERY 4 HOURS PRN
Qty: 15 TABLET | Refills: 0 | OUTPATIENT
Start: 2025-07-15

## 2025-07-16 NOTE — TELEPHONE ENCOUNTER
"Spoke to patient, he reports taking oxycodone 5mg 1 tablet every 4hours (as prescribed) and was given 52 tablets since surgery \"10 days ago.\"     Pt states \"1 every 4 hours would have been 60,\" and stating he is \"under\" the actual prescribed recommendation.     He states surgery is \"a lot worse than what I expected to be.\"    He is ambulating with the RW, and taking tylenol and celebrex stating \"does nothing.\"     We discussed oxycodone 5mg, where he states \"its such a low dose.\" We discussed Ice, rest/elevation, Tylenol and Celebrex. He is frustrated that pain medication is \"such an issue\" and \"embarrassed to call us for a few here and there. \"   "

## 2025-07-16 NOTE — TELEPHONE ENCOUNTER
He needs to utilize tylenol and celebrex for pain control. Pain is expected at this point in his recovery and pain medication will not be able to take away all of the pain

## 2025-07-16 NOTE — TELEPHONE ENCOUNTER
Patient has been given 52 tablets of oxycodone since his surgery. I am unable to send an additional refill, which he should be aware of at the time of his last refill.

## 2025-07-16 NOTE — TELEPHONE ENCOUNTER
Patient called to discuss what was going on with the oxycodone order. Please call patient and advise.    # [phone]

## 2025-07-16 NOTE — TELEPHONE ENCOUNTER
Patient called to request a refill for their xanax advised a refill was requested on 7/15/25 and is pending approval. Patient verbalized understanding and is in agreement.     Does the patient have enough for 3 days?   [] Yes   [x] No - Send as HP to POD

## 2025-07-16 NOTE — TELEPHONE ENCOUNTER
Patient is calling back in regard to oxyCODONE (Roxicodone) 5 immediate release tablet and is requesting a refill sent to his pharmacy as he is still dealing with a great deal of pain and only has one tablet left; please advise.

## 2025-07-17 NOTE — TELEPHONE ENCOUNTER
Patient called to request a refill for their alprazolam 0.5 mg advised a refill was requested on 7/16/25 and is pending approval. Patient verbalized understanding and is in agreement.     Does the patient have enough for 3 days?   [] Yes   [x] No - Send as HP to POD      Patient's wife is going to the pharmacy to  her prescriptions and is hoping patient's prescription would be sent in as soon as possible.

## 2025-07-17 NOTE — TELEPHONE ENCOUNTER
Last seen 4/29/25  Has appt 9/30/25     1 GERMAN SERRANO JR 1960 M 32 [id number] [place]      Search Criteria  Name Date of Birth Date Range  German Serrano 1960 07- To 07-  Requester Name Requested Date  ALEXA EDENMICHELL 07- 11:24:00 ([facility])  Summary  Prescriptions  25  Prescribers  5  Pharmacies  1  Drug Classes  Benzodiazepines  14  Stimulants  0  Opioids  11  Muscle Relaxants  0  Opioid Dosage  Total MME for Active Prescriptions  0    Average MME  62.60         Prescriptions  Notifications  1  Prescribers  Pharmacies  MME Graph    Show All     [place]   Drug Categories:      Opioids       Benzodiazepines     Show  10  entries  Search:  Patient Id Prescription # Sold Filled Written Drug Label Qty Days Strength MME* Prescriber Pharmacy Payment REFILL #/Auth State Detail  1 [id number] 07/15/2025 07/15/2025 07/15/2025 oxyCODONE HCL (Tablet) 7.0 2 5 MG 26.25 [employer] 0 / 0 [place]   1 [id number] 07/12/2025 07/12/2025 07/11/2025 oxyCODONE HCL (Tablet) 15.0 3 5 MG 37.50 [employer] 0 / 0 [place]   1 [id number] 07/07/2025 07/07/2025 07/07/2025 oxyCODONE HCL (Tablet) 30.0 5 5 MG 45.0 [employer] 0 / 0 [place]   1 [id number] 06/24/2025 06/24/2025 06/23/2025 ALPRAZolam (Tablet) 45.0 22 0.5 MG NA MARIANNA) [facility] [employer] 0 / 0 [place]   1 [id number] 05/31/2025 05/31/2025 05/30/2025 ALPRAZolam (Tablet) 45.0 22 0.5 MG NA MARIANNA) [facility] [employer] [place] / 0 [place]   1 [id number] 05/08/2025 05/08/2025 05/07/2025 ALPRAZolam (Tablet) 45.0 22 0.5 MG NA MARIANNA) [facility] [employer] 0 / 0 [place]   1 [id number] 04/15/2025 04/14/2025 04/11/2025 ALPRAZolam (Tablet) 45.0 22 0.5 MG NA MARIANNA) [facility] [employer] 0 / 0 [place]   1 [id number] 03/21/2025 03/19/2025 03/19/2025 ALPRAZolam (Tablet) 45.0 22 0.5 MG NA [facility]REG[facility] [employer] 0 /  0 [place]   1 [id number] 02/25/2025 02/24/2025 02/24/2025 ALPRAZolam (Tablet) 45.0 22 0.5 MG NA MARIANNA) [facility] [employer] 0 / 0 [place]   1 [id number] 02/04/2025 02/04/2025 02/03/2025 ALPRAZolam (Tablet) 45.0 22 0.5 MG NA MARIANNA) [facility] [employer] 0 / 0 [place]

## 2025-07-18 ENCOUNTER — TELEPHONE (OUTPATIENT)
Age: 65
End: 2025-07-18

## 2025-07-18 VITALS — BODY MASS INDEX: 28.11 KG/M2 | WEIGHT: 219 LBS | HEIGHT: 74 IN

## 2025-07-18 DIAGNOSIS — Z96.642 STATUS POST TOTAL REPLACEMENT OF LEFT HIP: Primary | ICD-10-CM

## 2025-07-18 DIAGNOSIS — R11.0 NAUSEA: ICD-10-CM

## 2025-07-18 PROCEDURE — 99024 POSTOP FOLLOW-UP VISIT: CPT | Performed by: STUDENT IN AN ORGANIZED HEALTH CARE EDUCATION/TRAINING PROGRAM

## 2025-07-18 RX ORDER — ALPRAZOLAM 0.5 MG
0.5 TABLET ORAL 2 TIMES DAILY PRN
Qty: 45 TABLET | Refills: 0 | Status: SHIPPED | OUTPATIENT
Start: 2025-07-18

## 2025-07-18 RX ORDER — ONDANSETRON 4 MG/1
4 TABLET, FILM COATED ORAL EVERY 8 HOURS PRN
Qty: 20 TABLET | Refills: 0 | Status: SHIPPED | OUTPATIENT
Start: 2025-07-18

## 2025-07-18 NOTE — TELEPHONE ENCOUNTER
Pt called refill line to check the status of the refill and is asking if it can be done today and sent to [facility] - [place] - [address] [facility]

## 2025-07-18 NOTE — TELEPHONE ENCOUNTER
Reason for call:   [x] Refill   [] Prior Auth  [] Other:     Office:   [x] PCP/Provider - Rory Herring MD   [] Specialty/Provider -     Medication:   ondansetron (ZOFRAN) 4 mg tablet       Dose/Frequency: 4 mg, Oral, Every 8 hours PRN     Quantity: 20    Pharmacy: [facility]     [facility]   Does the patient have enough for 3 days?   [] Yes   [x] No - Send as HP to POD    [facility] Pharmacy   Does the patient have enough for 10 days?   [] Yes   [] No - Send as HP to POD

## 2025-07-18 NOTE — TELEPHONE ENCOUNTER
Patient states he has been out of his xanax for 4 days now. Asking for this to be sent before the weekend along with his Zofran. He is upset as to why this is taking long.

## 2025-07-18 NOTE — TELEPHONE ENCOUNTER
Caller: German    Doctor: Dr. Hendrix    Reason for call: Patient would like to try the muscle relaxer you wanted to offer him. He stated he is willing to try anything for his pain.    Pharmacy: [facility]- [facility]    Call back#: [phone]

## 2025-07-18 NOTE — TELEPHONE ENCOUNTER
Pt last seen 4/29/25, has appt 9/30/25.    Pharmacy requesting:  ondansetron (ZOFRAN) 4 mg tablet.

## 2025-07-18 NOTE — TELEPHONE ENCOUNTER
Patient called to request a refill for their alprazolam advised a refill was requested on 7/16/25 and is pending approval. Patient verbalized understanding and is in agreement.     Does the patient have enough for 3 days?   [] Yes   [x] No - Send as HP to POD   patient has been out of medication and was hoping this could be called in soon as the pharmacy is closed on Sundays.

## 2025-07-18 NOTE — PROGRESS NOTES
Date: 25  German Louie Jr.   MRN# [id number]  : 1960      Chief Complaint: Post op Left  primary Total Hip Arthroplasty      Assessment & Plan  Status post total replacement of left hip  Patient is 2 weeks s/p left total hip arthroplasty  - WBAT  - wean off assistive devices as tolerated   - Tylenol and Celebrex for pain control prn  - Begin formal physical therapy for purposes of restoration of ROM and gait training  - ASA 81mg BID for dvt ppx   - May shower, do not soak or submerge incision  - RTC in 4 weeks. left hip xrays needed            Subjective:   Patient is 2 weeks s/p left primary total hip arthroplasty. He claims that he is having trouble and pain walking. He is able to perform HEP with minimal pain.    Date of procedure: 25  Doing well, no new problems  Pain progressively improving  Improved swelling  Ambulating with walker    Allergy:  Allergies[1]  Medications:  all current active meds have been reviewed  Past Medical History:  Past Medical History[2]  Past Surgical History:  Past Surgical History[3]  Family History:  Family History[4]  Social History:  Social History     Substance and Sexual Activity   Alcohol Use Not Currently     Social History     Substance and Sexual Activity   Drug Use Never     Tobacco Use History[5]          Review of Systems:  General- denies fever/chills  HEENT- denies hearing loss or sore throat  Eyes- denies eye pain or visual disturbances, denies red eyes  Respiratory- denies cough or SOB  Cardio- denies chest pain or palpitations  GI- denies abdominal pain  Endocrine- denies urinary frequency  Urinary- denies pain with urination  Musculoskeletal- Negative except noted above  Skin- denies rashes or wounds  Neurological- denies dizziness or headache  Psychiatric- denies anxiety or difficulty concentrating    Objective:   BP Readings from Last 1 Encounters:   25 151/74      Wt Readings from Last 1 Encounters:   25 99.3 kg (219 lb)     "  Pulse Readings from Last 1 Encounters:   07/07/25 86        BMI: Estimated body mass index is 28.12 kg/m² as calculated from the following:    Height as of this encounter: 6' 2\" (1.88 m).    Weight as of this encounter: 99.3 kg (219 lb).    Physical Exam  Ht 6' 2\" (1.88 m)   Wt 99.3 kg (219 lb)   BMI 28.12 kg/m²   General/Constitutional: No apparent distress: well-nourished and well developed.  Eyes: normal ocular motion  Cardio: RRR, Normal S1S2, No m/r/g.   Lymphatic: No appreciable lymphadenopathy  Respiratory: Non-labored breathing, CTA b/l no w/c/r  Vascular: No edema, swelling or tenderness, except as noted in detailed exam. Extremities well perfused. No LE edema  Integumentary: No impressive skin lesions present, except as noted in detailed exam.  Neuro: No ataxia or tremors noted  Psych: Normal mood and affect, oriented to person, place and time. Appropriate affect.  Musculoskeletal: Normal, except as noted in detailed exam and in HPI.    Gait and Station:   antalgic    left Hip Examination  Incision: clean, dry, and intact  Erythema: Absent  Ecchymosis: Absent  Wound edges: well approximated incision  Wound drainage: None: wound tissue dry  Motor: 4/5 EHL/FHL/TA/GS/QD/HS  Neurovascular exam is intact.   No evidence of calf tightness or posterior cords    Images:  No new imaging      Scribe Attestation      I,:  Nic Aguirre am acting as a scribe while in the presence of the attending physician.:       I,:  Aric Hendrix MD personally performed the services described in this documentation    as scribed in my presence.:               Aric Hendrix MD  Adult Reconstruction Specialist   [facility]          [1]   Allergies  Allergen Reactions    Grass Pollen(K-[id number]-T-Swt Jairo) Other (See Comments)     Sneezing, congestion    Zolpidem Other (See Comments)     severe drowsiness   [2]   Past Medical History:  Diagnosis Date    Arthrodesis status 04/19/2024    Diabetes mellitus " (HCC)     HLD (hyperlipidemia)     Hypertension     PONV (postoperative nausea and vomiting)     Stroke (HCC)      from duong, told him he had 2 minor strokes in      Suspected cerebrovascular accident (CVA) 2023   [3]   Past Surgical History:  Procedure Laterality Date    ANKLE SURGERY Right     BACK SURGERY      L4-L5    COLONOSCOPY      NECK SURGERY      2023    ME ARTHRP ACETBLR/PROX FEM PROSTC AGRFT/ALGRFT Left 2025    Procedure: ARTHROPLASTY LEFT HIP TOTAL ANTERIOR; SAME DAY;  Surgeon: Aric Hendrix MD;  Location: [facility];  Service: Orthopedics   [4]   Family History  Problem Relation Name Age of Onset    Diabetes Mother      Diabetes Father      Heart disease Maternal Grandfather      Heart disease Paternal Grandfather     [5]   Social History  Tobacco Use   Smoking Status Some Days    Current packs/day: 0.00    Average packs/day: 1 pack/day for 25.0 years (25.0 ttl pk-yrs)    Types: Cigarettes    Start date:     Last attempt to quit:     Years since quittin.5   Smokeless Tobacco Never   Tobacco Comments    Quit 6 days ago

## 2025-07-19 DIAGNOSIS — N40.0 BENIGN PROSTATIC HYPERPLASIA, UNSPECIFIED WHETHER LOWER URINARY TRACT SYMPTOMS PRESENT: ICD-10-CM

## 2025-07-19 DIAGNOSIS — E11.9 TYPE 2 DIABETES MELLITUS WITHOUT COMPLICATION, WITH LONG-TERM CURRENT USE OF INSULIN (HCC): ICD-10-CM

## 2025-07-19 DIAGNOSIS — Z96.642 STATUS POST TOTAL REPLACEMENT OF LEFT HIP: Primary | ICD-10-CM

## 2025-07-19 DIAGNOSIS — I63.89 CEREBROVASCULAR ACCIDENT (CVA) DUE TO OTHER MECHANISM (HCC): ICD-10-CM

## 2025-07-19 DIAGNOSIS — Z79.4 TYPE 2 DIABETES MELLITUS WITHOUT COMPLICATION, WITH LONG-TERM CURRENT USE OF INSULIN (HCC): ICD-10-CM

## 2025-07-19 RX ORDER — BACLOFEN 10 MG/1
10 TABLET ORAL 2 TIMES DAILY
Qty: 14 TABLET | Refills: 0 | Status: SHIPPED | OUTPATIENT
Start: 2025-07-19 | End: 2025-07-26

## 2025-07-20 RX ORDER — ONDANSETRON 4 MG/1
TABLET, FILM COATED ORAL
Qty: 20 TABLET | Refills: 0 | OUTPATIENT
Start: 2025-07-20

## 2025-07-21 RX ORDER — AMLODIPINE BESYLATE 5 MG/1
5 TABLET ORAL DAILY
Qty: 90 TABLET | Refills: 0 | Status: SHIPPED | OUTPATIENT
Start: 2025-07-21

## 2025-07-21 RX ORDER — INSULIN GLARGINE 300 U/ML
32 INJECTION, SOLUTION SUBCUTANEOUS
Qty: 6 ML | Refills: 0 | Status: SHIPPED | OUTPATIENT
Start: 2025-07-21

## 2025-07-21 RX ORDER — TAMSULOSIN HYDROCHLORIDE 0.4 MG/1
0.4 CAPSULE ORAL
Qty: 90 CAPSULE | Refills: 0 | Status: SHIPPED | OUTPATIENT
Start: 2025-07-21

## 2025-08-15 ENCOUNTER — APPOINTMENT (OUTPATIENT)
Dept: RADIOLOGY | Age: 65
End: 2025-08-15
Attending: STUDENT IN AN ORGANIZED HEALTH CARE EDUCATION/TRAINING PROGRAM
Payer: MEDICARE

## 2025-08-15 ENCOUNTER — OFFICE VISIT (OUTPATIENT)
Dept: OBGYN CLINIC | Facility: CLINIC | Age: 65
End: 2025-08-15

## (undated) DEVICE — GROUNDING PAD RFL

## (undated) DEVICE — HOOD WITH PEEL AWAY FACE SHIELD: Brand: T7PLUS

## (undated) DEVICE — NEPTUNE E-SEP SMOKE EVACUATION PENCIL, COATED, 70MM BLADE, PUSH BUTTON SWITCH: Brand: NEPTUNE E-SEP

## (undated) DEVICE — Device

## (undated) DEVICE — C-ARM: Brand: UNBRANDED

## (undated) DEVICE — SUT STRATAFIX SPIRAL MONOCRYL PLUS 4-0 PS-2 45CM SXMP1B118

## (undated) DEVICE — 450 ML BOTTLE OF 0.05% CHLORHEXIDINE GLUCONATE IN 99.95% STERILE WATER FOR IRRIGATION, USP AND APPLICATOR.: Brand: IRRISEPT ANTIMICROBIAL WOUND LAVAGE

## (undated) DEVICE — SUT STRATAFIX SPIRAL PDS PLUS 1 CTX 18 IN SXPP1A400

## (undated) DEVICE — BETHLEHEM TOTAL HIP, KIT: Brand: CARDINAL HEALTH

## (undated) DEVICE — TIBURON HIP DRAPE WITH POUCHES: Brand: CONVERTORS

## (undated) DEVICE — DECANTER: Brand: UNBRANDED

## (undated) DEVICE — SPECIMEN CONTAINER STERILE PEEL PACK

## (undated) DEVICE — HOOD: Brand: T7PLUS

## (undated) DEVICE — ANTIBACTERIAL UNDYED BRAIDED (POLYGLACTIN 910), SYNTHETIC ABSORBABLE SUTURE: Brand: COATED VICRYL

## (undated) DEVICE — BLADE ELECTRODE: Brand: EDGE

## (undated) DEVICE — STRL COTTON TIP APPLCTR 6IN PK: Brand: CARDINAL HEALTH

## (undated) DEVICE — CAPIT HIP PR Z1 ST/G7 CP/VE LN/CER HD

## (undated) DEVICE — 3M™ STERI-DRAPE™ U-DRAPE 1015: Brand: STERI-DRAPE™

## (undated) DEVICE — HANDPIECE SET WITH RETRACTABLE COAXIAL FAN SPRAY TIP AND SUCTION TUBE: Brand: INTERPULSE